# Patient Record
Sex: MALE | Race: WHITE | NOT HISPANIC OR LATINO | Employment: OTHER | URBAN - METROPOLITAN AREA
[De-identification: names, ages, dates, MRNs, and addresses within clinical notes are randomized per-mention and may not be internally consistent; named-entity substitution may affect disease eponyms.]

---

## 2021-06-14 ENCOUNTER — APPOINTMENT (OUTPATIENT)
Dept: RADIOLOGY | Facility: MEDICAL CENTER | Age: 56
DRG: 956 | End: 2021-06-14
Attending: EMERGENCY MEDICINE
Payer: COMMERCIAL

## 2021-06-14 ENCOUNTER — HOSPITAL ENCOUNTER (INPATIENT)
Facility: MEDICAL CENTER | Age: 56
LOS: 30 days | DRG: 956 | End: 2021-07-14
Attending: EMERGENCY MEDICINE | Admitting: SURGERY
Payer: COMMERCIAL

## 2021-06-14 ENCOUNTER — APPOINTMENT (OUTPATIENT)
Dept: RADIOLOGY | Facility: MEDICAL CENTER | Age: 56
DRG: 956 | End: 2021-06-14
Attending: SURGERY
Payer: COMMERCIAL

## 2021-06-14 DIAGNOSIS — I82.412 ACUTE DEEP VEIN THROMBOSIS (DVT) OF FEMORAL VEIN OF LEFT LOWER EXTREMITY (HCC): ICD-10-CM

## 2021-06-14 DIAGNOSIS — S52.551A OTHER CLOSED EXTRA-ARTICULAR FRACTURE OF DISTAL END OF RIGHT RADIUS, INITIAL ENCOUNTER: ICD-10-CM

## 2021-06-14 DIAGNOSIS — S52.501A CLOSED FRACTURE OF DISTAL END OF RIGHT RADIUS, UNSPECIFIED FRACTURE MORPHOLOGY, INITIAL ENCOUNTER: ICD-10-CM

## 2021-06-14 DIAGNOSIS — S32.810A MULTIPLE CLOSED FRACTURES OF PELVIS WITH STABLE DISRUPTION OF PELVIC RING, INITIAL ENCOUNTER (HCC): ICD-10-CM

## 2021-06-14 DIAGNOSIS — J96.90 RESPIRATORY FAILURE, UNSPECIFIED CHRONICITY, UNSPECIFIED WHETHER WITH HYPOXIA OR HYPERCAPNIA (HCC): ICD-10-CM

## 2021-06-14 DIAGNOSIS — S22.43XA CLOSED FRACTURE OF MULTIPLE RIBS OF BOTH SIDES, INITIAL ENCOUNTER: ICD-10-CM

## 2021-06-14 DIAGNOSIS — S27.0XXA TRAUMATIC PNEUMOTHORAX, INITIAL ENCOUNTER: ICD-10-CM

## 2021-06-14 DIAGNOSIS — S32.82XA MULTIPLE CLOSED FRACTURES OF PELVIS WITHOUT DISRUPTION OF PELVIC RING, INITIAL ENCOUNTER (HCC): ICD-10-CM

## 2021-06-14 DIAGNOSIS — J96.90 RESPIRATORY FAILURE FOLLOWING TRAUMA (HCC): ICD-10-CM

## 2021-06-14 DIAGNOSIS — S72.001A CLOSED FRACTURE OF NECK OF RIGHT FEMUR, INITIAL ENCOUNTER (HCC): ICD-10-CM

## 2021-06-14 DIAGNOSIS — S72.401A CLOSED FRACTURE OF DISTAL END OF RIGHT FEMUR, UNSPECIFIED FRACTURE MORPHOLOGY, INITIAL ENCOUNTER (HCC): ICD-10-CM

## 2021-06-14 DIAGNOSIS — S06.0X1A CONCUSSION WITH LOSS OF CONSCIOUSNESS OF 30 MINUTES OR LESS, INITIAL ENCOUNTER: ICD-10-CM

## 2021-06-14 DIAGNOSIS — S43.101A AC SEPARATION, RIGHT, INITIAL ENCOUNTER: ICD-10-CM

## 2021-06-14 DIAGNOSIS — S82.141A CLOSED FRACTURE OF RIGHT TIBIAL PLATEAU, INITIAL ENCOUNTER: ICD-10-CM

## 2021-06-14 DIAGNOSIS — S82.101A CLOSED FRACTURE OF PROXIMAL END OF RIGHT TIBIA, UNSPECIFIED FRACTURE MORPHOLOGY, INITIAL ENCOUNTER: ICD-10-CM

## 2021-06-14 PROBLEM — I70.208 OCCLUSION OF RIGHT BRACHIAL ARTERY (HCC): Status: ACTIVE | Noted: 2021-06-14

## 2021-06-14 PROBLEM — Z53.09 CONTRAINDICATION TO DEEP VEIN THROMBOSIS (DVT) PROPHYLAXIS: Status: ACTIVE | Noted: 2021-06-14

## 2021-06-14 PROBLEM — Z11.9 SCREENING EXAMINATION FOR INFECTIOUS DISEASE: Status: ACTIVE | Noted: 2021-06-14

## 2021-06-14 PROBLEM — S72.91XA CLOSED FRACTURE OF RIGHT FEMUR (HCC): Status: ACTIVE | Noted: 2021-06-14

## 2021-06-14 PROBLEM — S32.10XA SACRAL FRACTURE (HCC): Status: ACTIVE | Noted: 2021-06-14

## 2021-06-14 PROBLEM — S22.49XA CLOSED FRACTURE OF MULTIPLE RIBS: Status: ACTIVE | Noted: 2021-06-14

## 2021-06-14 PROBLEM — J93.9 PNEUMOTHORAX ON RIGHT: Status: ACTIVE | Noted: 2021-06-14

## 2021-06-14 PROBLEM — R93.5 ABNORMAL CT OF THE ABDOMEN: Status: ACTIVE | Noted: 2021-06-14

## 2021-06-14 PROBLEM — S01.111A: Status: ACTIVE | Noted: 2021-06-14

## 2021-06-14 PROBLEM — T14.90XA TRAUMA: Status: ACTIVE | Noted: 2021-06-14

## 2021-06-14 PROBLEM — S32.000A WEDGE FRACTURE OF LUMBAR VERTEBRA (HCC): Status: ACTIVE | Noted: 2021-06-14

## 2021-06-14 PROBLEM — I65.21 OCCLUSION OF RIGHT CAROTID ARTERY: Status: ACTIVE | Noted: 2021-06-14

## 2021-06-14 LAB
ABO GROUP BLD: NORMAL
ALBUMIN SERPL BCP-MCNC: 3.4 G/DL (ref 3.2–4.9)
ALBUMIN/GLOB SERPL: 0.9 G/DL
ALP SERPL-CCNC: 111 U/L (ref 30–99)
ALT SERPL-CCNC: 43 U/L (ref 2–50)
ANION GAP SERPL CALC-SCNC: 14 MMOL/L (ref 7–16)
APTT PPP: 116.1 SEC (ref 24.7–36)
AST SERPL-CCNC: 68 U/L (ref 12–45)
BASE EXCESS BLDA CALC-SCNC: -4 MMOL/L (ref -4–3)
BASE EXCESS BLDA CALC-SCNC: 0 MMOL/L (ref -4–3)
BILIRUB SERPL-MCNC: 1 MG/DL (ref 0.1–1.5)
BLD GP AB SCN SERPL QL: NORMAL
BODY TEMPERATURE: ABNORMAL CENTIGRADE
BODY TEMPERATURE: ABNORMAL DEGREES
BREATHS SETTING VENT: 18
BUN SERPL-MCNC: 16 MG/DL (ref 8–22)
CALCIUM SERPL-MCNC: 8.5 MG/DL (ref 8.5–10.5)
CFT BLD TEG: NORMAL MIN (ref 5–10)
CFT P HPASE BLD TEG: 3.9 MIN (ref 5–10)
CHLORIDE SERPL-SCNC: 101 MMOL/L (ref 96–112)
CLOT ANGLE BLD TEG: NORMAL DEGREES (ref 53–72)
CLOT ANGLE P HPASE BLD TEG: 71.4 DEGREES (ref 53–72)
CLOT INIT P HPASE BLD TEG: 1.3 MIN (ref 1–3)
CLOT LYSIS 30M P MA LENFR BLD TEG: 2.3 % (ref 0–8)
CLOT LYSIS 30M P MA LENFR BLD TEG: NORMAL % (ref 0–8)
CO2 BLDA-SCNC: 26 MMOL/L (ref 20–33)
CO2 SERPL-SCNC: 21 MMOL/L (ref 20–33)
CREAT SERPL-MCNC: 1.25 MG/DL (ref 0.5–1.4)
CT.EXTRINSIC BLD ROTEM: NORMAL MIN (ref 1–3)
DELSYS IDSYS: ABNORMAL
END TIDAL CARBON DIOXIDE IECO2: 33 MMHG
ERYTHROCYTE [DISTWIDTH] IN BLOOD BY AUTOMATED COUNT: 48.2 FL (ref 35.9–50)
ETHANOL BLD-MCNC: <10.1 MG/DL (ref 0–10)
GLOBULIN SER CALC-MCNC: 3.7 G/DL (ref 1.9–3.5)
GLUCOSE SERPL-MCNC: 120 MG/DL (ref 65–99)
HCO3 BLDA-SCNC: 21 MMOL/L (ref 17–25)
HCO3 BLDA-SCNC: 24.5 MMOL/L (ref 17–25)
HCT VFR BLD AUTO: 34.9 % (ref 42–52)
HGB BLD-MCNC: 11.2 G/DL (ref 14–18)
HOROWITZ INDEX BLDA+IHG-RTO: 184 MM[HG]
INR PPP: 1.46 (ref 0.87–1.13)
LACTATE BLD-SCNC: 3.9 MMOL/L (ref 0.5–2)
MCF BLD TEG: NORMAL MM (ref 50–70)
MCF P HPASE BLD TEG: 60.2 MM (ref 50–70)
MCH RBC QN AUTO: 29.4 PG (ref 27–33)
MCHC RBC AUTO-ENTMCNC: 32.1 G/DL (ref 33.7–35.3)
MCV RBC AUTO: 91.6 FL (ref 81.4–97.8)
MODE IMODE: ABNORMAL
O2/TOTAL GAS SETTING VFR VENT: 50 %
PA AA BLD-ACNC: 70.1 %
PA ADP BLD-ACNC: 100 %
PCO2 BLDA: 38 MMHG (ref 26–37)
PCO2 BLDA: 39.1 MMHG (ref 26–37)
PCO2 TEMP ADJ BLDA: 39.7 MMHG (ref 26–37)
PEEP END EXPIRATORY PRESSURE IPEEP: 8 CMH20
PH BLDA: 7.37 [PH] (ref 7.4–7.5)
PH BLDA: 7.41 [PH] (ref 7.4–7.5)
PH TEMP ADJ BLDA: 7.4 [PH] (ref 7.4–7.5)
PLATELET # BLD AUTO: 200 K/UL (ref 164–446)
PMV BLD AUTO: 11.2 FL (ref 9–12.9)
PO2 BLDA: 147.1 MMHG (ref 64–87)
PO2 BLDA: 92 MMHG (ref 64–87)
PO2 TEMP ADJ BLDA: 94 MMHG (ref 64–87)
POTASSIUM SERPL-SCNC: 3.3 MMOL/L (ref 3.6–5.5)
PROT SERPL-MCNC: 7.1 G/DL (ref 6–8.2)
PROTHROMBIN TIME: 17.3 SEC (ref 12–14.6)
RBC # BLD AUTO: 3.81 M/UL (ref 4.7–6.1)
RH BLD: NORMAL
SAO2 % BLDA: 97 % (ref 93–99)
SAO2 % BLDA: 98.6 % (ref 93–99)
SODIUM SERPL-SCNC: 136 MMOL/L (ref 135–145)
SPECIMEN DRAWN FROM PATIENT: ABNORMAL
TEG ALGORITHM TGALG: NORMAL
TIDAL VOLUME IVT: 450 ML
WBC # BLD AUTO: 25.8 K/UL (ref 4.8–10.8)

## 2021-06-14 PROCEDURE — 71045 X-RAY EXAM CHEST 1 VIEW: CPT

## 2021-06-14 PROCEDURE — 76705 ECHO EXAM OF ABDOMEN: CPT

## 2021-06-14 PROCEDURE — 02HV33Z INSERTION OF INFUSION DEVICE INTO SUPERIOR VENA CAVA, PERCUTANEOUS APPROACH: ICD-10-PCS | Performed by: SURGERY

## 2021-06-14 PROCEDURE — 94002 VENT MGMT INPAT INIT DAY: CPT

## 2021-06-14 PROCEDURE — 96376 TX/PRO/DX INJ SAME DRUG ADON: CPT

## 2021-06-14 PROCEDURE — 96374 THER/PROPH/DIAG INJ IV PUSH: CPT

## 2021-06-14 PROCEDURE — 72128 CT CHEST SPINE W/O DYE: CPT

## 2021-06-14 PROCEDURE — 70450 CT HEAD/BRAIN W/O DYE: CPT

## 2021-06-14 PROCEDURE — 70486 CT MAXILLOFACIAL W/O DYE: CPT

## 2021-06-14 PROCEDURE — 700117 HCHG RX CONTRAST REV CODE 255: Performed by: EMERGENCY MEDICINE

## 2021-06-14 PROCEDURE — 85610 PROTHROMBIN TIME: CPT

## 2021-06-14 PROCEDURE — U0003 INFECTIOUS AGENT DETECTION BY NUCLEIC ACID (DNA OR RNA); SEVERE ACUTE RESPIRATORY SYNDROME CORONAVIRUS 2 (SARS-COV-2) (CORONAVIRUS DISEASE [COVID-19]), AMPLIFIED PROBE TECHNIQUE, MAKING USE OF HIGH THROUGHPUT TECHNOLOGIES AS DESCRIBED BY CMS-2020-01-R: HCPCS

## 2021-06-14 PROCEDURE — 87426 SARSCOV CORONAVIRUS AG IA: CPT

## 2021-06-14 PROCEDURE — 31500 INSERT EMERGENCY AIRWAY: CPT

## 2021-06-14 PROCEDURE — 85576 BLOOD PLATELET AGGREGATION: CPT | Mod: 91

## 2021-06-14 PROCEDURE — 99291 CRITICAL CARE FIRST HOUR: CPT

## 2021-06-14 PROCEDURE — 73090 X-RAY EXAM OF FOREARM: CPT | Mod: RT

## 2021-06-14 PROCEDURE — 73700 CT LOWER EXTREMITY W/O DYE: CPT | Mod: LT

## 2021-06-14 PROCEDURE — 770022 HCHG ROOM/CARE - ICU (200)

## 2021-06-14 PROCEDURE — 85730 THROMBOPLASTIN TIME PARTIAL: CPT

## 2021-06-14 PROCEDURE — 303105 HCHG CATHETER EXTRA

## 2021-06-14 PROCEDURE — 90471 IMMUNIZATION ADMIN: CPT

## 2021-06-14 PROCEDURE — 85027 COMPLETE CBC AUTOMATED: CPT

## 2021-06-14 PROCEDURE — 51702 INSERT TEMP BLADDER CATH: CPT

## 2021-06-14 PROCEDURE — G0390 TRAUMA RESPONS W/HOSP CRITI: HCPCS

## 2021-06-14 PROCEDURE — 3E0234Z INTRODUCTION OF SERUM, TOXOID AND VACCINE INTO MUSCLE, PERCUTANEOUS APPROACH: ICD-10-PCS | Performed by: EMERGENCY MEDICINE

## 2021-06-14 PROCEDURE — 99254 IP/OBS CNSLTJ NEW/EST MOD 60: CPT | Mod: 57 | Performed by: ORTHOPAEDIC SURGERY

## 2021-06-14 PROCEDURE — C1751 CATH, INF, PER/CENT/MIDLINE: HCPCS

## 2021-06-14 PROCEDURE — 82077 ASSAY SPEC XCP UR&BREATH IA: CPT

## 2021-06-14 PROCEDURE — 25605 CLTX DST RDL FX/EPHYS SEP W/: CPT

## 2021-06-14 PROCEDURE — 302214 INTUBATION BOX: Performed by: SURGERY

## 2021-06-14 PROCEDURE — 86901 BLOOD TYPING SEROLOGIC RH(D): CPT

## 2021-06-14 PROCEDURE — 80053 COMPREHEN METABOLIC PANEL: CPT

## 2021-06-14 PROCEDURE — 700101 HCHG RX REV CODE 250: Performed by: EMERGENCY MEDICINE

## 2021-06-14 PROCEDURE — 306637 HCHG MISC ORTHO ITEM RC 0274

## 2021-06-14 PROCEDURE — 90715 TDAP VACCINE 7 YRS/> IM: CPT | Performed by: SURGERY

## 2021-06-14 PROCEDURE — 86900 BLOOD TYPING SEROLOGIC ABO: CPT

## 2021-06-14 PROCEDURE — 700105 HCHG RX REV CODE 258: Performed by: SURGERY

## 2021-06-14 PROCEDURE — 82803 BLOOD GASES ANY COMBINATION: CPT

## 2021-06-14 PROCEDURE — 83605 ASSAY OF LACTIC ACID: CPT

## 2021-06-14 PROCEDURE — 36556 INSERT NON-TUNNEL CV CATH: CPT

## 2021-06-14 PROCEDURE — 700111 HCHG RX REV CODE 636 W/ 250 OVERRIDE (IP): Performed by: EMERGENCY MEDICINE

## 2021-06-14 PROCEDURE — U0005 INFEC AGEN DETEC AMPLI PROBE: HCPCS

## 2021-06-14 PROCEDURE — 72131 CT LUMBAR SPINE W/O DYE: CPT

## 2021-06-14 PROCEDURE — 96375 TX/PRO/DX INJ NEW DRUG ADDON: CPT

## 2021-06-14 PROCEDURE — 85347 COAGULATION TIME ACTIVATED: CPT | Mod: 91

## 2021-06-14 PROCEDURE — 85384 FIBRINOGEN ACTIVITY: CPT

## 2021-06-14 PROCEDURE — 94799 UNLISTED PULMONARY SVC/PX: CPT

## 2021-06-14 PROCEDURE — 700111 HCHG RX REV CODE 636 W/ 250 OVERRIDE (IP): Performed by: SURGERY

## 2021-06-14 PROCEDURE — 73552 X-RAY EXAM OF FEMUR 2/>: CPT | Mod: RT

## 2021-06-14 PROCEDURE — 71260 CT THORAX DX C+: CPT

## 2021-06-14 PROCEDURE — 0BH18EZ INSERTION OF ENDOTRACHEAL AIRWAY INTO TRACHEA, VIA NATURAL OR ARTIFICIAL OPENING ENDOSCOPIC: ICD-10-PCS | Performed by: EMERGENCY MEDICINE

## 2021-06-14 PROCEDURE — 72170 X-RAY EXAM OF PELVIS: CPT

## 2021-06-14 PROCEDURE — 72125 CT NECK SPINE W/O DYE: CPT

## 2021-06-14 PROCEDURE — 86850 RBC ANTIBODY SCREEN: CPT

## 2021-06-14 PROCEDURE — 36600 WITHDRAWAL OF ARTERIAL BLOOD: CPT

## 2021-06-14 PROCEDURE — 27510 TREATMENT OF THIGH FRACTURE: CPT

## 2021-06-14 PROCEDURE — 5A1945Z RESPIRATORY VENTILATION, 24-96 CONSECUTIVE HOURS: ICD-10-PCS | Performed by: EMERGENCY MEDICINE

## 2021-06-14 RX ORDER — FAMOTIDINE 20 MG/1
20 TABLET, FILM COATED ORAL 2 TIMES DAILY
Status: DISCONTINUED | OUTPATIENT
Start: 2021-06-15 | End: 2021-06-19

## 2021-06-14 RX ORDER — BISACODYL 10 MG
10 SUPPOSITORY, RECTAL RECTAL
Status: DISCONTINUED | OUTPATIENT
Start: 2021-06-14 | End: 2021-07-14 | Stop reason: HOSPADM

## 2021-06-14 RX ORDER — AMOXICILLIN 250 MG
1 CAPSULE ORAL
Status: DISCONTINUED | OUTPATIENT
Start: 2021-06-14 | End: 2021-07-14 | Stop reason: HOSPADM

## 2021-06-14 RX ORDER — ENEMA 19; 7 G/133ML; G/133ML
1 ENEMA RECTAL
Status: DISCONTINUED | OUTPATIENT
Start: 2021-06-14 | End: 2021-07-14 | Stop reason: HOSPADM

## 2021-06-14 RX ORDER — KETAMINE HYDROCHLORIDE 50 MG/ML
INJECTION, SOLUTION INTRAMUSCULAR; INTRAVENOUS
Status: COMPLETED | OUTPATIENT
Start: 2021-06-14 | End: 2021-06-14

## 2021-06-14 RX ORDER — ROCURONIUM BROMIDE 10 MG/ML
INJECTION, SOLUTION INTRAVENOUS
Status: COMPLETED | OUTPATIENT
Start: 2021-06-14 | End: 2021-06-14

## 2021-06-14 RX ORDER — DOCUSATE SODIUM 100 MG/1
100 CAPSULE, LIQUID FILLED ORAL 2 TIMES DAILY
Status: DISCONTINUED | OUTPATIENT
Start: 2021-06-14 | End: 2021-07-13

## 2021-06-14 RX ORDER — ONDANSETRON 2 MG/ML
4 INJECTION INTRAMUSCULAR; INTRAVENOUS EVERY 4 HOURS PRN
Status: DISCONTINUED | OUTPATIENT
Start: 2021-06-14 | End: 2021-07-01

## 2021-06-14 RX ORDER — POLYETHYLENE GLYCOL 3350 17 G/17G
1 POWDER, FOR SOLUTION ORAL 2 TIMES DAILY
Status: DISCONTINUED | OUTPATIENT
Start: 2021-06-14 | End: 2021-07-14 | Stop reason: HOSPADM

## 2021-06-14 RX ORDER — SODIUM CHLORIDE, SODIUM LACTATE, POTASSIUM CHLORIDE, CALCIUM CHLORIDE 600; 310; 30; 20 MG/100ML; MG/100ML; MG/100ML; MG/100ML
INJECTION, SOLUTION INTRAVENOUS CONTINUOUS
Status: DISCONTINUED | OUTPATIENT
Start: 2021-06-14 | End: 2021-06-16

## 2021-06-14 RX ORDER — AMOXICILLIN 250 MG
1 CAPSULE ORAL NIGHTLY
Status: DISCONTINUED | OUTPATIENT
Start: 2021-06-14 | End: 2021-07-13

## 2021-06-14 RX ADMIN — SODIUM CHLORIDE, POTASSIUM CHLORIDE, SODIUM LACTATE AND CALCIUM CHLORIDE: 600; 310; 30; 20 INJECTION, SOLUTION INTRAVENOUS at 23:10

## 2021-06-14 RX ADMIN — CLOSTRIDIUM TETANI TOXOID ANTIGEN (FORMALDEHYDE INACTIVATED), CORYNEBACTERIUM DIPHTHERIAE TOXOID ANTIGEN (FORMALDEHYDE INACTIVATED), BORDETELLA PERTUSSIS TOXOID ANTIGEN (GLUTARALDEHYDE INACTIVATED), BORDETELLA PERTUSSIS FILAMENTOUS HEMAGGLUTININ ANTIGEN (FORMALDEHYDE INACTIVATED), BORDETELLA PERTUSSIS PERTACTIN ANTIGEN, AND BORDETELLA PERTUSSIS FIMBRIAE 2/3 ANTIGEN 0.5 ML: 5; 2; 2.5; 5; 3; 5 INJECTION, SUSPENSION INTRAMUSCULAR at 21:36

## 2021-06-14 RX ADMIN — KETAMINE HYDROCHLORIDE 100 MG: 50 INJECTION INTRAMUSCULAR; INTRAVENOUS at 21:09

## 2021-06-14 RX ADMIN — FENTANYL CITRATE 50 MCG: 50 INJECTION, SOLUTION INTRAMUSCULAR; INTRAVENOUS at 23:09

## 2021-06-14 RX ADMIN — FENTANYL CITRATE 50 MCG: 50 INJECTION, SOLUTION INTRAMUSCULAR; INTRAVENOUS at 21:10

## 2021-06-14 RX ADMIN — PROPOFOL 30 MCG/KG/MIN: 10 INJECTION, EMULSION INTRAVENOUS at 21:30

## 2021-06-14 RX ADMIN — ROCURONIUM BROMIDE 100 MG: 10 INJECTION, SOLUTION INTRAVENOUS at 21:09

## 2021-06-14 RX ADMIN — FENTANYL CITRATE 50 MCG: 50 INJECTION, SOLUTION INTRAMUSCULAR; INTRAVENOUS at 21:29

## 2021-06-14 RX ADMIN — PROPOFOL 30 MCG/KG/MIN: 10 INJECTION, EMULSION INTRAVENOUS at 23:10

## 2021-06-14 RX ADMIN — IOHEXOL 100 ML: 350 INJECTION, SOLUTION INTRAVENOUS at 21:49

## 2021-06-14 ASSESSMENT — FIBROSIS 4 INDEX: FIB4 SCORE: 2.8

## 2021-06-14 ASSESSMENT — PAIN DESCRIPTION - PAIN TYPE: TYPE: ACUTE PAIN

## 2021-06-15 ENCOUNTER — APPOINTMENT (OUTPATIENT)
Dept: RADIOLOGY | Facility: MEDICAL CENTER | Age: 56
DRG: 956 | End: 2021-06-15
Attending: ORTHOPAEDIC SURGERY
Payer: COMMERCIAL

## 2021-06-15 ENCOUNTER — ANESTHESIA (OUTPATIENT)
Dept: SURGERY | Facility: MEDICAL CENTER | Age: 56
DRG: 956 | End: 2021-06-15
Payer: COMMERCIAL

## 2021-06-15 ENCOUNTER — ANESTHESIA EVENT (OUTPATIENT)
Dept: SURGERY | Facility: MEDICAL CENTER | Age: 56
DRG: 956 | End: 2021-06-15
Payer: COMMERCIAL

## 2021-06-15 ENCOUNTER — APPOINTMENT (OUTPATIENT)
Dept: RADIOLOGY | Facility: MEDICAL CENTER | Age: 56
DRG: 956 | End: 2021-06-15
Attending: SURGERY
Payer: COMMERCIAL

## 2021-06-15 PROBLEM — Z98.890 STATUS POST CARDIAC SURGERY: Status: ACTIVE | Noted: 2021-06-15

## 2021-06-15 PROBLEM — I95.9 HYPOTENSION: Status: ACTIVE | Noted: 2021-06-15

## 2021-06-15 LAB
ABO + RH BLD: NORMAL
ALBUMIN SERPL BCP-MCNC: 2.9 G/DL (ref 3.2–4.9)
ALBUMIN/GLOB SERPL: 0.9 G/DL
ALP SERPL-CCNC: 91 U/L (ref 30–99)
ALT SERPL-CCNC: 40 U/L (ref 2–50)
ANION GAP SERPL CALC-SCNC: 8 MMOL/L (ref 7–16)
ANISOCYTOSIS BLD QL SMEAR: ABNORMAL
AST SERPL-CCNC: 72 U/L (ref 12–45)
BASOPHILS # BLD AUTO: 0 % (ref 0–1.8)
BASOPHILS # BLD: 0 K/UL (ref 0–0.12)
BILIRUB SERPL-MCNC: 0.8 MG/DL (ref 0.1–1.5)
BUN SERPL-MCNC: 17 MG/DL (ref 8–22)
CALCIUM SERPL-MCNC: 8.3 MG/DL (ref 8.5–10.5)
CHLORIDE SERPL-SCNC: 101 MMOL/L (ref 96–112)
CO2 SERPL-SCNC: 24 MMOL/L (ref 20–33)
CORTIS SERPL-MCNC: 36.8 UG/DL (ref 0–23)
CREAT SERPL-MCNC: 0.97 MG/DL (ref 0.5–1.4)
EOSINOPHIL # BLD AUTO: 0 K/UL (ref 0–0.51)
EOSINOPHIL NFR BLD: 0 % (ref 0–6.9)
ERYTHROCYTE [DISTWIDTH] IN BLOOD BY AUTOMATED COUNT: 48.2 FL (ref 35.9–50)
GLOBULIN SER CALC-MCNC: 3.3 G/DL (ref 1.9–3.5)
GLUCOSE BLD-MCNC: 113 MG/DL (ref 65–99)
GLUCOSE SERPL-MCNC: 118 MG/DL (ref 65–99)
HCT VFR BLD AUTO: 29.5 % (ref 42–52)
HGB BLD-MCNC: 9.8 G/DL (ref 14–18)
LYMPHOCYTES # BLD AUTO: 0.88 K/UL (ref 1–4.8)
LYMPHOCYTES NFR BLD: 5.7 % (ref 22–41)
MACROCYTES BLD QL SMEAR: ABNORMAL
MANUAL DIFF BLD: ABNORMAL
MCH RBC QN AUTO: 30.4 PG (ref 27–33)
MCHC RBC AUTO-ENTMCNC: 33.2 G/DL (ref 33.7–35.3)
MCV RBC AUTO: 91.6 FL (ref 81.4–97.8)
MONOCYTES # BLD AUTO: 0.37 K/UL (ref 0–0.85)
MONOCYTES NFR BLD AUTO: 2.4 % (ref 0–13.4)
MORPHOLOGY BLD-IMP: NORMAL
NEUTROPHILS # BLD AUTO: 14.15 K/UL (ref 1.82–7.42)
NEUTROPHILS NFR BLD: 79.7 % (ref 44–72)
NEUTS BAND NFR BLD MANUAL: 12.2 % (ref 0–10)
NRBC # BLD AUTO: 0 K/UL
NRBC BLD-RTO: 0 /100 WBC
PLATELET # BLD AUTO: 143 K/UL (ref 164–446)
PLATELET BLD QL SMEAR: NORMAL
PMV BLD AUTO: 10.6 FL (ref 9–12.9)
POTASSIUM SERPL-SCNC: 3.9 MMOL/L (ref 3.6–5.5)
PROT SERPL-MCNC: 6.2 G/DL (ref 6–8.2)
RBC # BLD AUTO: 3.22 M/UL (ref 4.7–6.1)
RBC BLD AUTO: PRESENT
SARS-COV+SARS-COV-2 AG RESP QL IA.RAPID: NOTDETECTED
SARS-COV-2 RNA RESP QL NAA+PROBE: NOTDETECTED
SODIUM SERPL-SCNC: 133 MMOL/L (ref 135–145)
SPECIMEN SOURCE: NORMAL
SPECIMEN SOURCE: NORMAL
WBC # BLD AUTO: 15.4 K/UL (ref 4.8–10.8)

## 2021-06-15 PROCEDURE — 500054 HCHG BANDAGE, ELASTIC 6: Performed by: ORTHOPAEDIC SURGERY

## 2021-06-15 PROCEDURE — 700105 HCHG RX REV CODE 258: Performed by: SURGERY

## 2021-06-15 PROCEDURE — 71045 X-RAY EXAM CHEST 1 VIEW: CPT

## 2021-06-15 PROCEDURE — 700111 HCHG RX REV CODE 636 W/ 250 OVERRIDE (IP): Performed by: SURGERY

## 2021-06-15 PROCEDURE — 700105 HCHG RX REV CODE 258: Performed by: ANESTHESIOLOGY

## 2021-06-15 PROCEDURE — 700102 HCHG RX REV CODE 250 W/ 637 OVERRIDE(OP): Performed by: SURGERY

## 2021-06-15 PROCEDURE — 700111 HCHG RX REV CODE 636 W/ 250 OVERRIDE (IP): Performed by: ANESTHESIOLOGY

## 2021-06-15 PROCEDURE — L0458 TLSO 2MOD SYMPHIS-XIPHO PRE: HCPCS

## 2021-06-15 PROCEDURE — 99291 CRITICAL CARE FIRST HOUR: CPT | Performed by: SURGERY

## 2021-06-15 PROCEDURE — 160009 HCHG ANES TIME/MIN: Performed by: ORTHOPAEDIC SURGERY

## 2021-06-15 PROCEDURE — 700101 HCHG RX REV CODE 250: Performed by: ANESTHESIOLOGY

## 2021-06-15 PROCEDURE — 94003 VENT MGMT INPAT SUBQ DAY: CPT

## 2021-06-15 PROCEDURE — 501838 HCHG SUTURE GENERAL: Performed by: ORTHOPAEDIC SURGERY

## 2021-06-15 PROCEDURE — 160029 HCHG SURGERY MINUTES - 1ST 30 MINS LEVEL 4: Performed by: ORTHOPAEDIC SURGERY

## 2021-06-15 PROCEDURE — 160002 HCHG RECOVERY MINUTES (STAT): Performed by: ORTHOPAEDIC SURGERY

## 2021-06-15 PROCEDURE — 27215 TREAT PELVIC FRACTURE(S): CPT | Mod: 80ROC,59 | Performed by: ORTHOPAEDIC SURGERY

## 2021-06-15 PROCEDURE — 73552 X-RAY EXAM OF FEMUR 2/>: CPT | Mod: RT

## 2021-06-15 PROCEDURE — A6454 SELF-ADHER BAND W>=3" <5"/YD: HCPCS | Performed by: ORTHOPAEDIC SURGERY

## 2021-06-15 PROCEDURE — 27506 TREATMENT OF THIGH FRACTURE: CPT | Mod: RT | Performed by: ORTHOPAEDIC SURGERY

## 2021-06-15 PROCEDURE — 27216 TREAT PELVIC RING FRACTURE: CPT | Mod: 59,LT | Performed by: ORTHOPAEDIC SURGERY

## 2021-06-15 PROCEDURE — C1713 ANCHOR/SCREW BN/BN,TIS/BN: HCPCS | Performed by: ORTHOPAEDIC SURGERY

## 2021-06-15 PROCEDURE — A9270 NON-COVERED ITEM OR SERVICE: HCPCS | Performed by: SURGERY

## 2021-06-15 PROCEDURE — 500891 HCHG PACK, ORTHO MAJOR: Performed by: ORTHOPAEDIC SURGERY

## 2021-06-15 PROCEDURE — 0QSB36Z REPOSITION RIGHT LOWER FEMUR WITH INTRAMEDULLARY INTERNAL FIXATION DEVICE, PERCUTANEOUS APPROACH: ICD-10-PCS | Performed by: ORTHOPAEDIC SURGERY

## 2021-06-15 PROCEDURE — 700101 HCHG RX REV CODE 250

## 2021-06-15 PROCEDURE — 27215 TREAT PELVIC FRACTURE(S): CPT | Mod: 59 | Performed by: ORTHOPAEDIC SURGERY

## 2021-06-15 PROCEDURE — 110371 HCHG SHELL REV 272: Performed by: ORTHOPAEDIC SURGERY

## 2021-06-15 PROCEDURE — 27216 TREAT PELVIC RING FRACTURE: CPT | Mod: 80ROC,59 | Performed by: ORTHOPAEDIC SURGERY

## 2021-06-15 PROCEDURE — 82533 TOTAL CORTISOL: CPT

## 2021-06-15 PROCEDURE — 82962 GLUCOSE BLOOD TEST: CPT

## 2021-06-15 PROCEDURE — 27506 TREATMENT OF THIGH FRACTURE: CPT | Mod: 80ROC,RT | Performed by: ORTHOPAEDIC SURGERY

## 2021-06-15 PROCEDURE — L0150 CERV SEMI-RIG ADJ MOLDED CHN: HCPCS

## 2021-06-15 PROCEDURE — 0QS234Z REPOSITION RIGHT PELVIC BONE WITH INTERNAL FIXATION DEVICE, PERCUTANEOUS APPROACH: ICD-10-PCS | Performed by: ORTHOPAEDIC SURGERY

## 2021-06-15 PROCEDURE — 700101 HCHG RX REV CODE 250: Performed by: ORTHOPAEDIC SURGERY

## 2021-06-15 PROCEDURE — 72202 X-RAY EXAM SI JOINTS 3/> VWS: CPT

## 2021-06-15 PROCEDURE — 160036 HCHG PACU - EA ADDL 30 MINS PHASE I: Performed by: ORTHOPAEDIC SURGERY

## 2021-06-15 PROCEDURE — 85007 BL SMEAR W/DIFF WBC COUNT: CPT

## 2021-06-15 PROCEDURE — 700105 HCHG RX REV CODE 258

## 2021-06-15 PROCEDURE — 94799 UNLISTED PULMONARY SVC/PX: CPT

## 2021-06-15 PROCEDURE — 85027 COMPLETE CBC AUTOMATED: CPT

## 2021-06-15 PROCEDURE — 0QS134Z REPOSITION SACRUM WITH INTERNAL FIXATION DEVICE, PERCUTANEOUS APPROACH: ICD-10-PCS | Performed by: ORTHOPAEDIC SURGERY

## 2021-06-15 PROCEDURE — 160041 HCHG SURGERY MINUTES - EA ADDL 1 MIN LEVEL 4: Performed by: ORTHOPAEDIC SURGERY

## 2021-06-15 PROCEDURE — 160035 HCHG PACU - 1ST 60 MINS PHASE I: Performed by: ORTHOPAEDIC SURGERY

## 2021-06-15 PROCEDURE — 80053 COMPREHEN METABOLIC PANEL: CPT

## 2021-06-15 PROCEDURE — 160048 HCHG OR STATISTICAL LEVEL 1-5: Performed by: ORTHOPAEDIC SURGERY

## 2021-06-15 PROCEDURE — 500367 HCHG DRAIN KIT, HEMOVAC: Performed by: ORTHOPAEDIC SURGERY

## 2021-06-15 PROCEDURE — 770022 HCHG ROOM/CARE - ICU (200)

## 2021-06-15 DEVICE — SCREW CROSS LOCK 5MM X 37.5MM (4TX5=20): Type: IMPLANTABLE DEVICE | Site: FEMUR | Status: FUNCTIONAL

## 2021-06-15 DEVICE — SCREW CROSS LOCK 5MM X 45MM (4TX5=20): Type: IMPLANTABLE DEVICE | Site: FEMUR | Status: FUNCTIONAL

## 2021-06-15 DEVICE — IMPLANTABLE DEVICE: Type: IMPLANTABLE DEVICE | Site: FEMUR | Status: FUNCTIONAL

## 2021-06-15 DEVICE — WASHER FOR 7.3MM CANNULATED SCREWS 13.0MM  (3TX18=54) (6EA/PK): Type: IMPLANTABLE DEVICE | Site: FEMUR | Status: FUNCTIONAL

## 2021-06-15 DEVICE — SCREW CROSS LOCK 5MM X 55MM (4TX5=20): Type: IMPLANTABLE DEVICE | Site: FEMUR | Status: FUNCTIONAL

## 2021-06-15 DEVICE — SCREW CANNULATED FULLY THREADED 7.3MM X 85MM (3TX3=9): Type: IMPLANTABLE DEVICE | Site: FEMUR | Status: FUNCTIONAL

## 2021-06-15 DEVICE — SCREW CROSS LOCK 5MM X 80MM (4TX5=20): Type: IMPLANTABLE DEVICE | Site: FEMUR | Status: FUNCTIONAL

## 2021-06-15 RX ORDER — ONDANSETRON 2 MG/ML
INJECTION INTRAMUSCULAR; INTRAVENOUS PRN
Status: DISCONTINUED | OUTPATIENT
Start: 2021-06-15 | End: 2021-06-15 | Stop reason: SURG

## 2021-06-15 RX ORDER — OXYCODONE HCL 5 MG/5 ML
5 SOLUTION, ORAL ORAL
Status: COMPLETED | OUTPATIENT
Start: 2021-06-15 | End: 2021-06-15

## 2021-06-15 RX ORDER — ROCURONIUM BROMIDE 10 MG/ML
INJECTION, SOLUTION INTRAVENOUS PRN
Status: DISCONTINUED | OUTPATIENT
Start: 2021-06-15 | End: 2021-06-15 | Stop reason: SURG

## 2021-06-15 RX ORDER — LORAZEPAM 2 MG/ML
0.5 INJECTION INTRAMUSCULAR
Status: DISCONTINUED | OUTPATIENT
Start: 2021-06-15 | End: 2021-06-15 | Stop reason: HOSPADM

## 2021-06-15 RX ORDER — HYDROMORPHONE HYDROCHLORIDE 1 MG/ML
0.1 INJECTION, SOLUTION INTRAMUSCULAR; INTRAVENOUS; SUBCUTANEOUS
Status: DISCONTINUED | OUTPATIENT
Start: 2021-06-15 | End: 2021-06-15 | Stop reason: HOSPADM

## 2021-06-15 RX ORDER — HYDROMORPHONE HYDROCHLORIDE 1 MG/ML
0.4 INJECTION, SOLUTION INTRAMUSCULAR; INTRAVENOUS; SUBCUTANEOUS
Status: DISCONTINUED | OUTPATIENT
Start: 2021-06-15 | End: 2021-06-15 | Stop reason: HOSPADM

## 2021-06-15 RX ORDER — DIPHENHYDRAMINE HYDROCHLORIDE 50 MG/ML
12.5 INJECTION INTRAMUSCULAR; INTRAVENOUS
Status: DISCONTINUED | OUTPATIENT
Start: 2021-06-15 | End: 2021-06-15 | Stop reason: HOSPADM

## 2021-06-15 RX ORDER — DEXAMETHASONE SODIUM PHOSPHATE 4 MG/ML
INJECTION, SOLUTION INTRA-ARTICULAR; INTRALESIONAL; INTRAMUSCULAR; INTRAVENOUS; SOFT TISSUE PRN
Status: DISCONTINUED | OUTPATIENT
Start: 2021-06-15 | End: 2021-06-15 | Stop reason: SURG

## 2021-06-15 RX ORDER — NOREPINEPHRINE BITARTRATE 0.03 MG/ML
0-30 INJECTION, SOLUTION INTRAVENOUS CONTINUOUS
Status: DISCONTINUED | OUTPATIENT
Start: 2021-06-15 | End: 2021-06-17

## 2021-06-15 RX ORDER — MAGNESIUM HYDROXIDE 1200 MG/15ML
LIQUID ORAL
Status: COMPLETED | OUTPATIENT
Start: 2021-06-15 | End: 2021-06-15

## 2021-06-15 RX ORDER — LABETALOL HYDROCHLORIDE 5 MG/ML
5 INJECTION, SOLUTION INTRAVENOUS
Status: DISCONTINUED | OUTPATIENT
Start: 2021-06-15 | End: 2021-06-15 | Stop reason: HOSPADM

## 2021-06-15 RX ORDER — ONDANSETRON 2 MG/ML
4 INJECTION INTRAMUSCULAR; INTRAVENOUS
Status: DISCONTINUED | OUTPATIENT
Start: 2021-06-15 | End: 2021-06-15 | Stop reason: HOSPADM

## 2021-06-15 RX ORDER — HALOPERIDOL 5 MG/ML
1 INJECTION INTRAMUSCULAR
Status: DISCONTINUED | OUTPATIENT
Start: 2021-06-15 | End: 2021-06-15 | Stop reason: HOSPADM

## 2021-06-15 RX ORDER — HYDROMORPHONE HYDROCHLORIDE 1 MG/ML
0.2 INJECTION, SOLUTION INTRAMUSCULAR; INTRAVENOUS; SUBCUTANEOUS
Status: DISCONTINUED | OUTPATIENT
Start: 2021-06-15 | End: 2021-06-15 | Stop reason: HOSPADM

## 2021-06-15 RX ORDER — CEFAZOLIN SODIUM 1 G/3ML
INJECTION, POWDER, FOR SOLUTION INTRAMUSCULAR; INTRAVENOUS PRN
Status: DISCONTINUED | OUTPATIENT
Start: 2021-06-15 | End: 2021-06-15 | Stop reason: SURG

## 2021-06-15 RX ORDER — OXYCODONE HCL 5 MG/5 ML
10 SOLUTION, ORAL ORAL
Status: COMPLETED | OUTPATIENT
Start: 2021-06-15 | End: 2021-06-15

## 2021-06-15 RX ORDER — KETOROLAC TROMETHAMINE 30 MG/ML
INJECTION, SOLUTION INTRAMUSCULAR; INTRAVENOUS PRN
Status: DISCONTINUED | OUTPATIENT
Start: 2021-06-15 | End: 2021-06-15 | Stop reason: SURG

## 2021-06-15 RX ORDER — DEXMEDETOMIDINE HYDROCHLORIDE 100 UG/ML
INJECTION, SOLUTION INTRAVENOUS PRN
Status: DISCONTINUED | OUTPATIENT
Start: 2021-06-15 | End: 2021-06-15 | Stop reason: SURG

## 2021-06-15 RX ORDER — NOREPINEPHRINE BITARTRATE 0.03 MG/ML
INJECTION, SOLUTION INTRAVENOUS
Status: COMPLETED
Start: 2021-06-15 | End: 2021-06-15

## 2021-06-15 RX ORDER — TRANEXAMIC ACID 100 MG/ML
INJECTION, SOLUTION INTRAVENOUS PRN
Status: DISCONTINUED | OUTPATIENT
Start: 2021-06-15 | End: 2021-06-15 | Stop reason: SURG

## 2021-06-15 RX ORDER — OXYCODONE HYDROCHLORIDE 10 MG/1
10 TABLET ORAL
Status: DISCONTINUED | OUTPATIENT
Start: 2021-06-15 | End: 2021-06-16

## 2021-06-15 RX ORDER — OXYCODONE HYDROCHLORIDE 5 MG/1
5 TABLET ORAL
Status: DISCONTINUED | OUTPATIENT
Start: 2021-06-15 | End: 2021-06-16

## 2021-06-15 RX ORDER — SODIUM CHLORIDE, SODIUM LACTATE, POTASSIUM CHLORIDE, CALCIUM CHLORIDE 600; 310; 30; 20 MG/100ML; MG/100ML; MG/100ML; MG/100ML
INJECTION, SOLUTION INTRAVENOUS
Status: DISCONTINUED | OUTPATIENT
Start: 2021-06-15 | End: 2021-06-15 | Stop reason: SURG

## 2021-06-15 RX ORDER — HYDRALAZINE HYDROCHLORIDE 20 MG/ML
5 INJECTION INTRAMUSCULAR; INTRAVENOUS
Status: DISCONTINUED | OUTPATIENT
Start: 2021-06-15 | End: 2021-06-15 | Stop reason: HOSPADM

## 2021-06-15 RX ADMIN — SODIUM CHLORIDE, POTASSIUM CHLORIDE, SODIUM LACTATE AND CALCIUM CHLORIDE: 600; 310; 30; 20 INJECTION, SOLUTION INTRAVENOUS at 19:34

## 2021-06-15 RX ADMIN — SUGAMMADEX 200 MG: 100 INJECTION, SOLUTION INTRAVENOUS at 10:55

## 2021-06-15 RX ADMIN — SODIUM CHLORIDE, POTASSIUM CHLORIDE, SODIUM LACTATE AND CALCIUM CHLORIDE: 600; 310; 30; 20 INJECTION, SOLUTION INTRAVENOUS at 08:49

## 2021-06-15 RX ADMIN — TRANEXAMIC ACID 1000 MG: 100 INJECTION, SOLUTION INTRAVENOUS at 09:00

## 2021-06-15 RX ADMIN — DEXMEDETOMIDINE 10 MCG: 200 INJECTION, SOLUTION INTRAVENOUS at 09:25

## 2021-06-15 RX ADMIN — NOREPINEPHRINE BITARTRATE 20 MCG/MIN: 0.03 INJECTION, SOLUTION INTRAVENOUS at 00:20

## 2021-06-15 RX ADMIN — KETOROLAC TROMETHAMINE 30 MG: 30 INJECTION, SOLUTION INTRAMUSCULAR at 10:55

## 2021-06-15 RX ADMIN — FENTANYL CITRATE 100 MCG: 50 INJECTION, SOLUTION INTRAMUSCULAR; INTRAVENOUS at 03:05

## 2021-06-15 RX ADMIN — FENTANYL CITRATE 50 MCG: 50 INJECTION, SOLUTION INTRAMUSCULAR; INTRAVENOUS at 22:00

## 2021-06-15 RX ADMIN — CEFAZOLIN 2 G: 330 INJECTION, POWDER, FOR SOLUTION INTRAMUSCULAR; INTRAVENOUS at 09:00

## 2021-06-15 RX ADMIN — DEXMEDETOMIDINE 10 MCG: 200 INJECTION, SOLUTION INTRAVENOUS at 09:57

## 2021-06-15 RX ADMIN — FENTANYL CITRATE 150 MCG: 50 INJECTION, SOLUTION INTRAMUSCULAR; INTRAVENOUS at 09:00

## 2021-06-15 RX ADMIN — DEXAMETHASONE SODIUM PHOSPHATE 8 MG: 4 INJECTION, SOLUTION INTRA-ARTICULAR; INTRALESIONAL; INTRAMUSCULAR; INTRAVENOUS; SOFT TISSUE at 09:00

## 2021-06-15 RX ADMIN — FENTANYL CITRATE 50 MCG: 50 INJECTION, SOLUTION INTRAMUSCULAR; INTRAVENOUS at 01:57

## 2021-06-15 RX ADMIN — NOREPINEPHRINE BITARTRATE 20 MCG/MIN: 1 INJECTION, SOLUTION, CONCENTRATE INTRAVENOUS at 00:20

## 2021-06-15 RX ADMIN — FENTANYL CITRATE 50 MCG: 50 INJECTION, SOLUTION INTRAMUSCULAR; INTRAVENOUS at 15:26

## 2021-06-15 RX ADMIN — ONDANSETRON 4 MG: 2 INJECTION INTRAMUSCULAR; INTRAVENOUS at 10:55

## 2021-06-15 RX ADMIN — DEXMEDETOMIDINE 10 MCG: 200 INJECTION, SOLUTION INTRAVENOUS at 09:05

## 2021-06-15 RX ADMIN — DEXMEDETOMIDINE 10 MCG: 200 INJECTION, SOLUTION INTRAVENOUS at 09:45

## 2021-06-15 RX ADMIN — SODIUM CHLORIDE, POTASSIUM CHLORIDE, SODIUM LACTATE AND CALCIUM CHLORIDE: 600; 310; 30; 20 INJECTION, SOLUTION INTRAVENOUS at 05:48

## 2021-06-15 RX ADMIN — OXYCODONE HYDROCHLORIDE 10 MG: 10 TABLET ORAL at 19:25

## 2021-06-15 RX ADMIN — OXYCODONE HYDROCHLORIDE 10 MG: 10 TABLET ORAL at 22:51

## 2021-06-15 RX ADMIN — ROCURONIUM BROMIDE 50 MG: 10 INJECTION, SOLUTION INTRAVENOUS at 09:00

## 2021-06-15 RX ADMIN — ROCURONIUM BROMIDE 50 MG: 10 INJECTION, SOLUTION INTRAVENOUS at 10:01

## 2021-06-15 RX ADMIN — OXYCODONE HYDROCHLORIDE 10 MG: 10 TABLET ORAL at 15:53

## 2021-06-15 RX ADMIN — FAMOTIDINE 20 MG: 10 INJECTION INTRAVENOUS at 05:05

## 2021-06-15 RX ADMIN — PROPOFOL 50 MCG/KG/MIN: 10 INJECTION, EMULSION INTRAVENOUS at 05:47

## 2021-06-15 ASSESSMENT — ENCOUNTER SYMPTOMS
RESPIRATORY NEGATIVE: 1
EYES NEGATIVE: 1
CONSTITUTIONAL NEGATIVE: 1
PSYCHIATRIC NEGATIVE: 1
CARDIOVASCULAR NEGATIVE: 1
BACK PAIN: 1
NEUROLOGICAL NEGATIVE: 1
GASTROINTESTINAL NEGATIVE: 1

## 2021-06-15 ASSESSMENT — PAIN SCALES - GENERAL: PAIN_LEVEL: 6

## 2021-06-15 ASSESSMENT — PATIENT HEALTH QUESTIONNAIRE - PHQ9
SUM OF ALL RESPONSES TO PHQ9 QUESTIONS 1 AND 2: 0
2. FEELING DOWN, DEPRESSED, IRRITABLE, OR HOPELESS: NOT AT ALL
1. LITTLE INTEREST OR PLEASURE IN DOING THINGS: NOT AT ALL

## 2021-06-15 ASSESSMENT — PAIN DESCRIPTION - PAIN TYPE
TYPE: ACUTE PAIN
TYPE: SURGICAL PAIN
TYPE: ACUTE PAIN

## 2021-06-15 NOTE — ANESTHESIA TIME REPORT
Anesthesia Start and Stop Event Times     Date Time Event    6/15/2021 0831 Ready for Procedure     0849 Anesthesia Start     1103 Anesthesia Stop        Responsible Staff  06/15/21    Name Role Begin End    Carli Walker M.D. Anesth 0849 1103        Preop Diagnosis (Free Text):  Pre-op Diagnosis     Right closed distal femur fracture, pelvic ring fracture        Preop Diagnosis (Codes):    Post op Diagnosis  Displaced apophyseal fracture of right femur, initial encounter for closed fracture      Premium Reason  Non-Premium    Comments:

## 2021-06-15 NOTE — ASSESSMENT & PLAN NOTE
Right eyelid laceration.  Non-operative management.  Luis Hernández MD. Plastic Surgeon. [facility]. (sign off 6/18).

## 2021-06-15 NOTE — ASSESSMENT & PLAN NOTE
Left inferior pubic ramus fracture. Anterior left acetabular column fracture. Right iliac wing fracture. Left sacral alar and body fracture. Minimally displaced fracture of the posterior rim of the right acetabulum.  6/15 Left percutaneous fluoroscopically guided iliosacral screw placement. Right anterior inferior iliac spine screw for iliac wing fracture.  6/29 Staple removal.  Weight bearing status - Nonweightbearing RLE. In knee immobilizer when OOB.  Jamil Sherman MD. Orthopedic Surgeon. [facility].

## 2021-06-15 NOTE — OR NURSING
Pt asleep.  Pt extubated in OR. Vitals stable. On monitors with alarms audible.No signs of distress.     Lucia (Wife) called and updated on plan of care.
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
## 2021-06-15 NOTE — PROGRESS NOTES
"TRAUMA TERTIARY SURVEY     Mental status adequate for full examination?: No- patient falling asleep during exam. Unable to fully participate.     Spine cleared (radiologically and/or clinically): Yes    PHYSICAL EXAMINATION:  Vitals: /86   Pulse 86   Temp 37 °C (98.6 °F) (Temporal)   Resp 20   Ht 1.753 m (5' 9\")   Wt 72.1 kg (158 lb 15.2 oz)   SpO2 98%   BMI 23.47 kg/m²   Constitutional:     General Appearance: appears stated age.  HEENT:    right upper eye laceration and swelling. .Nares clear bilateral. No signs of malocclusion.   Neck:    No posterior midline cervical-spine tenderness, no evidence of intoxication, normal level of alertness (Patrice Coma Scale 15), no focal neurologic deficit, and no painful distracting injuries.  Respiratory:   Inspection: Unlabored respirations, no intercostal retractions, paradoxical motion, or accessory muscle use.   Palpation:  The chest has previous scar to left clavicle and mid sternal scar. Shakes head \"yes\" to previous cardiac surgery The clavicles are non deformed bilaterally..   Auscultation: clear to auscultation.  Cardiovascular:   Auscultation: regular rate and rhythm.   Peripheral Pulses: Normal.   Abdomen:   Abdomen is soft, nontender, without organomegaly or masses.  Genitourinary:   (MALE): normal male external genitalia.  Musculoskeletal:   The pelvis is stable. Right arm in ace wrap, denies left arm pain, leg post op.  Back:   The thoracolumbar spine was examined. Examination is remarkable for tenderness in the lumbar region.  Skin:   The skin is warm and dry.  Neurologic:    Patirce Coma Scale (GCS) 12 [id number]. Neurologic examination revealed falling asleep and not answering question. Shaking head only..  Psychiatric:   The patient does not appear depressed or anxious.    IMAGING:  DX-PORTABLE FLUORO > 1 HOUR   Final Result      Portable fluoroscopy utilized for 2 minutes 36 seconds.         INTERPRETING LOCATION: [address] [place] [address], [id number]    "   DX-FEMUR-2+ RIGHT   Final Result      Intraoperative image as above described.      DX-SACROILIAC JOINTS 3+   Final Result      Digitized intraoperative radiograph is submitted for review.  This examination is not for diagnostic purpose but for guidance during a surgical procedure.      DX-CHEST-PORTABLE (1 VIEW)   Final Result         1.  No acute cardiopulmonary disease.      CT-KNEE W/O PLUS RECONS LEFT   Final Result         1.  Distal femoral diaphyseal fracture with offset and slight overlapping of bony fracture fragments.   2.  Hairline fibular head fracture.   3.  Proximal tibial metaphyseal impacted fracture extending into the lateral tibial plateau with 1.5 mm depression.   4.  Knee lipohemarthrosis      CT-CHEST,ABDOMEN,PELVIS WITH   Final Result         1.  Nonopacification of the visualized right common carotid artery, concerning for carotid arterial injury. Could be further evaluated with CT angiogram of the neck.   2.  Small apical right pneumothorax   3.  Low-density changes in the spleen, appears likely related to contrast phase, subtle splenic laceration cannot be definitively excluded.   4.  Left inferior pubic ramus fracture.   5.  Anterior left acetabular column fracture.   6.  Right iliac wing fracture.   7.  Left sacral alar and body fracture.   8.  Minimally displaced fracture of the posterior rim of the right acetabulum   9.  Left posterior eighth through 11th rib fractures   10.  Thoracic and lumbar spinal fractures, see dedicated CT of the spine for further characterization.   11.  Right distal radius fracture and ulnar styloid fracture.   12.  Dependent calcified bladder stones      These findings were discussed with the patient's clinician, Mirza Pyle, on 6/14/2021 10:49 PM.      CT-LSPINE W/O PLUS RECONS   Final Result         1.  Anterior wedge compression fractures at T12, L1, and L2.   2.  T11 spinous process tip fracture.   3.  Left L5 transverse process tip fracture    4.  Left sacral body fracture   5.  Right iliac bone fracture   6.  Atherosclerosis      CT-TSPINE W/O PLUS RECONS   Final Result         1.  T11 spinous process tip fracture.   2.  Mild anterior wedge deformity of T12 suggests subtle compression fracture.   3.  Vertebral body fracture of L1 and L2.      CT-HEAD W/O   Final Result         1.  No acute intracranial abnormality.      CT-MAXILLOFACIAL W/O PLUS RECONS   Final Result         1.  No acute traumatic facial bony injuries identified.      CT-CSPINE WITHOUT PLUS RECONS   Final Result         1.  Multilevel degenerative changes of the cervical spine limit diagnostic sensitivity of this examination, otherwise no acute traumatic bony injury of the cervical spine is apparent.   2.  Right pneumothorax      US-ABDOMEN F.A.S.T. LTD (FOR ED USE ONLY)   Final Result         1.  No free fluid seen in all 4 quadrants.  Negative FAST scan.      DX-FEMUR-2+ RIGHT   Final Result         1.  Distal right femoral diaphyseal fracture with overriding bony fracture fragments.   2.  Comminuted proximal tibial metaphyseal fracture extending into the joint space.   3.  Possible fracture of the posterior right acetabulum      DX-PELVIS-1 OR 2 VIEWS   Final Result         1.  Probable fracture of the posterior lateral acetabular wall.   2.  Age indeterminant left inferior pubic ramus fracture   3.  Left sacral body fracture      DX-FOREARM RIGHT   Final Result         1.  Distal radial fracture with apex dorsal angulation and volar displacement of distal radial fracture fragments.      DX-CHEST-LIMITED (1 VIEW)   Final Result         1.  No acute cardiopulmonary disease.      DX-CHEST-LIMITED (1 VIEW)   Final Result         1.  No acute cardiopulmonary disease.      DX-ABDOMEN FOR TUBE PLACEMENT   Final Result         1.  Large quantity of stool in the colon suggests changes of constipation, otherwise nonspecific bowel gas pattern   2.  Nasogastric tube tip terminates overlying  the expected location of the gastric body.        All current laboratory studies/radiology exams reviewed: Yes    Completed Consultations:  Ortho     Pending Consultations:  Facial     Newly Identified Diagnoses and Injuries:  ?Cardiac hx- get records from family- requested from [facility]     TOTAL RAP SCORE:  RAP Score Total: 12      ETOH Screening     Assessment complete date: 6/15/2021

## 2021-06-15 NOTE — ASSESSMENT & PLAN NOTE
Distal right femoral diaphyseal fracture with overriding bony fracture fragments.  Sarai splint placed in Emergency Department.  Immobilizer placed in ICU.  6/15 IM nailing.  6/29 Staple removal.  Weight bearing status - Nonweightbearing RLE. In knee immobilizer when OOB.  Jamil Odonnell MD. Orthopedic Surgeon. [facility].

## 2021-06-15 NOTE — PROGRESS NOTES
Minimal T12 - L2 compression fx  T11 SP fx  L5 TP fx  Right Sacral fx    I have ordered a TLSO.  He will need to wear TLSO when upright for 6 weeks  No spinal precautions necessary

## 2021-06-15 NOTE — CARE PLAN
Respiratory Trauma Red Note    Intubation yes    Positive Color Change on EZCap? yes  Difficult Airway no  Number of attempts one  Evidence of aspiration no  Airway ETT Oral 8.0-Secured At  (cm): 25 (06/14/21 2115)  Vent Mode: APVCMV (06/14/21 2115)     Rate (breaths/min): 18 (06/14/21 2115)  Vt Target (mL): 450 (06/14/21 2115)     PEEP/CPAP: 8 (06/14/21 2115)  PIP: 0.9 (06/14/21 2115)    Events/Summary/Plan: intubated for airway protection (06/14/21 2115)

## 2021-06-15 NOTE — PROGRESS NOTES
Orthopaedics  Chart x-rays, lab, consult all reviewed   Agree with Dr. Rodríguez  To OR today for fixation, additional exam and plan to follow  Odonnell
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
## 2021-06-15 NOTE — ASSESSMENT & PLAN NOTE
Prophylactic anticoagulation for thrombotic prevention initially contraindicated secondary to elevated bleeding risk.  6/16 Prophylactic dose enoxaparin initiated.    6/18 Stopped due to DVT. Heparin drip initiated.

## 2021-06-15 NOTE — ASSESSMENT & PLAN NOTE
Chronic condition treated with plavix, lasix and K.  6/17 Resumed maintenance medication.  6/18 Decrease Lasix dose due to hypotension.
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
## 2021-06-15 NOTE — CARE PLAN
The patient is Watcher - Medium risk of patient condition declining or worsening         Progress made toward(s) clinical / shift goals: Pt educated on plan of care.      Problem: Pain - Standard  Goal: Alleviation of pain or a reduction in pain to the patient’s comfort goal  Outcome: Progressing     Problem: Skin Integrity  Goal: Skin integrity is maintained or improved  Outcome: Progressing     Problem: Fall Risk  Goal: Patient will remain free from falls  Outcome: Progressing       Patient is not progressing towards the following goals:      Problem: Knowledge Deficit - Standard  Goal: Patient and family/care givers will demonstrate understanding of plan of care, disease process/condition, diagnostic tests and medications  Outcome: Not Progressing     Problem: Safety - Medical Restraint  Goal: Remains free of injury from restraints (Restraint for Interference with Medical Device)  Outcome: Not Progressing  Goal: Free from restraint(s) (Restraint for Interference with Medical Device)  Outcome: Not Progressing

## 2021-06-15 NOTE — ASSESSMENT & PLAN NOTE
Anterior wedge compression fractures at T12, L1, and L2. T11 spinous process tip fracture. Left L5 transverse process tip fracture.  Non-operative management.  Off-the-shelf TLSO bracing. when out of bed for 6 weeks.  Follow up in 6 weeks for upright films.  Saqib Figueroa MD. Neurosurgeon. [facility]. (sign off 6/15).

## 2021-06-15 NOTE — ANESTHESIA PREPROCEDURE EVALUATION
Relevant Problems   CARDIAC   (positive) Occlusion of right brachial artery (HCC)   (positive) Occlusion of right carotid artery       Physical Exam    Airway - unable to assess  Patient is intubated/trached     Cardiovascular - normal exam     Dental - normal exam           Pulmonary   Breath sounds clear to auscultation     Abdominal    Neurological - sedated/unconcious                 Anesthesia Plan    ASA 4   ASA physical status 4 criteria: respiratory failure    Plan - general       Airway plan will be ETT          Induction: inhalational      Pertinent diagnostic labs and testing reviewed    Informed Consent:    Anesthetic plan and risks discussed with healthcare power of .

## 2021-06-15 NOTE — ASSESSMENT & PLAN NOTE
6/15 Norepinephrine drip started shortly after admission to ICU.  Normotensive state returned and drip slowly weaned off.

## 2021-06-15 NOTE — PROGRESS NOTES
orthopaedics  Patient seen and examined, xrays,labs all reviewed  Consult and consent obtained  Spoke to family explained surgery   Answered all questions  Blas

## 2021-06-15 NOTE — ASSESSMENT & PLAN NOTE
History of.  Prior axillary to axillary bypass graft at [facility].  2013 Catheter thrombectomy and intraoperative retrograde angiogram by .  6/17 Resume Plavix.  6/20 Therapeutic Lovenox initiated. Plavix discontinued.  6/28 Transitioned to Xarelto.

## 2021-06-15 NOTE — PROGRESS NOTES
Dr. Marie notified of hypotension. Sedation stopped, pt following commands, opening eyes. Levophed gtt ordered.
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
## 2021-06-15 NOTE — ASSESSMENT & PLAN NOTE
2011 Arterial duplex confirms this.  History of carotid aneurysm repair.  Admit CT chest suggested occlusion which is unchanged.
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
## 2021-06-15 NOTE — PROGRESS NOTES
Patient arrived from ED. Patient on ventilator /73  O2 sat of 100%, Temp 98.8F    Skin assessment,    C-collar in placeRight eye laceration, Lt Forearm abrasions. Rt femur in rubi splint. Right forearm in splint, Bilateral vascular ulcers to the lower extremities. Left lower back and hip abrasions. Scattered abrasions to the back and sacral area. Right hip laceration. Laceration to the top of the scalp.

## 2021-06-15 NOTE — ASSESSMENT & PLAN NOTE
Distal radial fracture with apex dorsal angulation and volar displacement of distal radial fracture fragments.  Reduced and splinted in Emergency Department.  6/22 ORIF distal radius.  6/29 Staples removed.  Weight bearing status - Platform weightbearing EMANI Odonnell MD. Orthopedic Surgeon. [facility].

## 2021-06-15 NOTE — ANESTHESIA POSTPROCEDURE EVALUATION
Patient: Carrie Forty-[age]    Procedure Summary     Date: 06/15/21 Room / Location: [place] [id number] / SURGERY [facility]    Anesthesia Start: 0849 Anesthesia Stop: 1103    Procedures:       INSERTION, INTRAMEDULLARY TOMY, FEMUR RETROGRADE (Right Leg Upper)      INSERTION, SCREW, SACROILIAC JOINT (Bilateral Pelvis) Diagnosis: (Right closed distal femur fracture, pelvic ring fracture)    Surgeons: Jni Odonnell M.D. Responsible Provider: Carli Walker M.D.    Anesthesia Type: general ASA Status: 4          Final Anesthesia Type: general  Last vitals  BP   Blood Pressure: 121/79    Temp   37.2 °C (98.9 °F)    Pulse   100   Resp   (!) 24    SpO2   98 %      Anesthesia Post Evaluation    Patient location during evaluation: PACU  Patient participation: complete - patient participated  Level of consciousness: awake  Pain score: 6    Airway patency: patent  Anesthetic complications: no  Cardiovascular status: adequate  Respiratory status: acceptable  Hydration status: acceptable    PONV: none          No complications documented.

## 2021-06-15 NOTE — OR NURSING
Transported to floor via bed with RN.  On 2L O2 with tank >50% full and in spann.  O2 tubing connected to patient. RN notified that patient is on the way to the floor.    Family updated.

## 2021-06-15 NOTE — PROGRESS NOTES
Pt back from PACU. Pt drowsy, opens eyes to voice briefly but will not answer any orientation questions and falls back asleep. RR 20 bpm, 02 saturation 98%.

## 2021-06-15 NOTE — PROGRESS NOTES
Pt vented, anesthesia MD at bedside  Dr Odonnell spoke with wife Lucia via phone, 2 RN telephone consent obtained
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
## 2021-06-15 NOTE — CARE PLAN
Problem: Ventilation  Goal: Ability to achieve and maintain unassisted ventilation or tolerate decreased levels of ventilator support  Description: Document on Vent flowsheet    1.  Support and monitor invasive and noninvasive mechanical ventilation  2.  Monitor ventilator weaning response  3.  Perform ventilator associated pneumonia prevention interventions  4.  Manage ventilation therapy by monitoring diagnostic test results  Outcome: Progressing    Vent Day 2    8.0 25    APVCMV 18 450 +8 30%
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
## 2021-06-15 NOTE — OR SURGEON
Immediate Post OP Note    PreOp Diagnosis: closed distal 1/3 femur fracture right, left non displaced sacrum fracture, right iliac wing fracture      PostOp Diagnosis: same      Procedure(s):  INSERTION, INTRAMEDULLARY TOMY, RIGHT FEMUR RETROGRADE - Wound Class: Clean  INSERTION, SCREW, LEFT SACROILIAC JOINT - Wound Class: Clean  INSERTION RIGHT ILIAC WING SCREW..ANTERIOR INFERIOR ILIAC SCREW    Surgeon(s):  Jin Odonnell M.D. SURGEON  Derek Rodríguez D.O.    Anesthesiologist/Type of Anesthesia:  Anesthesiologist: Carli Walker M.D./General    Surgical Staff:  Circulator: Angelita Solis R.N.  Relief Circulator: Kanwal Roy R.N.  Relief Scrub: Eleazar Uribe  Scrub Person: Claudio Fernandez  Radiology Technologist: Casi Rosa    Specimens removed if any:  * No specimens in log *    Estimated Blood Loss: MINIMAL    Findings: AS DESCRIBED    Complications: NONE        6/15/2021 10:57 AM Jin Odonnell M.D.

## 2021-06-15 NOTE — ASSESSMENT & PLAN NOTE
Low-density changes in the spleen, appears likely related to contrast phase, subtle splenic laceration cannot be definitively excluded  Serial abdominal exams.

## 2021-06-15 NOTE — ASSESSMENT & PLAN NOTE
Left posterior eighth through 11th rib fractures.  Aggressive pulmonary hygiene and multimodal pain management.

## 2021-06-16 LAB
ALBUMIN SERPL BCP-MCNC: 2.5 G/DL (ref 3.2–4.9)
ALBUMIN/GLOB SERPL: 0.7 G/DL
ALP SERPL-CCNC: 75 U/L (ref 30–99)
ALT SERPL-CCNC: 39 U/L (ref 2–50)
ANION GAP SERPL CALC-SCNC: 9 MMOL/L (ref 7–16)
AST SERPL-CCNC: 65 U/L (ref 12–45)
BASOPHILS # BLD AUTO: 0 % (ref 0–1.8)
BASOPHILS # BLD: 0 K/UL (ref 0–0.12)
BILIRUB SERPL-MCNC: 0.6 MG/DL (ref 0.1–1.5)
BUN SERPL-MCNC: 20 MG/DL (ref 8–22)
CALCIUM SERPL-MCNC: 8.1 MG/DL (ref 8.5–10.5)
CHLORIDE SERPL-SCNC: 98 MMOL/L (ref 96–112)
CO2 SERPL-SCNC: 23 MMOL/L (ref 20–33)
CREAT SERPL-MCNC: 0.81 MG/DL (ref 0.5–1.4)
EOSINOPHIL # BLD AUTO: 0 K/UL (ref 0–0.51)
EOSINOPHIL NFR BLD: 0 % (ref 0–6.9)
ERYTHROCYTE [DISTWIDTH] IN BLOOD BY AUTOMATED COUNT: 47.7 FL (ref 35.9–50)
GIANT PLATELETS BLD QL SMEAR: NORMAL
GLOBULIN SER CALC-MCNC: 3.5 G/DL (ref 1.9–3.5)
GLUCOSE SERPL-MCNC: 99 MG/DL (ref 65–99)
HCT VFR BLD AUTO: 24.5 % (ref 42–52)
HGB BLD-MCNC: 8.1 G/DL (ref 14–18)
LYMPHOCYTES # BLD AUTO: 0.97 K/UL (ref 1–4.8)
LYMPHOCYTES NFR BLD: 7.8 % (ref 22–41)
MANUAL DIFF BLD: ABNORMAL
MCH RBC QN AUTO: 29.9 PG (ref 27–33)
MCHC RBC AUTO-ENTMCNC: 33.1 G/DL (ref 33.7–35.3)
MCV RBC AUTO: 90.4 FL (ref 81.4–97.8)
METAMYELOCYTES NFR BLD MANUAL: 0.9 %
MONOCYTES # BLD AUTO: 0.21 K/UL (ref 0–0.85)
MONOCYTES NFR BLD AUTO: 1.7 % (ref 0–13.4)
MORPHOLOGY BLD-IMP: NORMAL
NEUTROPHILS # BLD AUTO: 11.11 K/UL (ref 1.82–7.42)
NEUTROPHILS NFR BLD: 60.9 % (ref 44–72)
NEUTS BAND NFR BLD MANUAL: 28.7 % (ref 0–10)
NRBC # BLD AUTO: 0 K/UL
NRBC BLD-RTO: 0 /100 WBC
PLATELET # BLD AUTO: 117 K/UL (ref 164–446)
PLATELET BLD QL SMEAR: NORMAL
PMV BLD AUTO: 11 FL (ref 9–12.9)
POLYCHROMASIA BLD QL SMEAR: NORMAL
POTASSIUM SERPL-SCNC: 4.1 MMOL/L (ref 3.6–5.5)
PROT SERPL-MCNC: 6 G/DL (ref 6–8.2)
RBC # BLD AUTO: 2.71 M/UL (ref 4.7–6.1)
RBC BLD AUTO: PRESENT
SODIUM SERPL-SCNC: 130 MMOL/L (ref 135–145)
TOXIC GRANULES BLD QL SMEAR: SLIGHT
TRIGL SERPL-MCNC: 74 MG/DL (ref 0–149)
WBC # BLD AUTO: 12.4 K/UL (ref 4.8–10.8)

## 2021-06-16 PROCEDURE — 700111 HCHG RX REV CODE 636 W/ 250 OVERRIDE (IP): Performed by: SURGERY

## 2021-06-16 PROCEDURE — A9270 NON-COVERED ITEM OR SERVICE: HCPCS | Performed by: SURGERY

## 2021-06-16 PROCEDURE — 80053 COMPREHEN METABOLIC PANEL: CPT

## 2021-06-16 PROCEDURE — 700102 HCHG RX REV CODE 250 W/ 637 OVERRIDE(OP): Performed by: SURGERY

## 2021-06-16 PROCEDURE — 36600 WITHDRAWAL OF ARTERIAL BLOOD: CPT

## 2021-06-16 PROCEDURE — 85007 BL SMEAR W/DIFF WBC COUNT: CPT

## 2021-06-16 PROCEDURE — 770022 HCHG ROOM/CARE - ICU (200)

## 2021-06-16 PROCEDURE — 99233 SBSQ HOSP IP/OBS HIGH 50: CPT | Performed by: SURGERY

## 2021-06-16 PROCEDURE — 85027 COMPLETE CBC AUTOMATED: CPT

## 2021-06-16 PROCEDURE — 84478 ASSAY OF TRIGLYCERIDES: CPT

## 2021-06-16 PROCEDURE — 700105 HCHG RX REV CODE 258: Performed by: SURGERY

## 2021-06-16 RX ORDER — FUROSEMIDE 40 MG/1
40 TABLET ORAL DAILY
COMMUNITY
End: 2024-02-06

## 2021-06-16 RX ORDER — MORPHINE SULFATE 30 MG/1
30 TABLET, FILM COATED, EXTENDED RELEASE ORAL EVERY 12 HOURS
Status: DISCONTINUED | OUTPATIENT
Start: 2021-06-16 | End: 2021-06-29

## 2021-06-16 RX ORDER — DOCUSATE SODIUM 100 MG/1
100 CAPSULE, LIQUID FILLED ORAL DAILY
COMMUNITY
End: 2024-02-06

## 2021-06-16 RX ORDER — OXYCODONE HYDROCHLORIDE 10 MG/1
10 TABLET ORAL
Status: DISCONTINUED | OUTPATIENT
Start: 2021-06-16 | End: 2021-06-18

## 2021-06-16 RX ORDER — ASPIRIN 325 MG
325 TABLET ORAL DAILY
Status: ON HOLD | COMMUNITY
End: 2021-07-13

## 2021-06-16 RX ORDER — MORPHINE SULFATE 15 MG/1
30 TABLET, FILM COATED, EXTENDED RELEASE ORAL EVERY 12 HOURS
Status: DISCONTINUED | OUTPATIENT
Start: 2021-06-16 | End: 2021-06-16

## 2021-06-16 RX ORDER — CLOPIDOGREL BISULFATE 75 MG/1
75 TABLET ORAL DAILY
Status: ON HOLD | COMMUNITY
End: 2021-07-13

## 2021-06-16 RX ORDER — POTASSIUM CHLORIDE 20 MEQ/1
20 TABLET, EXTENDED RELEASE ORAL DAILY
COMMUNITY
End: 2024-02-06

## 2021-06-16 RX ORDER — ESZOPICLONE 3 MG/1
3 TABLET, FILM COATED ORAL
Status: ON HOLD | COMMUNITY
End: 2021-07-13

## 2021-06-16 RX ORDER — TAMSULOSIN HYDROCHLORIDE 0.4 MG/1
0.4 CAPSULE ORAL DAILY
COMMUNITY
End: 2024-02-06

## 2021-06-16 RX ORDER — FAMOTIDINE 20 MG/1
20 TABLET, FILM COATED ORAL DAILY
COMMUNITY
End: 2024-02-06

## 2021-06-16 RX ADMIN — OXYCODONE HYDROCHLORIDE 10 MG: 10 TABLET ORAL at 21:18

## 2021-06-16 RX ADMIN — SODIUM CHLORIDE, POTASSIUM CHLORIDE, SODIUM LACTATE AND CALCIUM CHLORIDE: 600; 310; 30; 20 INJECTION, SOLUTION INTRAVENOUS at 05:06

## 2021-06-16 RX ADMIN — OXYCODONE HYDROCHLORIDE 15 MG: 10 TABLET ORAL at 15:19

## 2021-06-16 RX ADMIN — OXYCODONE HYDROCHLORIDE 10 MG: 10 TABLET ORAL at 02:31

## 2021-06-16 RX ADMIN — FAMOTIDINE 20 MG: 20 TABLET ORAL at 17:55

## 2021-06-16 RX ADMIN — OXYCODONE HYDROCHLORIDE 10 MG: 10 TABLET ORAL at 09:08

## 2021-06-16 RX ADMIN — ENOXAPARIN SODIUM 30 MG: 30 INJECTION SUBCUTANEOUS at 17:56

## 2021-06-16 RX ADMIN — POLYETHYLENE GLYCOL 3350 1 PACKET: 17 POWDER, FOR SOLUTION ORAL at 05:42

## 2021-06-16 RX ADMIN — FENTANYL CITRATE 75 MCG: 50 INJECTION, SOLUTION INTRAMUSCULAR; INTRAVENOUS at 01:17

## 2021-06-16 RX ADMIN — DOCUSATE SODIUM 100 MG: 100 CAPSULE ORAL at 05:42

## 2021-06-16 RX ADMIN — FAMOTIDINE 20 MG: 20 TABLET ORAL at 05:42

## 2021-06-16 RX ADMIN — FENTANYL CITRATE 100 MCG: 50 INJECTION, SOLUTION INTRAMUSCULAR; INTRAVENOUS at 08:11

## 2021-06-16 RX ADMIN — MORPHINE SULFATE 30 MG: 15 TABLET, FILM COATED, EXTENDED RELEASE ORAL at 10:53

## 2021-06-16 RX ADMIN — OXYCODONE HYDROCHLORIDE 10 MG: 10 TABLET ORAL at 17:58

## 2021-06-16 RX ADMIN — FENTANYL CITRATE 100 MCG: 50 INJECTION, SOLUTION INTRAMUSCULAR; INTRAVENOUS at 04:10

## 2021-06-16 RX ADMIN — POLYETHYLENE GLYCOL 3350 1 PACKET: 17 POWDER, FOR SOLUTION ORAL at 17:56

## 2021-06-16 RX ADMIN — DOCUSATE SODIUM 100 MG: 100 CAPSULE ORAL at 17:55

## 2021-06-16 RX ADMIN — MORPHINE SULFATE 30 MG: 30 TABLET, FILM COATED, EXTENDED RELEASE ORAL at 22:43

## 2021-06-16 RX ADMIN — OXYCODONE HYDROCHLORIDE 10 MG: 10 TABLET ORAL at 05:41

## 2021-06-16 RX ADMIN — DOCUSATE SODIUM 50 MG AND SENNOSIDES 8.6 MG 1 TABLET: 8.6; 5 TABLET, FILM COATED ORAL at 21:18

## 2021-06-16 ASSESSMENT — FIBROSIS 4 INDEX: FIB4 SCORE: 4.3

## 2021-06-16 ASSESSMENT — ENCOUNTER SYMPTOMS
GASTROINTESTINAL NEGATIVE: 1
CONSTITUTIONAL NEGATIVE: 1
NEUROLOGICAL NEGATIVE: 1

## 2021-06-16 NOTE — PROGRESS NOTES
Skin Assessment     Left calf assessed with wound care RN, cleaned, photo taken and adhesive foam applied for optimal skin integrity.     Other areas assessed include:   Occipital region lac noted, ANABEL.   Right hip lac, gauze in place   Right forearm in splint, NANCY fully  Right leg in ace wrap and immobilizer, immobilizer removed and assessed under where can be visualized, no areas of concern, mepilex applied for skin protection.   Surgical incisions to pelvis, CDI   Abrasion to sacral region up to lower back.   Right eye laceration and edema noted, ANABEL.   LUE abrasions, redness noted under BP cuff from PTA.     Photos taken of areas of concern.
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
## 2021-06-16 NOTE — CARE PLAN
The patient is Stable - Low risk of patient condition declining or worsening    Shift Goals  Clinical Goals: Pain management & pschyosocial   Patient Goals: Pain control & rest, transfer out of ICU  Family Goals: n/a    Progress made toward(s) clinical / shift goals:  address patient pain control concerns with MD and pharmacy in clinical rounds, utilize TLSO when sitting upright to ensure that back is being supported and pain is better managed with mobility.       Patient is not progressing towards the following goals:    Plan to advance goal:  adjust pain medication standards to help with patient pain control effectively in order to adequately mobilize and progress with goals to transfer out of the ICU.     Problem: Pain - Standard  Goal: Alleviation of pain or a reduction in pain to the patient’s comfort goal  6/16/2021 0937 by Abbie Kim, R.N.  Outcome: Not Progressing  6/16/2021 0936 by Abbie Kim, R.N.  Outcome: Not Progressing     Problem: Psychosocial  Goal: Patient's ability to verbalize feelings about condition will improve  Outcome: Not Progressing  Goal: Patient's ability to re-evaluate and adapt role responsibilities will improve  Outcome: Not Progressing     Problem: Respiratory  Goal: Patient will achieve/maintain optimum respiratory ventilation and gas exchange  Outcome: Not Progressing

## 2021-06-16 NOTE — CARE PLAN
The patient is Stable - Low risk of patient condition declining or worsening    Shift Goals  Clinical Goals: Sleep, Improve IS, Communicate needs.  Patient Goals: Pain control  Family Goals: n/a    Progress made toward(s) clinical / shift goals:  Pt Slept some, not self motivated with IS and weak effort. Frequent reeducation on call bell use vs yelling to meet needs.       Problem: Skin Integrity  Goal: Skin integrity is maintained or improved  Outcome: Progressing     Problem: Fall Risk  Goal: Patient will remain free from falls  Outcome: Progressing       Patient is not progressing towards the following goals:      Problem: Pain - Standard  Goal: Alleviation of pain or a reduction in pain to the patient’s comfort goal  Outcome: Not Progressing     Problem: Knowledge Deficit - Standard  Goal: Patient and family/care givers will demonstrate understanding of plan of care, disease process/condition, diagnostic tests and medications  Outcome: Not Progressing

## 2021-06-16 NOTE — PROGRESS NOTES
"Orthopaedic Progress Note    Author: Juanjo Logan P.A.-C. Date & Time created: 6/16/2021   4:33 PM     Interval Events:  Patient doing well  Dressings CDI    Review of Systems   Constitutional: Negative.    Cardiovascular: Chest pain: rib fx    Gastrointestinal: Negative.    Musculoskeletal:        Pain well controlled    Neurological: Negative.      Hemodynamics:  /82   Pulse 89   Temp 37 °C (98.6 °F) (Temporal)   Resp 17   Ht 1.753 m (5' 9\")   Wt 73.3 kg (161 lb 9.6 oz)   SpO2 92%      No Active Precaution Orders    Respiratory:    Respiration: 17, Pulse Oximetry: 92 %     Work Of Breathing / Effort: Vented  RUL Breath Sounds: Clear, RML Breath Sounds: Clear, RLL Breath Sounds: Clear, ROD Breath Sounds: Clear, LLL Breath Sounds: Clear    Physical Exam   HENT:   Head: Normocephalic and atraumatic.   Pulmonary/Chest: He exhibits tenderness (rib fx ).   Musculoskeletal:      Comments: RUE splint CDI, DNVI, moves all fingers, cap refill <2 sec. RLE and pelvic dressing CDI, DNVI, cap refill <2 sec.      Labs:  Recent Labs     06/14/21  2128 06/15/21  0515 06/16/21  0555   WBC 25.8* 15.4* 12.4*   RBC 3.81* 3.22* 2.71*   HEMOGLOBIN 11.2* 9.8* 8.1*   HEMATOCRIT 34.9* 29.5* 24.5*   MCV 91.6 91.6 90.4   MCH 29.4 30.4 29.9   MCHC 32.1* 33.2* 33.1*   RDW 48.2 48.2 47.7   PLATELETCT 200 143* 117*   MPV 11.2 10.6 11.0     Recent Labs     06/14/21  2129 06/15/21  0515 06/16/21  0555   SODIUM 136 133* 130*   POTASSIUM 3.3* 3.9 4.1   CHLORIDE 101 101 98   CO2 21 24 23   GLUCOSE 120* 118* 99   BUN 16 17 20   CREATININE 1.25 0.97 0.81   CALCIUM 8.5 8.3* 8.1*       Medical Decision Making/Problem List:    Active Hospital Problems    Diagnosis    • Hypotension [I95.9]    • Status post cardiac surgery [Z98.890]    • Respiratory failure following trauma (HCC) [J96.90]    • Closed fracture of right femur ([facility]) [S72.91XA]    • Closed fracture of distal end of right radius [S52.501A]    • Screening examination for " infectious disease [Z11.9]    • Contraindication to deep vein thrombosis (DVT) prophylaxis [Z53.09]    • Eyelid laceration, right, initial encounter [S01.111A]    • Wedge fracture of lumbar vertebra (HCC) [S32.000A]    • 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  Clearance for lovenox/heparin: per trauma   Weight Bearing Status: NWB RUE and RLE  Wounds & Drains: dressings changed every other day by nursing  Disposition and Follow-up: per therapy recs

## 2021-06-16 NOTE — WOUND TEAM
Wound Team Note:  Reviewed assessment made by RN.  Confirmed findings of partial thickness wound  1.5 x 1.5 x 0.1 cm to LLE medial. Cleaned with NS, dried and adhesive foam drsg applied. Wound team to follow pt PRN if wound worsens. Nsg to notify.

## 2021-06-16 NOTE — CARE PLAN
The patient is Watcher - Medium risk of patient condition declining or worsening         Progress made toward(s) clinical / shift goals:  pain control    Patient is not progressing towards the following goals:      Problem: Pain - Standard  Goal: Alleviation of pain or a reduction in pain to the patient’s comfort goal  Outcome: Progressing     Problem: Knowledge Deficit - Standard  Goal: Patient and family/care givers will demonstrate understanding of plan of care, disease process/condition, diagnostic tests and medications  Outcome: Progressing     Problem: Skin Integrity  Goal: Skin integrity is maintained or improved  Outcome: Progressing     Problem: Fall Risk  Goal: Patient will remain free from falls  Outcome: Progressing     Problem: Safety - Medical Restraint  Goal: Remains free of injury from restraints (Restraint for Interference with Medical Device)  Outcome: Met  Goal: Free from restraint(s) (Restraint for Interference with Medical Device)  Outcome: Met

## 2021-06-16 NOTE — PROGRESS NOTES
Skin note    Occipital scalp laceration dry open to air  R Eye laceration, periorbital edema, dry and open to air  R forearm splinted.   Feet and ankles calloused, wounds appearing to be vascular ulcers b/l  R hip laceration dry gauze  B/l surgical incisions to pelvis covered with dry guaze  R leg ace wrapped and in immobilizer NANCY skin under surgical dressing   Abrasion to sacrum and up back

## 2021-06-16 NOTE — DISCHARGE PLANNING
Care Transition Team Assessment    In the case of an emergency, pt's legal NOK is spouse, Lucia Gorman [phone]      LSW met with pt at bedside and obtained the following information used in this assessment. Verified accuracy of facesheet.      Patient is  and reports he lives with spouse in a single level house in [place], [place]. Patient shared that his dc support is his wife. Wife is aware of admission but has not been to bedside per LSW's knowledge. No ACP. Booklet declined. Education provided. Prior to current hospitalization, pt was completely independent in ADLS/IADLS. No home O2. Patient has hx at [facility]. Treatment team to request records. No PCP. Denies SA or MH dx.     Barriers to dc: [employer] insurance     Plan: Continue to assist with social/dc needs     Care Transition Team Assessment    Information Source  Orientation Level: Oriented X4  Information Given By: Patient  Who is responsible for making decisions for patient? : Patient    Readmission Evaluation  Is this a readmission?: No    Elopement Risk  Legal Hold: No  Ambulatory or Self Mobile in Wheelchair: No-Not an Elopement Risk  Elopement Risk: Not at Risk for Elopement    Interdisciplinary Discharge Planning  Patient or legal guardian wants to designate a caregiver: No    Discharge Preparedness  What is your plan after discharge?: Uncertain - pending medical team collaboration  What are your discharge supports?: Spouse  Prior Functional Level: Ambulatory, Independent with Activities of Daily Living, Independent with Medication Management    Functional Assesment  Prior Functional Level: Ambulatory, Independent with Activities of Daily Living, Independent with Medication Management    Finances  Financial Barriers to Discharge: No  Prescription Coverage: Yes    Advance Directive  Advance Directive?: None  Advance Directive offered?: AD Booklet refused    Psychological Assessment  History of Substance Abuse: None  History of  Psychiatric Problems: No  Non-compliant with Treatment: No  Newly Diagnosed Illness: Yes    Discharge Risks or Barriers  Discharge risks or barriers?: Uninsured / underinsured ([employer])    Anticipated Discharge Information  Discharge Disposition: Still a Patient

## 2021-06-17 ENCOUNTER — APPOINTMENT (OUTPATIENT)
Dept: RADIOLOGY | Facility: MEDICAL CENTER | Age: 56
DRG: 956 | End: 2021-06-17
Attending: SURGERY
Payer: COMMERCIAL

## 2021-06-17 PROBLEM — N40.1 BPH WITH URINARY OBSTRUCTION: Status: ACTIVE | Noted: 2021-06-17

## 2021-06-17 PROBLEM — Z78.9 NO CONTRAINDICATION TO DEEP VEIN THROMBOSIS (DVT) PROPHYLAXIS: Status: ACTIVE | Noted: 2021-06-14

## 2021-06-17 PROBLEM — K21.9 GERD (GASTROESOPHAGEAL REFLUX DISEASE): Status: ACTIVE | Noted: 2021-06-17

## 2021-06-17 PROBLEM — M25.572 ACUTE LEFT ANKLE PAIN: Status: ACTIVE | Noted: 2021-06-17

## 2021-06-17 PROBLEM — E87.1 HYPONATREMIA: Status: ACTIVE | Noted: 2021-06-17

## 2021-06-17 PROBLEM — N13.8 BPH WITH URINARY OBSTRUCTION: Status: ACTIVE | Noted: 2021-06-17

## 2021-06-17 LAB
ANION GAP SERPL CALC-SCNC: 6 MMOL/L (ref 7–16)
BASOPHILS # BLD AUTO: 0.4 % (ref 0–1.8)
BASOPHILS # BLD: 0.05 K/UL (ref 0–0.12)
BUN SERPL-MCNC: 14 MG/DL (ref 8–22)
CALCIUM SERPL-MCNC: 8.1 MG/DL (ref 8.5–10.5)
CFT BLD TEG: 4.4 MIN (ref 5–10)
CHLORIDE SERPL-SCNC: 101 MMOL/L (ref 96–112)
CLOT ANGLE BLD TEG: 72.5 DEGREES (ref 53–72)
CLOT LYSIS 30M P MA LENFR BLD TEG: 0.1 % (ref 0–8)
CO2 SERPL-SCNC: 25 MMOL/L (ref 20–33)
CREAT SERPL-MCNC: 0.72 MG/DL (ref 0.5–1.4)
CT.EXTRINSIC BLD ROTEM: 1.2 MIN (ref 1–3)
EOSINOPHIL # BLD AUTO: 0.04 K/UL (ref 0–0.51)
EOSINOPHIL NFR BLD: 0.3 % (ref 0–6.9)
ERYTHROCYTE [DISTWIDTH] IN BLOOD BY AUTOMATED COUNT: 44.7 FL (ref 35.9–50)
GLUCOSE SERPL-MCNC: 119 MG/DL (ref 65–99)
HCT VFR BLD AUTO: 24.5 % (ref 42–52)
HGB BLD-MCNC: 8.3 G/DL (ref 14–18)
IMM GRANULOCYTES # BLD AUTO: 0.12 K/UL (ref 0–0.11)
IMM GRANULOCYTES NFR BLD AUTO: 1 % (ref 0–0.9)
LYMPHOCYTES # BLD AUTO: 0.8 K/UL (ref 1–4.8)
LYMPHOCYTES NFR BLD: 6.6 % (ref 22–41)
MCF BLD TEG: 67.2 MM (ref 50–70)
MCH RBC QN AUTO: 30 PG (ref 27–33)
MCHC RBC AUTO-ENTMCNC: 33.9 G/DL (ref 33.7–35.3)
MCV RBC AUTO: 88.4 FL (ref 81.4–97.8)
MONOCYTES # BLD AUTO: 0.73 K/UL (ref 0–0.85)
MONOCYTES NFR BLD AUTO: 6 % (ref 0–13.4)
NEUTROPHILS # BLD AUTO: 10.43 K/UL (ref 1.82–7.42)
NEUTROPHILS NFR BLD: 85.7 % (ref 44–72)
NRBC # BLD AUTO: 0 K/UL
NRBC BLD-RTO: 0 /100 WBC
PA AA BLD-ACNC: 10.2 %
PA ADP BLD-ACNC: 89.5 %
PLATELET # BLD AUTO: 110 K/UL (ref 164–446)
PMV BLD AUTO: 10.3 FL (ref 9–12.9)
POTASSIUM SERPL-SCNC: 4 MMOL/L (ref 3.6–5.5)
RBC # BLD AUTO: 2.77 M/UL (ref 4.7–6.1)
SODIUM SERPL-SCNC: 132 MMOL/L (ref 135–145)
TEG ALGORITHM TGALG: ABNORMAL
WBC # BLD AUTO: 12.2 K/UL (ref 4.8–10.8)

## 2021-06-17 PROCEDURE — 302126 SENSORMAT,CHAIR: Performed by: SURGERY

## 2021-06-17 PROCEDURE — 97163 PT EVAL HIGH COMPLEX 45 MIN: CPT

## 2021-06-17 PROCEDURE — 97165 OT EVAL LOW COMPLEX 30 MIN: CPT

## 2021-06-17 PROCEDURE — 99232 SBSQ HOSP IP/OBS MODERATE 35: CPT | Performed by: SURGERY

## 2021-06-17 PROCEDURE — 700102 HCHG RX REV CODE 250 W/ 637 OVERRIDE(OP): Performed by: SURGERY

## 2021-06-17 PROCEDURE — 80048 BASIC METABOLIC PNL TOTAL CA: CPT

## 2021-06-17 PROCEDURE — A9270 NON-COVERED ITEM OR SERVICE: HCPCS | Performed by: SURGERY

## 2021-06-17 PROCEDURE — 85025 COMPLETE CBC W/AUTO DIFF WBC: CPT

## 2021-06-17 PROCEDURE — 700101 HCHG RX REV CODE 250: Performed by: NURSE PRACTITIONER

## 2021-06-17 PROCEDURE — 700102 HCHG RX REV CODE 250 W/ 637 OVERRIDE(OP): Performed by: NURSE PRACTITIONER

## 2021-06-17 PROCEDURE — 94669 MECHANICAL CHEST WALL OSCILL: CPT

## 2021-06-17 PROCEDURE — A9270 NON-COVERED ITEM OR SERVICE: HCPCS | Performed by: NURSE PRACTITIONER

## 2021-06-17 PROCEDURE — 73600 X-RAY EXAM OF ANKLE: CPT | Mod: LT

## 2021-06-17 PROCEDURE — 85347 COAGULATION TIME ACTIVATED: CPT

## 2021-06-17 PROCEDURE — 700111 HCHG RX REV CODE 636 W/ 250 OVERRIDE (IP): Performed by: SURGERY

## 2021-06-17 PROCEDURE — 85576 BLOOD PLATELET AGGREGATION: CPT

## 2021-06-17 PROCEDURE — 85384 FIBRINOGEN ACTIVITY: CPT

## 2021-06-17 PROCEDURE — 770006 HCHG ROOM/CARE - MED/SURG/GYN SEMI*

## 2021-06-17 RX ORDER — LIDOCAINE 50 MG/G
1-3 PATCH TOPICAL EVERY 24 HOURS
Status: DISCONTINUED | OUTPATIENT
Start: 2021-06-17 | End: 2021-07-13

## 2021-06-17 RX ORDER — POTASSIUM CHLORIDE 20 MEQ/1
20 TABLET, EXTENDED RELEASE ORAL DAILY
Status: DISCONTINUED | OUTPATIENT
Start: 2021-06-17 | End: 2021-06-19

## 2021-06-17 RX ORDER — GABAPENTIN 300 MG/1
300 CAPSULE ORAL 3 TIMES DAILY
Status: DISCONTINUED | OUTPATIENT
Start: 2021-06-17 | End: 2021-07-02

## 2021-06-17 RX ORDER — FUROSEMIDE 40 MG/1
40 TABLET ORAL DAILY
Status: DISCONTINUED | OUTPATIENT
Start: 2021-06-17 | End: 2021-06-18

## 2021-06-17 RX ORDER — TAMSULOSIN HYDROCHLORIDE 0.4 MG/1
0.4 CAPSULE ORAL DAILY
Status: DISCONTINUED | OUTPATIENT
Start: 2021-06-17 | End: 2021-06-24

## 2021-06-17 RX ORDER — CLOPIDOGREL BISULFATE 75 MG/1
75 TABLET ORAL DAILY
Status: DISCONTINUED | OUTPATIENT
Start: 2021-06-17 | End: 2021-06-18

## 2021-06-17 RX ORDER — METAXALONE 800 MG/1
800 TABLET ORAL 3 TIMES DAILY
Status: DISCONTINUED | OUTPATIENT
Start: 2021-06-17 | End: 2021-06-28

## 2021-06-17 RX ADMIN — DOCUSATE SODIUM 50 MG AND SENNOSIDES 8.6 MG 1 TABLET: 8.6; 5 TABLET, FILM COATED ORAL at 21:31

## 2021-06-17 RX ADMIN — OXYCODONE HYDROCHLORIDE 15 MG: 10 TABLET ORAL at 21:31

## 2021-06-17 RX ADMIN — CLOPIDOGREL BISULFATE 75 MG: 75 TABLET ORAL at 12:57

## 2021-06-17 RX ADMIN — MAGNESIUM HYDROXIDE 30 ML: 400 SUSPENSION ORAL at 04:51

## 2021-06-17 RX ADMIN — METAXALONE 800 MG: 800 TABLET ORAL at 17:08

## 2021-06-17 RX ADMIN — MORPHINE SULFATE 30 MG: 30 TABLET, FILM COATED, EXTENDED RELEASE ORAL at 11:48

## 2021-06-17 RX ADMIN — MORPHINE SULFATE 30 MG: 30 TABLET, FILM COATED, EXTENDED RELEASE ORAL at 23:00

## 2021-06-17 RX ADMIN — GABAPENTIN 300 MG: 300 CAPSULE ORAL at 11:48

## 2021-06-17 RX ADMIN — POTASSIUM CHLORIDE 20 MEQ: 1500 TABLET, EXTENDED RELEASE ORAL at 11:48

## 2021-06-17 RX ADMIN — DOCUSATE SODIUM 100 MG: 100 CAPSULE ORAL at 17:08

## 2021-06-17 RX ADMIN — METAXALONE 800 MG: 800 TABLET ORAL at 11:48

## 2021-06-17 RX ADMIN — ENOXAPARIN SODIUM 30 MG: 30 INJECTION SUBCUTANEOUS at 04:52

## 2021-06-17 RX ADMIN — LIDOCAINE 1 PATCH: 50 PATCH TOPICAL at 11:48

## 2021-06-17 RX ADMIN — GABAPENTIN 300 MG: 300 CAPSULE ORAL at 17:08

## 2021-06-17 RX ADMIN — FENTANYL CITRATE 100 MCG: 50 INJECTION, SOLUTION INTRAMUSCULAR; INTRAVENOUS at 06:06

## 2021-06-17 RX ADMIN — FUROSEMIDE 40 MG: 40 TABLET ORAL at 11:52

## 2021-06-17 RX ADMIN — POLYETHYLENE GLYCOL 3350 1 PACKET: 17 POWDER, FOR SOLUTION ORAL at 04:52

## 2021-06-17 RX ADMIN — TAMSULOSIN HYDROCHLORIDE 0.4 MG: 0.4 CAPSULE ORAL at 11:48

## 2021-06-17 RX ADMIN — ENOXAPARIN SODIUM 30 MG: 30 INJECTION SUBCUTANEOUS at 17:09

## 2021-06-17 RX ADMIN — OXYCODONE HYDROCHLORIDE 15 MG: 10 TABLET ORAL at 04:51

## 2021-06-17 RX ADMIN — DOCUSATE SODIUM 100 MG: 100 CAPSULE ORAL at 04:52

## 2021-06-17 RX ADMIN — POLYETHYLENE GLYCOL 3350 1 PACKET: 17 POWDER, FOR SOLUTION ORAL at 17:08

## 2021-06-17 RX ADMIN — OXYCODONE HYDROCHLORIDE 15 MG: 10 TABLET ORAL at 09:04

## 2021-06-17 RX ADMIN — FAMOTIDINE 20 MG: 20 TABLET ORAL at 04:51

## 2021-06-17 RX ADMIN — FAMOTIDINE 20 MG: 20 TABLET ORAL at 17:08

## 2021-06-17 RX ADMIN — OXYCODONE HYDROCHLORIDE 15 MG: 10 TABLET ORAL at 01:03

## 2021-06-17 RX ADMIN — OXYCODONE HYDROCHLORIDE 10 MG: 10 TABLET ORAL at 17:08

## 2021-06-17 ASSESSMENT — ENCOUNTER SYMPTOMS
BACK PAIN: 1
EYES NEGATIVE: 1
BLOOD IN STOOL: 1
RESPIRATORY NEGATIVE: 1
CONSTITUTIONAL NEGATIVE: 1
ROS GI COMMENTS: LAST BM PTA
CARDIOVASCULAR NEGATIVE: 1
NEUROLOGICAL NEGATIVE: 1
PSYCHIATRIC NEGATIVE: 1
GASTROINTESTINAL NEGATIVE: 1

## 2021-06-17 ASSESSMENT — COGNITIVE AND FUNCTIONAL STATUS - GENERAL
DAILY ACTIVITIY SCORE: 11
TOILETING: A LOT
HELP NEEDED FOR BATHING: TOTAL
WALKING IN HOSPITAL ROOM: TOTAL
SUGGESTED CMS G CODE MODIFIER DAILY ACTIVITY: CL
MOVING TO AND FROM BED TO CHAIR: UNABLE
MOVING FROM LYING ON BACK TO SITTING ON SIDE OF FLAT BED: UNABLE
DRESSING REGULAR UPPER BODY CLOTHING: A LOT
EATING MEALS: A LITTLE
SUGGESTED CMS G CODE MODIFIER MOBILITY: CN
PERSONAL GROOMING: A LOT
MOBILITY SCORE: 6
CLIMB 3 TO 5 STEPS WITH RAILING: TOTAL
STANDING UP FROM CHAIR USING ARMS: TOTAL
TURNING FROM BACK TO SIDE WHILE IN FLAT BAD: UNABLE
DRESSING REGULAR LOWER BODY CLOTHING: TOTAL

## 2021-06-17 ASSESSMENT — ACTIVITIES OF DAILY LIVING (ADL): TOILETING: INDEPENDENT

## 2021-06-17 ASSESSMENT — GAIT ASSESSMENTS: GAIT LEVEL OF ASSIST: UNABLE TO PARTICIPATE

## 2021-06-17 ASSESSMENT — PAIN DESCRIPTION - PAIN TYPE
TYPE: ACUTE PAIN

## 2021-06-17 ASSESSMENT — FIBROSIS 4 INDEX: FIB4 SCORE: 4.8
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
## 2021-06-17 NOTE — ASSESSMENT & PLAN NOTE
Chronic condition treated with flomax.  6/17 Resumed maintenance medication.  6/20 Douglas placed.  6/24 Flomax increased.  6/25 Douglas placed for ongoing retention.  6/29 Trial douglas removal, failed, replaced for second time.  7/1 Added Hytrin.  7/4 Voiding post removal.  7/5 Hytrin stopped secondary hypotension.  7/7 Flomax decreased to home dose 0.4 mg.  Voiding without issue.

## 2021-06-17 NOTE — CARE PLAN
The patient is Stable - Low risk of patient condition declining or worsening    Shift Goals  Clinical Goals: pain management, sleep  Patient Goals: pain control   Family Goals: n/a    Progress made toward(s) clinical / shift goals:  Pain appears to be better under control today. Pt sleeping between Q2h turns.     Problem: Pain - Standard  Goal: Alleviation of pain or a reduction in pain to the patient’s comfort goal  Outcome: Progressing     Problem: Knowledge Deficit - Standard  Goal: Patient and family/care givers will demonstrate understanding of plan of care, disease process/condition, diagnostic tests and medications  Outcome: Progressing     Problem: Skin Integrity  Goal: Skin integrity is maintained or improved  Outcome: Progressing     Problem: Fall Risk  Goal: Patient will remain free from falls  Outcome: Progressing     Problem: Psychosocial  Goal: Patient's level of anxiety will decrease  Outcome: Progressing  Goal: Patient's ability to verbalize feelings about condition will improve  Outcome: Progressing  Goal: Patient's ability to re-evaluate and adapt role responsibilities will improve  Outcome: Progressing  Goal: Patient and family will demonstrate ability to cope with life altering diagnosis and/or procedure  Outcome: Progressing  Goal: Spiritual and cultural needs incorporated into hospitalization  Outcome: Progressing     Problem: Respiratory  Goal: Patient will achieve/maintain optimum respiratory ventilation and gas exchange  Outcome: Progressing     Problem: Nutrition  Goal: Patient's nutritional and fluid intake will be adequate or improve  Outcome: Progressing   Patient is not progressing towards the following goals:      Problem: Nutrition  Goal: Enteral nutrition will be maintained or improve  Outcome: Not Met

## 2021-06-18 ENCOUNTER — APPOINTMENT (OUTPATIENT)
Dept: RADIOLOGY | Facility: MEDICAL CENTER | Age: 56
DRG: 956 | End: 2021-06-18
Attending: NURSE PRACTITIONER
Payer: COMMERCIAL

## 2021-06-18 PROBLEM — R76.8 HEPATITIS C ANTIBODY POSITIVE IN BLOOD: Status: ACTIVE | Noted: 2021-06-18

## 2021-06-18 PROBLEM — I82.419 ACUTE DEEP VEIN THROMBOSIS (DVT) OF FEMORAL VEIN (HCC): Status: ACTIVE | Noted: 2021-06-18

## 2021-06-18 PROBLEM — I95.9 HYPOTENSION: Status: RESOLVED | Noted: 2021-06-15 | Resolved: 2021-06-18

## 2021-06-18 PROBLEM — G89.4 CHRONIC PAIN SYNDROME: Status: ACTIVE | Noted: 2021-06-18

## 2021-06-18 LAB
ANION GAP SERPL CALC-SCNC: 10 MMOL/L (ref 7–16)
ANION GAP SERPL CALC-SCNC: 7 MMOL/L (ref 7–16)
APTT PPP: 29.4 SEC (ref 24.7–36)
BASOPHILS # BLD AUTO: 0.2 % (ref 0–1.8)
BASOPHILS # BLD: 0.04 K/UL (ref 0–0.12)
BUN SERPL-MCNC: 15 MG/DL (ref 8–22)
BUN SERPL-MCNC: 16 MG/DL (ref 8–22)
CALCIUM SERPL-MCNC: 7.9 MG/DL (ref 8.5–10.5)
CALCIUM SERPL-MCNC: 8 MG/DL (ref 8.5–10.5)
CHLORIDE SERPL-SCNC: 95 MMOL/L (ref 96–112)
CHLORIDE SERPL-SCNC: 96 MMOL/L (ref 96–112)
CO2 SERPL-SCNC: 22 MMOL/L (ref 20–33)
CO2 SERPL-SCNC: 23 MMOL/L (ref 20–33)
CREAT SERPL-MCNC: 0.7 MG/DL (ref 0.5–1.4)
CREAT SERPL-MCNC: 0.79 MG/DL (ref 0.5–1.4)
EOSINOPHIL # BLD AUTO: 0.03 K/UL (ref 0–0.51)
EOSINOPHIL NFR BLD: 0.2 % (ref 0–6.9)
ERYTHROCYTE [DISTWIDTH] IN BLOOD BY AUTOMATED COUNT: 45.2 FL (ref 35.9–50)
ERYTHROCYTE [DISTWIDTH] IN BLOOD BY AUTOMATED COUNT: 46.1 FL (ref 35.9–50)
GLUCOSE SERPL-MCNC: 105 MG/DL (ref 65–99)
GLUCOSE SERPL-MCNC: 111 MG/DL (ref 65–99)
HCT VFR BLD AUTO: 24.9 % (ref 42–52)
HCT VFR BLD AUTO: 25.1 % (ref 42–52)
HGB BLD-MCNC: 8.1 G/DL (ref 14–18)
HGB BLD-MCNC: 8.4 G/DL (ref 14–18)
IMM GRANULOCYTES # BLD AUTO: 0.15 K/UL (ref 0–0.11)
IMM GRANULOCYTES NFR BLD AUTO: 0.9 % (ref 0–0.9)
INR PPP: 1.17 (ref 0.87–1.13)
LYMPHOCYTES # BLD AUTO: 1.03 K/UL (ref 1–4.8)
LYMPHOCYTES NFR BLD: 6.4 % (ref 22–41)
MCH RBC QN AUTO: 29.6 PG (ref 27–33)
MCH RBC QN AUTO: 30 PG (ref 27–33)
MCHC RBC AUTO-ENTMCNC: 32.5 G/DL (ref 33.7–35.3)
MCHC RBC AUTO-ENTMCNC: 33.5 G/DL (ref 33.7–35.3)
MCV RBC AUTO: 89.6 FL (ref 81.4–97.8)
MCV RBC AUTO: 90.9 FL (ref 81.4–97.8)
MONOCYTES # BLD AUTO: 1.11 K/UL (ref 0–0.85)
MONOCYTES NFR BLD AUTO: 6.8 % (ref 0–13.4)
NEUTROPHILS # BLD AUTO: 13.86 K/UL (ref 1.82–7.42)
NEUTROPHILS NFR BLD: 85.5 % (ref 44–72)
NRBC # BLD AUTO: 0 K/UL
NRBC BLD-RTO: 0 /100 WBC
NT-PROBNP SERPL IA-MCNC: 255 PG/ML (ref 0–125)
PLATELET # BLD AUTO: 108 K/UL (ref 164–446)
PLATELET # BLD AUTO: 115 K/UL (ref 164–446)
PMV BLD AUTO: 10.5 FL (ref 9–12.9)
PMV BLD AUTO: 10.7 FL (ref 9–12.9)
POTASSIUM SERPL-SCNC: 3.8 MMOL/L (ref 3.6–5.5)
POTASSIUM SERPL-SCNC: 4.1 MMOL/L (ref 3.6–5.5)
PROTHROMBIN TIME: 14.6 SEC (ref 12–14.6)
RBC # BLD AUTO: 2.74 M/UL (ref 4.7–6.1)
RBC # BLD AUTO: 2.8 M/UL (ref 4.7–6.1)
SODIUM SERPL-SCNC: 125 MMOL/L (ref 135–145)
SODIUM SERPL-SCNC: 128 MMOL/L (ref 135–145)
UFH PPP CHRO-ACNC: <0.1 IU/ML
WBC # BLD AUTO: 15.8 K/UL (ref 4.8–10.8)
WBC # BLD AUTO: 16.2 K/UL (ref 4.8–10.8)

## 2021-06-18 PROCEDURE — 85520 HEPARIN ASSAY: CPT

## 2021-06-18 PROCEDURE — 700102 HCHG RX REV CODE 250 W/ 637 OVERRIDE(OP): Performed by: SURGERY

## 2021-06-18 PROCEDURE — 700101 HCHG RX REV CODE 250: Performed by: NURSE PRACTITIONER

## 2021-06-18 PROCEDURE — 71045 X-RAY EXAM CHEST 1 VIEW: CPT

## 2021-06-18 PROCEDURE — 85730 THROMBOPLASTIN TIME PARTIAL: CPT

## 2021-06-18 PROCEDURE — 36415 COLL VENOUS BLD VENIPUNCTURE: CPT

## 2021-06-18 PROCEDURE — 85027 COMPLETE CBC AUTOMATED: CPT

## 2021-06-18 PROCEDURE — 92523 SPEECH SOUND LANG COMPREHEN: CPT

## 2021-06-18 PROCEDURE — 83880 ASSAY OF NATRIURETIC PEPTIDE: CPT

## 2021-06-18 PROCEDURE — 700111 HCHG RX REV CODE 636 W/ 250 OVERRIDE (IP): Performed by: SURGERY

## 2021-06-18 PROCEDURE — 93970 EXTREMITY STUDY: CPT

## 2021-06-18 PROCEDURE — 80048 BASIC METABOLIC PNL TOTAL CA: CPT | Mod: 91

## 2021-06-18 PROCEDURE — 700102 HCHG RX REV CODE 250 W/ 637 OVERRIDE(OP): Performed by: NURSE PRACTITIONER

## 2021-06-18 PROCEDURE — A9270 NON-COVERED ITEM OR SERVICE: HCPCS | Performed by: NURSE PRACTITIONER

## 2021-06-18 PROCEDURE — A9270 NON-COVERED ITEM OR SERVICE: HCPCS | Performed by: SURGERY

## 2021-06-18 PROCEDURE — 94669 MECHANICAL CHEST WALL OSCILL: CPT

## 2021-06-18 PROCEDURE — 700111 HCHG RX REV CODE 636 W/ 250 OVERRIDE (IP): Performed by: NURSE PRACTITIONER

## 2021-06-18 PROCEDURE — 85025 COMPLETE CBC W/AUTO DIFF WBC: CPT

## 2021-06-18 PROCEDURE — 770006 HCHG ROOM/CARE - MED/SURG/GYN SEMI*

## 2021-06-18 PROCEDURE — 85610 PROTHROMBIN TIME: CPT

## 2021-06-18 RX ORDER — HEPARIN SODIUM 5000 [USP'U]/100ML
0-30 INJECTION, SOLUTION INTRAVENOUS CONTINUOUS
Status: DISCONTINUED | OUTPATIENT
Start: 2021-06-18 | End: 2021-06-20

## 2021-06-18 RX ORDER — FUROSEMIDE 20 MG/1
20 TABLET ORAL DAILY
Status: DISCONTINUED | OUTPATIENT
Start: 2021-06-19 | End: 2021-07-14 | Stop reason: HOSPADM

## 2021-06-18 RX ORDER — HEPARIN SODIUM 1000 [USP'U]/ML
40 INJECTION, SOLUTION INTRAVENOUS; SUBCUTANEOUS PRN
Status: DISCONTINUED | OUTPATIENT
Start: 2021-06-18 | End: 2021-06-20

## 2021-06-18 RX ORDER — HYDROMORPHONE HYDROCHLORIDE 2 MG/1
2 TABLET ORAL
Status: DISCONTINUED | OUTPATIENT
Start: 2021-06-18 | End: 2021-06-20

## 2021-06-18 RX ORDER — HYDROMORPHONE HYDROCHLORIDE 1 MG/ML
1 INJECTION, SOLUTION INTRAMUSCULAR; INTRAVENOUS; SUBCUTANEOUS
Status: DISCONTINUED | OUTPATIENT
Start: 2021-06-18 | End: 2021-06-18

## 2021-06-18 RX ORDER — BISACODYL 10 MG
10 SUPPOSITORY, RECTAL RECTAL ONCE
Status: COMPLETED | OUTPATIENT
Start: 2021-06-18 | End: 2021-06-18

## 2021-06-18 RX ADMIN — CLOPIDOGREL BISULFATE 75 MG: 75 TABLET ORAL at 05:40

## 2021-06-18 RX ADMIN — MORPHINE SULFATE 30 MG: 30 TABLET, FILM COATED, EXTENDED RELEASE ORAL at 12:37

## 2021-06-18 RX ADMIN — HYDROMORPHONE HYDROCHLORIDE 2 MG: 2 TABLET ORAL at 23:47

## 2021-06-18 RX ADMIN — HYDROMORPHONE HYDROCHLORIDE 2 MG: 2 TABLET ORAL at 17:00

## 2021-06-18 RX ADMIN — GABAPENTIN 300 MG: 300 CAPSULE ORAL at 05:40

## 2021-06-18 RX ADMIN — POLYETHYLENE GLYCOL 3350 1 PACKET: 17 POWDER, FOR SOLUTION ORAL at 17:00

## 2021-06-18 RX ADMIN — GABAPENTIN 300 MG: 300 CAPSULE ORAL at 12:37

## 2021-06-18 RX ADMIN — METAXALONE 800 MG: 800 TABLET ORAL at 12:37

## 2021-06-18 RX ADMIN — MAGNESIUM HYDROXIDE 30 ML: 400 SUSPENSION ORAL at 05:39

## 2021-06-18 RX ADMIN — POTASSIUM CHLORIDE 20 MEQ: 1500 TABLET, EXTENDED RELEASE ORAL at 05:40

## 2021-06-18 RX ADMIN — HEPARIN SODIUM 18 UNITS/KG/HR: 5000 INJECTION, SOLUTION INTRAVENOUS at 23:38

## 2021-06-18 RX ADMIN — MORPHINE SULFATE 30 MG: 30 TABLET, FILM COATED, EXTENDED RELEASE ORAL at 21:25

## 2021-06-18 RX ADMIN — FUROSEMIDE 40 MG: 40 TABLET ORAL at 05:40

## 2021-06-18 RX ADMIN — TAMSULOSIN HYDROCHLORIDE 0.4 MG: 0.4 CAPSULE ORAL at 05:40

## 2021-06-18 RX ADMIN — OXYCODONE HYDROCHLORIDE 15 MG: 10 TABLET ORAL at 09:07

## 2021-06-18 RX ADMIN — ENOXAPARIN SODIUM 30 MG: 30 INJECTION SUBCUTANEOUS at 05:39

## 2021-06-18 RX ADMIN — DOCUSATE SODIUM 100 MG: 100 CAPSULE ORAL at 17:00

## 2021-06-18 RX ADMIN — POLYETHYLENE GLYCOL 3350 1 PACKET: 17 POWDER, FOR SOLUTION ORAL at 05:40

## 2021-06-18 RX ADMIN — DOCUSATE SODIUM 100 MG: 100 CAPSULE ORAL at 05:40

## 2021-06-18 RX ADMIN — METAXALONE 800 MG: 800 TABLET ORAL at 17:00

## 2021-06-18 RX ADMIN — LIDOCAINE 3 PATCH: 50 PATCH TOPICAL at 12:37

## 2021-06-18 RX ADMIN — GABAPENTIN 300 MG: 300 CAPSULE ORAL at 17:00

## 2021-06-18 RX ADMIN — OXYCODONE HYDROCHLORIDE 15 MG: 10 TABLET ORAL at 05:40

## 2021-06-18 RX ADMIN — FAMOTIDINE 20 MG: 20 TABLET ORAL at 17:00

## 2021-06-18 RX ADMIN — FAMOTIDINE 20 MG: 20 TABLET ORAL at 05:40

## 2021-06-18 RX ADMIN — METAXALONE 800 MG: 800 TABLET ORAL at 05:39

## 2021-06-18 RX ADMIN — BISACODYL 10 MG: 10 SUPPOSITORY RECTAL at 12:38

## 2021-06-18 RX ADMIN — DOCUSATE SODIUM 50 MG AND SENNOSIDES 8.6 MG 1 TABLET: 8.6; 5 TABLET, FILM COATED ORAL at 21:25

## 2021-06-18 ASSESSMENT — ENCOUNTER SYMPTOMS
RESPIRATORY NEGATIVE: 1
DIARRHEA: 0
EYES NEGATIVE: 1
NEUROLOGICAL NEGATIVE: 1
ABDOMINAL PAIN: 0
ROS GI COMMENTS: LAST BM PTA
GASTROINTESTINAL NEGATIVE: 1
BACK PAIN: 1
CONSTITUTIONAL NEGATIVE: 1
MYALGIAS: 1
BLOOD IN STOOL: 0
PSYCHIATRIC NEGATIVE: 1
CARDIOVASCULAR NEGATIVE: 1
VOMITING: 0
CONSTIPATION: 1

## 2021-06-18 ASSESSMENT — PAIN DESCRIPTION - PAIN TYPE: TYPE: ACUTE PAIN
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
## 2021-06-18 NOTE — PROGRESS NOTES
"Orthopaedic Progress Note    Author: Juanjo Logan P.A.-C. Date & Time created: 6/17/2021   5:13 PM     Interval Events:  Patient doing well  Dressings CDI  Pending plateau and wrist ORIF      Review of Systems   Constitutional: Negative.    Cardiovascular: Chest pain: rib fx    Gastrointestinal: Negative.    Musculoskeletal:        Pain well controlled    Neurological: Negative.      Hemodynamics:  /85   Pulse 79   Temp 37.6 °C (99.7 °F) (Temporal)   Resp 18   Ht 1.753 m (5' 9.02\")   Wt 72.4 kg (159 lb 9.8 oz)   SpO2 97%      No Active Precaution Orders    Respiratory:    Respiration: 18, Pulse Oximetry: 97 %     Work Of Breathing / Effort: Within Normal Limits  RUL Breath Sounds: Clear, RML Breath Sounds: Diminished, RLL Breath Sounds: Diminished, ROD Breath Sounds: Clear, LLL Breath Sounds: Diminished    Physical Exam   HENT:   Head: Normocephalic and atraumatic.   Pulmonary/Chest: He exhibits tenderness (rib fx ).   Musculoskeletal:      Comments: RUE splint CDI, DNVI, moves all fingers, cap refill <2 sec. RLE and pelvic dressing CDI, DNVI, cap refill <2 sec.      Labs:  Recent Labs     06/15/21  0515 06/16/21  0555 06/17/21  1155   WBC 15.4* 12.4* 12.2*   RBC 3.22* 2.71* 2.77*   HEMOGLOBIN 9.8* 8.1* 8.3*   HEMATOCRIT 29.5* 24.5* 24.5*   MCV 91.6 90.4 88.4   MCH 30.4 29.9 30.0   MCHC 33.2* 33.1* 33.9   RDW 48.2 47.7 44.7   PLATELETCT 143* 117* 110*   MPV 10.6 11.0 10.3     Recent Labs     06/15/21  0515 06/16/21  0555 06/17/21  1155   SODIUM 133* 130* 132*   POTASSIUM 3.9 4.1 4.0   CHLORIDE 101 98 101   CO2 24 23 25   GLUCOSE 118* 99 119*   BUN 17 20 14   CREATININE 0.97 0.81 0.72   CALCIUM 8.3* 8.1* 8.1*       Medical Decision Making/Problem List:    Active Hospital Problems    Diagnosis    • Hypotension [I95.9]    • Status post cardiac surgery [Z98.890]    • Respiratory failure following trauma ([facility]) [J96.90]    • Closed fracture of right femur ([facility]) [S72.91XA]    • Closed fracture of distal end " of right radius [S52.501A]    • Screening examination for infectious disease [Z11.9]    • Contraindication to deep vein thrombosis (DVT) prophylaxis [Z53.09]    • Eyelid laceration, right, initial encounter [S01.111A]    • Wedge fracture of lumbar vertebra (HCC) [S32.000A]    • 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  Clearance for lovenox/heparin: per trauma   Weight Bearing Status: NWB RUE and RLE  Wounds & Drains: dressings changed every other day by nursing  Disposition and Follow-up: per therapy recs
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
## 2021-06-18 NOTE — ASSESSMENT & PLAN NOTE
Patient has admitted to use of IV heroin.  Do not consider opioid naive.  7/12 Weaning MS Contin, to complete 7/15.  - Illicit drug use paraphernalia and visitor found in room again.  - Visitor escorted out.  - MSContin ceased.  - Weaning dose of dilaudid to complete 7/15.  7/13 Pt refusing gabapentin and lidocaine, stopped.  Follow up with addiction specialist or methadone clinic upon discharge.

## 2021-06-18 NOTE — CARE PLAN
Problem: Pain - Standard  Goal: Alleviation of pain or a reduction in pain to the patient’s comfort goal  Outcome: Progressing     Problem: Fall Risk  Goal: Patient will remain free from falls  Outcome: Progressing   The patient is Stable - Low risk of patient condition declining or worsening    Shift Goals  Clinical Goals: pain control, mobilization  Patient Goals: pain control  Family Goals: n/a    Progress made toward(s) clinical / shift goals:  Trauma adjusted pain medications see MAR. Frequent rounding, bed alarm on, pt calls appropriately.     Patient is not progressing towards the following goals:

## 2021-06-18 NOTE — ASSESSMENT & PLAN NOTE
"Prophylactic anticoagulation for thrombotic prevention initially contraindicated secondary to elevated bleeding risk.  RAP score 12.  6/16 Prophylactic dose enoxaparin initiated.  6/18 Acute DVT seen in left common femoral and femoral veins. The proximal segment of the thrombus appears as as \"free floating tail.\"  - Lovenox stopped.  - Heparin weight-based protocol initiated.  6/20 Heparin Xa levels remain subtherapeutic. Initiate weight based lovenox dosing.  6/28 Transition to Xarelto.  7/13 Outpatient anticoagulation clinic referral placed.  Follow up with anticoagulation clinic upon discharge.  "
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
## 2021-06-18 NOTE — PROGRESS NOTES
"Orthopaedic Progress Note    Author: Juanjo Logan P.A.-C. Date & Time created: 6/18/2021   4:04 PM     Interval Events:  Patient doing well  Dressings CDI  Pending plateau and wrist ORIF next week    Review of Systems   Constitutional: Negative.    Cardiovascular: Chest pain: rib fx    Gastrointestinal: Negative.    Musculoskeletal:        Pain well controlled    Neurological: Negative.      Hemodynamics:  /74   Pulse 99   Temp 37.3 °C (99.2 °F) (Oral)   Resp 16   Ht 1.753 m (5' 9.02\")   Wt 72.4 kg (159 lb 9.8 oz)   SpO2 93%      No Active Precaution Orders    Respiratory:    Respiration: 16, Pulse Oximetry: 93 %     Work Of Breathing / Effort: Within Normal Limits  RUL Breath Sounds: Clear, RML Breath Sounds: Clear, RLL Breath Sounds: Diminished, ROD Breath Sounds: Clear, LLL Breath Sounds: Diminished    Physical Exam   HENT:   Head: Normocephalic and atraumatic.   Pulmonary/Chest: He exhibits tenderness (rib fx ).   Musculoskeletal:      Comments: RUE splint CDI, DNVI, moves all fingers, cap refill <2 sec. RLE and pelvic dressing CDI, DNVI, cap refill <2 sec.      Labs:  Recent Labs     06/17/21  1155 06/18/21  0732 06/18/21  1445   WBC 12.2* 16.2* 15.8*   RBC 2.77* 2.80* 2.74*   HEMOGLOBIN 8.3* 8.4* 8.1*   HEMATOCRIT 24.5* 25.1* 24.9*   MCV 88.4 89.6 90.9   MCH 30.0 30.0 29.6   MCHC 33.9 33.5* 32.5*   RDW 44.7 45.2 46.1   PLATELETCT 110* 108* 115*   MPV 10.3 10.7 10.5     Recent Labs     06/17/21  1155 06/18/21  0732 06/18/21  1445   SODIUM 132* 125* 128*   POTASSIUM 4.0 3.8 4.1   CHLORIDE 101 95* 96   CO2 25 23 22   GLUCOSE 119* 105* 111*   BUN 14 15 16   CREATININE 0.72 0.70 0.79   CALCIUM 8.1* 8.0* 7.9*       Medical Decision Making/Problem List:    Active Hospital Problems    Diagnosis    • Hypotension [I95.9]    • Status post cardiac surgery [Z98.890]    • Respiratory failure following trauma ([facility]) [J96.90]    • Closed fracture of right femur ([facility]) [S72.91XA]    • Closed fracture of distal " end of right radius [S52.501A]    • Screening examination for infectious disease [Z11.9]    • Contraindication to deep vein thrombosis (DVT) prophylaxis [Z53.09]    • Eyelid laceration, right, initial encounter [S01.111A]    • Wedge fracture of lumbar vertebra (HCC) [S32.000A]    • 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  Clearance for lovenox/heparin: per trauma   Weight Bearing Status: NWB RUE and RLE  Wounds & Drains: dressings changed every other day by nursing  Disposition and Follow-up: per therapy recs

## 2021-06-19 PROBLEM — Z02.9 DISCHARGE PLANNING ISSUES: Status: ACTIVE | Noted: 2021-06-19

## 2021-06-19 PROBLEM — Z78.9 NO CONTRAINDICATION TO DEEP VEIN THROMBOSIS (DVT) PROPHYLAXIS: Status: RESOLVED | Noted: 2021-06-14 | Resolved: 2021-06-19

## 2021-06-19 PROBLEM — J93.9 PNEUMOTHORAX ON RIGHT: Status: RESOLVED | Noted: 2021-06-14 | Resolved: 2021-06-19

## 2021-06-19 PROBLEM — Z11.9 SCREENING EXAMINATION FOR INFECTIOUS DISEASE: Status: RESOLVED | Noted: 2021-06-14 | Resolved: 2021-06-19

## 2021-06-19 PROBLEM — J96.90 RESPIRATORY FAILURE FOLLOWING TRAUMA (HCC): Status: RESOLVED | Noted: 2021-06-14 | Resolved: 2021-06-19

## 2021-06-19 PROBLEM — Z75.8 DISCHARGE PLANNING ISSUES: Status: ACTIVE | Noted: 2021-06-19

## 2021-06-19 LAB
ANION GAP SERPL CALC-SCNC: 9 MMOL/L (ref 7–16)
BASOPHILS # BLD AUTO: 0.3 % (ref 0–1.8)
BASOPHILS # BLD AUTO: 0.6 % (ref 0–1.8)
BASOPHILS # BLD: 0.04 K/UL (ref 0–0.12)
BASOPHILS # BLD: 0.08 K/UL (ref 0–0.12)
BUN SERPL-MCNC: 17 MG/DL (ref 8–22)
CALCIUM SERPL-MCNC: 7.9 MG/DL (ref 8.5–10.5)
CHLORIDE SERPL-SCNC: 99 MMOL/L (ref 96–112)
CO2 SERPL-SCNC: 25 MMOL/L (ref 20–33)
CREAT SERPL-MCNC: 0.67 MG/DL (ref 0.5–1.4)
EOSINOPHIL # BLD AUTO: 0.13 K/UL (ref 0–0.51)
EOSINOPHIL # BLD AUTO: 0.19 K/UL (ref 0–0.51)
EOSINOPHIL NFR BLD: 1 % (ref 0–6.9)
EOSINOPHIL NFR BLD: 1.5 % (ref 0–6.9)
ERYTHROCYTE [DISTWIDTH] IN BLOOD BY AUTOMATED COUNT: 44.7 FL (ref 35.9–50)
ERYTHROCYTE [DISTWIDTH] IN BLOOD BY AUTOMATED COUNT: 45.1 FL (ref 35.9–50)
GLUCOSE SERPL-MCNC: 131 MG/DL (ref 65–99)
HCT VFR BLD AUTO: 21.8 % (ref 42–52)
HCT VFR BLD AUTO: 21.9 % (ref 42–52)
HGB BLD-MCNC: 7.2 G/DL (ref 14–18)
HGB BLD-MCNC: 7.3 G/DL (ref 14–18)
IMM GRANULOCYTES # BLD AUTO: 0.24 K/UL (ref 0–0.11)
IMM GRANULOCYTES # BLD AUTO: 0.35 K/UL (ref 0–0.11)
IMM GRANULOCYTES NFR BLD AUTO: 1.9 % (ref 0–0.9)
IMM GRANULOCYTES NFR BLD AUTO: 2.6 % (ref 0–0.9)
LYMPHOCYTES # BLD AUTO: 1.04 K/UL (ref 1–4.8)
LYMPHOCYTES # BLD AUTO: 1.07 K/UL (ref 1–4.8)
LYMPHOCYTES NFR BLD: 8.1 % (ref 22–41)
LYMPHOCYTES NFR BLD: 8.3 % (ref 22–41)
MCH RBC QN AUTO: 29.4 PG (ref 27–33)
MCH RBC QN AUTO: 29.5 PG (ref 27–33)
MCHC RBC AUTO-ENTMCNC: 33 G/DL (ref 33.7–35.3)
MCHC RBC AUTO-ENTMCNC: 33.3 G/DL (ref 33.7–35.3)
MCV RBC AUTO: 88.3 FL (ref 81.4–97.8)
MCV RBC AUTO: 89.3 FL (ref 81.4–97.8)
MONOCYTES # BLD AUTO: 0.99 K/UL (ref 0–0.85)
MONOCYTES # BLD AUTO: 1.06 K/UL (ref 0–0.85)
MONOCYTES NFR BLD AUTO: 7.5 % (ref 0–13.4)
MONOCYTES NFR BLD AUTO: 8.5 % (ref 0–13.4)
NEUTROPHILS # BLD AUTO: 10.68 K/UL (ref 1.82–7.42)
NEUTROPHILS # BLD AUTO: 9.85 K/UL (ref 1.82–7.42)
NEUTROPHILS NFR BLD: 79.2 % (ref 44–72)
NEUTROPHILS NFR BLD: 80.5 % (ref 44–72)
NRBC # BLD AUTO: 0 K/UL
NRBC # BLD AUTO: 0 K/UL
NRBC BLD-RTO: 0 /100 WBC
NRBC BLD-RTO: 0 /100 WBC
PLATELET # BLD AUTO: 126 K/UL (ref 164–446)
PLATELET # BLD AUTO: 130 K/UL (ref 164–446)
PMV BLD AUTO: 10.7 FL (ref 9–12.9)
PMV BLD AUTO: 11.4 FL (ref 9–12.9)
POTASSIUM SERPL-SCNC: 3.9 MMOL/L (ref 3.6–5.5)
RBC # BLD AUTO: 2.44 M/UL (ref 4.7–6.1)
RBC # BLD AUTO: 2.48 M/UL (ref 4.7–6.1)
SODIUM SERPL-SCNC: 133 MMOL/L (ref 135–145)
UFH PPP CHRO-ACNC: <0.1 IU/ML
WBC # BLD AUTO: 12.5 K/UL (ref 4.8–10.8)
WBC # BLD AUTO: 13.3 K/UL (ref 4.8–10.8)

## 2021-06-19 PROCEDURE — A9270 NON-COVERED ITEM OR SERVICE: HCPCS | Performed by: NURSE PRACTITIONER

## 2021-06-19 PROCEDURE — 94760 N-INVAS EAR/PLS OXIMETRY 1: CPT

## 2021-06-19 PROCEDURE — 700102 HCHG RX REV CODE 250 W/ 637 OVERRIDE(OP): Performed by: NURSE PRACTITIONER

## 2021-06-19 PROCEDURE — 770006 HCHG ROOM/CARE - MED/SURG/GYN SEMI*

## 2021-06-19 PROCEDURE — 80048 BASIC METABOLIC PNL TOTAL CA: CPT

## 2021-06-19 PROCEDURE — 700102 HCHG RX REV CODE 250 W/ 637 OVERRIDE(OP): Performed by: SURGERY

## 2021-06-19 PROCEDURE — 700101 HCHG RX REV CODE 250: Performed by: NURSE PRACTITIONER

## 2021-06-19 PROCEDURE — 700111 HCHG RX REV CODE 636 W/ 250 OVERRIDE (IP): Performed by: NURSE PRACTITIONER

## 2021-06-19 PROCEDURE — 36415 COLL VENOUS BLD VENIPUNCTURE: CPT

## 2021-06-19 PROCEDURE — 85025 COMPLETE CBC W/AUTO DIFF WBC: CPT

## 2021-06-19 PROCEDURE — A9270 NON-COVERED ITEM OR SERVICE: HCPCS | Performed by: SURGERY

## 2021-06-19 PROCEDURE — 85520 HEPARIN ASSAY: CPT

## 2021-06-19 RX ORDER — BISACODYL 10 MG
10 SUPPOSITORY, RECTAL RECTAL ONCE
Status: DISPENSED | OUTPATIENT
Start: 2021-06-19 | End: 2021-06-20

## 2021-06-19 RX ADMIN — POLYETHYLENE GLYCOL 3350 1 PACKET: 17 POWDER, FOR SOLUTION ORAL at 16:55

## 2021-06-19 RX ADMIN — HEPARIN SODIUM 26 UNITS/KG/HR: 5000 INJECTION, SOLUTION INTRAVENOUS at 14:42

## 2021-06-19 RX ADMIN — HEPARIN SODIUM 26 UNITS/KG/HR: 5000 INJECTION, SOLUTION INTRAVENOUS at 19:12

## 2021-06-19 RX ADMIN — GABAPENTIN 300 MG: 300 CAPSULE ORAL at 04:13

## 2021-06-19 RX ADMIN — HYDROMORPHONE HYDROCHLORIDE 2 MG: 2 TABLET ORAL at 07:45

## 2021-06-19 RX ADMIN — DOCUSATE SODIUM 50 MG AND SENNOSIDES 8.6 MG 1 TABLET: 8.6; 5 TABLET, FILM COATED ORAL at 11:42

## 2021-06-19 RX ADMIN — HEPARIN SODIUM 2900 UNITS: 1000 INJECTION, SOLUTION INTRAVENOUS; SUBCUTANEOUS at 21:11

## 2021-06-19 RX ADMIN — FAMOTIDINE 20 MG: 20 TABLET ORAL at 04:13

## 2021-06-19 RX ADMIN — METAXALONE 800 MG: 800 TABLET ORAL at 04:13

## 2021-06-19 RX ADMIN — HYDROMORPHONE HYDROCHLORIDE 2 MG: 2 TABLET ORAL at 13:43

## 2021-06-19 RX ADMIN — MORPHINE SULFATE 30 MG: 30 TABLET, FILM COATED, EXTENDED RELEASE ORAL at 22:03

## 2021-06-19 RX ADMIN — MAGNESIUM HYDROXIDE 30 ML: 400 SUSPENSION ORAL at 04:13

## 2021-06-19 RX ADMIN — HYDROMORPHONE HYDROCHLORIDE 2 MG: 2 TABLET ORAL at 22:24

## 2021-06-19 RX ADMIN — GABAPENTIN 300 MG: 300 CAPSULE ORAL at 11:35

## 2021-06-19 RX ADMIN — HEPARIN SODIUM 2900 UNITS: 1000 INJECTION, SOLUTION INTRAVENOUS; SUBCUTANEOUS at 14:46

## 2021-06-19 RX ADMIN — HEPARIN SODIUM 22 UNITS/KG/HR: 5000 INJECTION, SOLUTION INTRAVENOUS at 09:48

## 2021-06-19 RX ADMIN — HYDROMORPHONE HYDROCHLORIDE 2 MG: 2 TABLET ORAL at 04:04

## 2021-06-19 RX ADMIN — MAGNESIUM CITRATE 148 ML: 1.75 LIQUID ORAL at 14:30

## 2021-06-19 RX ADMIN — GABAPENTIN 300 MG: 300 CAPSULE ORAL at 16:55

## 2021-06-19 RX ADMIN — HYDROMORPHONE HYDROCHLORIDE 2 MG: 2 TABLET ORAL at 19:42

## 2021-06-19 RX ADMIN — POLYETHYLENE GLYCOL 3350 1 PACKET: 17 POWDER, FOR SOLUTION ORAL at 04:13

## 2021-06-19 RX ADMIN — HEPARIN SODIUM 2900 UNITS: 1000 INJECTION, SOLUTION INTRAVENOUS; SUBCUTANEOUS at 09:59

## 2021-06-19 RX ADMIN — METAXALONE 800 MG: 800 TABLET ORAL at 18:00

## 2021-06-19 RX ADMIN — POTASSIUM CHLORIDE 20 MEQ: 1500 TABLET, EXTENDED RELEASE ORAL at 04:13

## 2021-06-19 RX ADMIN — MORPHINE SULFATE 30 MG: 30 TABLET, FILM COATED, EXTENDED RELEASE ORAL at 11:35

## 2021-06-19 RX ADMIN — DOCUSATE SODIUM 50 MG AND SENNOSIDES 8.6 MG 1 TABLET: 8.6; 5 TABLET, FILM COATED ORAL at 19:42

## 2021-06-19 RX ADMIN — METAXALONE 800 MG: 800 TABLET ORAL at 11:35

## 2021-06-19 RX ADMIN — TAMSULOSIN HYDROCHLORIDE 0.4 MG: 0.4 CAPSULE ORAL at 04:17

## 2021-06-19 RX ADMIN — HEPARIN SODIUM 30 UNITS/KG/HR: 5000 INJECTION, SOLUTION INTRAVENOUS at 21:07

## 2021-06-19 RX ADMIN — DOCUSATE SODIUM 100 MG: 100 CAPSULE ORAL at 04:13

## 2021-06-19 ASSESSMENT — PAIN DESCRIPTION - PAIN TYPE
TYPE: ACUTE PAIN;SURGICAL PAIN
TYPE: ACUTE PAIN

## 2021-06-19 ASSESSMENT — ENCOUNTER SYMPTOMS
EYES NEGATIVE: 1
VOMITING: 0
BACK PAIN: 1
PSYCHIATRIC NEGATIVE: 1
RESPIRATORY NEGATIVE: 1
CONSTITUTIONAL NEGATIVE: 1
CONSTIPATION: 1
ROS GI COMMENTS: LAST BM PTA
ABDOMINAL PAIN: 0
NEUROLOGICAL NEGATIVE: 1
CHILLS: 0
BLOOD IN STOOL: 0
CARDIOVASCULAR NEGATIVE: 1
DIARRHEA: 0
MYALGIAS: 1
GASTROINTESTINAL NEGATIVE: 1
FEVER: 0

## 2021-06-19 ASSESSMENT — PATIENT HEALTH QUESTIONNAIRE - PHQ9
SUM OF ALL RESPONSES TO PHQ9 QUESTIONS 1 AND 2: 0
1. LITTLE INTEREST OR PLEASURE IN DOING THINGS: NOT AT ALL

## 2021-06-19 NOTE — CARE PLAN
Problem: Pain - Standard  Goal: Alleviation of pain or a reduction in pain to the patient’s comfort goal  Outcome: Progressing     Problem: Fall Risk  Goal: Patient will remain free from falls  Outcome: Progressing   The patient is Watcher - Medium risk of patient condition declining or worsening    Shift Goals  Clinical Goals: pain control, mobilization  Patient Goals: pain control  Family Goals: n/a    Progress made toward(s) clinical / shift goals:  free of falls    Patient is not progressing towards the following goals:

## 2021-06-19 NOTE — PROGRESS NOTES
Pt has been refusing lab draw all night.   Last Xa was 1625 yesterday.  Currently trying to contact physician  Phlebotomy at bedside trying to get labs at the moment  Pharm notified and stated we need guidance from MD to make any decision on heparin drip.
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
## 2021-06-19 NOTE — ASSESSMENT & PLAN NOTE
Date of admission: 6/14/2021  Date: 6/18 Transfer orders from SICU  Date: 6/19 SNF consult  Cleared for discharge: Yes - Date: 6/28  Discharge delayed: Yes - Reason: Accepting facility, [facility]   7/8 [place] [address] long term medicine service referral, # 8.  7/10 # 6.  7/12 # 5.  - Visitors not allowed. Found with visitor in room with illicit drug paraphernalia at bedside. Visitor escorted out by security.  7/13 Exploring the option of discharging home with a PCP and DME.  Discharge date: tbd

## 2021-06-19 NOTE — CARE PLAN
Problem: Pain - Standard  Goal: Alleviation of pain or a reduction in pain to the patient’s comfort goal  Outcome: Progressing     Problem: Fall Risk  Goal: Patient will remain free from falls  Outcome: Progressing     The patient is Watcher - Medium risk of patient condition declining or worsening    Shift Goals  Clinical Goals: pain control, mobilization  Patient Goals: pain control  Family Goals: n/a    Progress made toward(s) clinical / shift goals:  Fall precautions are in place.     Patient is not progressing towards the following goals:

## 2021-06-19 NOTE — PROGRESS NOTES
Pt 10/10 pain. Pt irritable and refusing to put on oxygen NC. Pt stating in mid 80's. Education provided and pt still refusing

## 2021-06-19 NOTE — PROGRESS NOTES
Began Heparin drip, IV inflitrated immediately. This RN attempted x1 for new IV with no success. HUDSON Fay called as she is US guide certified to place US guide IV.

## 2021-06-19 NOTE — PROGRESS NOTES
"Orthopaedic Progress Note    Author: Juanjo Logan P.A.-C. Date & Time created: 6/19/2021   4:28 PM     Interval Events:  Patient doing well  Dressings CDI  Pending L plateau and R wrist ORIF tuesday    Review of Systems   Constitutional: Negative.    Cardiovascular: Chest pain: rib fx    Gastrointestinal: Negative.    Musculoskeletal:        Pain well controlled    Neurological: Negative.      Hemodynamics:  /67   Pulse 82   Temp 36.6 °C (97.9 °F) (Temporal)   Resp 18   Ht 1.753 m (5' 9.02\")   Wt 72.4 kg (159 lb 9.8 oz)   SpO2 93%      No Active Precaution Orders    Respiratory:    Respiration: 18, Pulse Oximetry: 93 %     Work Of Breathing / Effort: Within Normal Limits  RUL Breath Sounds: Clear, RML Breath Sounds: Clear, RLL Breath Sounds: Clear;Diminished, ROD Breath Sounds: Clear, LLL Breath Sounds: Clear;Diminished    Physical Exam   HENT:   Head: Normocephalic and atraumatic.   Pulmonary/Chest: He exhibits tenderness (rib fx ).   Musculoskeletal:      Comments: RUE splint CDI, DNVI, moves all fingers, cap refill <2 sec. RLE and pelvic dressing CDI, DNVI, cap refill <2 sec.      Labs:  Recent Labs     06/18/21  1445 06/19/21  0850 06/19/21  1407   WBC 15.8* 13.3* 12.5*   RBC 2.74* 2.44* 2.48*   HEMOGLOBIN 8.1* 7.2* 7.3*   HEMATOCRIT 24.9* 21.8* 21.9*   MCV 90.9 89.3 88.3   MCH 29.6 29.5 29.4   MCHC 32.5* 33.0* 33.3*   RDW 46.1 44.7 45.1   PLATELETCT 115* 126* 130*   MPV 10.5 11.4 10.7     Recent Labs     06/18/21  0732 06/18/21  1445 06/19/21  0850   SODIUM 125* 128* 133*   POTASSIUM 3.8 4.1 3.9   CHLORIDE 95* 96 99   CO2 23 22 25   GLUCOSE 105* 111* 131*   BUN 15 16 17   CREATININE 0.70 0.79 0.67   CALCIUM 8.0* 7.9* 7.9*       Medical Decision Making/Problem List:    Active Hospital Problems    Diagnosis    • Hypotension [I95.9]    • Status post cardiac surgery [Z98.890]    • Respiratory failure following trauma ([facility]) [J96.90]    • Closed fracture of right femur ([facility]) [S72.91XA]    • Closed " fracture of distal end of right radius [S52.501A]    • Screening examination for infectious disease [Z11.9]    • Contraindication to deep vein thrombosis (DVT) prophylaxis [Z53.09]    • Eyelid laceration, right, initial encounter [S01.111A]    • Wedge fracture of lumbar vertebra (HCC) [S32.000A]    • 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  Clearance for lovenox/heparin: per trauma   Weight Bearing Status: NWB RUE and RLE  Wounds & Drains: dressings changed every other day by nursing  Disposition and Follow-up: per therapy recs

## 2021-06-20 LAB
ANION GAP SERPL CALC-SCNC: 9 MMOL/L (ref 7–16)
BASOPHILS # BLD AUTO: 0.5 % (ref 0–1.8)
BASOPHILS # BLD: 0.07 K/UL (ref 0–0.12)
BUN SERPL-MCNC: 15 MG/DL (ref 8–22)
CALCIUM SERPL-MCNC: 8 MG/DL (ref 8.5–10.5)
CHLORIDE SERPL-SCNC: 99 MMOL/L (ref 96–112)
CO2 SERPL-SCNC: 23 MMOL/L (ref 20–33)
CREAT SERPL-MCNC: 0.6 MG/DL (ref 0.5–1.4)
EOSINOPHIL # BLD AUTO: 0.2 K/UL (ref 0–0.51)
EOSINOPHIL NFR BLD: 1.5 % (ref 0–6.9)
ERYTHROCYTE [DISTWIDTH] IN BLOOD BY AUTOMATED COUNT: 47.4 FL (ref 35.9–50)
GLUCOSE SERPL-MCNC: 127 MG/DL (ref 65–99)
HCT VFR BLD AUTO: 24.6 % (ref 42–52)
HGB BLD-MCNC: 8 G/DL (ref 14–18)
IMM GRANULOCYTES # BLD AUTO: 0.42 K/UL (ref 0–0.11)
IMM GRANULOCYTES NFR BLD AUTO: 3.1 % (ref 0–0.9)
LYMPHOCYTES # BLD AUTO: 1.09 K/UL (ref 1–4.8)
LYMPHOCYTES NFR BLD: 8 % (ref 22–41)
MCH RBC QN AUTO: 29.5 PG (ref 27–33)
MCHC RBC AUTO-ENTMCNC: 32.5 G/DL (ref 33.7–35.3)
MCV RBC AUTO: 90.8 FL (ref 81.4–97.8)
MONOCYTES # BLD AUTO: 0.91 K/UL (ref 0–0.85)
MONOCYTES NFR BLD AUTO: 6.7 % (ref 0–13.4)
NEUTROPHILS # BLD AUTO: 10.89 K/UL (ref 1.82–7.42)
NEUTROPHILS NFR BLD: 80.2 % (ref 44–72)
NRBC # BLD AUTO: 0.02 K/UL
NRBC BLD-RTO: 0.1 /100 WBC
PLATELET # BLD AUTO: 170 K/UL (ref 164–446)
PMV BLD AUTO: 10.8 FL (ref 9–12.9)
POTASSIUM SERPL-SCNC: 4 MMOL/L (ref 3.6–5.5)
RBC # BLD AUTO: 2.71 M/UL (ref 4.7–6.1)
SODIUM SERPL-SCNC: 131 MMOL/L (ref 135–145)
UFH PPP CHRO-ACNC: <0.1 IU/ML
WBC # BLD AUTO: 13.6 K/UL (ref 4.8–10.8)

## 2021-06-20 PROCEDURE — A9270 NON-COVERED ITEM OR SERVICE: HCPCS | Performed by: NURSE PRACTITIONER

## 2021-06-20 PROCEDURE — A9270 NON-COVERED ITEM OR SERVICE: HCPCS | Performed by: STUDENT IN AN ORGANIZED HEALTH CARE EDUCATION/TRAINING PROGRAM

## 2021-06-20 PROCEDURE — 85520 HEPARIN ASSAY: CPT

## 2021-06-20 PROCEDURE — A9270 NON-COVERED ITEM OR SERVICE: HCPCS | Performed by: SURGERY

## 2021-06-20 PROCEDURE — 700102 HCHG RX REV CODE 250 W/ 637 OVERRIDE(OP): Performed by: SURGERY

## 2021-06-20 PROCEDURE — 770001 HCHG ROOM/CARE - MED/SURG/GYN PRIV*

## 2021-06-20 PROCEDURE — 700102 HCHG RX REV CODE 250 W/ 637 OVERRIDE(OP): Performed by: NURSE PRACTITIONER

## 2021-06-20 PROCEDURE — 51798 US URINE CAPACITY MEASURE: CPT

## 2021-06-20 PROCEDURE — 700102 HCHG RX REV CODE 250 W/ 637 OVERRIDE(OP): Performed by: STUDENT IN AN ORGANIZED HEALTH CARE EDUCATION/TRAINING PROGRAM

## 2021-06-20 PROCEDURE — 700111 HCHG RX REV CODE 636 W/ 250 OVERRIDE (IP): Performed by: NURSE PRACTITIONER

## 2021-06-20 PROCEDURE — 85025 COMPLETE CBC W/AUTO DIFF WBC: CPT

## 2021-06-20 PROCEDURE — 80048 BASIC METABOLIC PNL TOTAL CA: CPT

## 2021-06-20 PROCEDURE — 36415 COLL VENOUS BLD VENIPUNCTURE: CPT

## 2021-06-20 RX ORDER — HYDROMORPHONE HYDROCHLORIDE 2 MG/1
2-4 TABLET ORAL
Status: DISCONTINUED | OUTPATIENT
Start: 2021-06-20 | End: 2021-06-28

## 2021-06-20 RX ORDER — FAMOTIDINE 20 MG/1
20 TABLET, FILM COATED ORAL 2 TIMES DAILY
Status: DISCONTINUED | OUTPATIENT
Start: 2021-06-20 | End: 2021-07-14 | Stop reason: HOSPADM

## 2021-06-20 RX ADMIN — MORPHINE SULFATE 30 MG: 30 TABLET, FILM COATED, EXTENDED RELEASE ORAL at 22:42

## 2021-06-20 RX ADMIN — FUROSEMIDE 20 MG: 20 TABLET ORAL at 04:25

## 2021-06-20 RX ADMIN — MORPHINE SULFATE 30 MG: 30 TABLET, FILM COATED, EXTENDED RELEASE ORAL at 11:28

## 2021-06-20 RX ADMIN — HYDROMORPHONE HYDROCHLORIDE 2 MG: 2 TABLET ORAL at 20:09

## 2021-06-20 RX ADMIN — GABAPENTIN 300 MG: 300 CAPSULE ORAL at 11:28

## 2021-06-20 RX ADMIN — HYDROMORPHONE HYDROCHLORIDE 4 MG: 2 TABLET ORAL at 07:47

## 2021-06-20 RX ADMIN — RIVAROXABAN 15 MG: 15 TABLET, FILM COATED ORAL at 11:28

## 2021-06-20 RX ADMIN — METAXALONE 800 MG: 800 TABLET ORAL at 11:28

## 2021-06-20 RX ADMIN — HEPARIN SODIUM 30 UNITS/KG/HR: 5000 INJECTION, SOLUTION INTRAVENOUS at 07:14

## 2021-06-20 RX ADMIN — GABAPENTIN 300 MG: 300 CAPSULE ORAL at 04:25

## 2021-06-20 RX ADMIN — METAXALONE 800 MG: 800 TABLET ORAL at 17:09

## 2021-06-20 RX ADMIN — HYDROMORPHONE HYDROCHLORIDE 2 MG: 2 TABLET ORAL at 17:09

## 2021-06-20 RX ADMIN — HYDROMORPHONE HYDROCHLORIDE 2 MG: 2 TABLET ORAL at 01:35

## 2021-06-20 RX ADMIN — FAMOTIDINE 20 MG: 20 TABLET ORAL at 17:09

## 2021-06-20 RX ADMIN — HYDROMORPHONE HYDROCHLORIDE 4 MG: 2 TABLET ORAL at 23:24

## 2021-06-20 RX ADMIN — HEPARIN SODIUM 2900 UNITS: 1000 INJECTION, SOLUTION INTRAVENOUS; SUBCUTANEOUS at 07:17

## 2021-06-20 RX ADMIN — TAMSULOSIN HYDROCHLORIDE 0.4 MG: 0.4 CAPSULE ORAL at 04:25

## 2021-06-20 RX ADMIN — ENOXAPARIN SODIUM 80 MG: 80 INJECTION SUBCUTANEOUS at 22:42

## 2021-06-20 RX ADMIN — GABAPENTIN 300 MG: 300 CAPSULE ORAL at 17:08

## 2021-06-20 RX ADMIN — METAXALONE 800 MG: 800 TABLET ORAL at 04:25

## 2021-06-20 RX ADMIN — HYDROMORPHONE HYDROCHLORIDE 4 MG: 2 TABLET ORAL at 03:02

## 2021-06-20 ASSESSMENT — ENCOUNTER SYMPTOMS
MYALGIAS: 1
GASTROINTESTINAL NEGATIVE: 1
CONSTIPATION: 0
BACK PAIN: 1
CARDIOVASCULAR NEGATIVE: 1
FEVER: 0
NEUROLOGICAL NEGATIVE: 1
CONSTITUTIONAL NEGATIVE: 1
CHILLS: 0
VOMITING: 0
DIARRHEA: 0
EYES NEGATIVE: 1
ABDOMINAL PAIN: 0
ROS GI COMMENTS: LAST BM PTA
PSYCHIATRIC NEGATIVE: 1
RESPIRATORY NEGATIVE: 1

## 2021-06-20 ASSESSMENT — PAIN DESCRIPTION - PAIN TYPE
TYPE: ACUTE PAIN;SURGICAL PAIN
TYPE: ACUTE PAIN;SURGICAL PAIN
TYPE: SURGICAL PAIN;ACUTE PAIN
TYPE: SURGICAL PAIN
TYPE: ACUTE PAIN;SURGICAL PAIN
TYPE: ACUTE PAIN;SURGICAL PAIN
TYPE: SURGICAL PAIN

## 2021-06-20 NOTE — PROGRESS NOTES
Orthopedic surgery planning ORIF distal radius and tibial plateau 6/22.    - DC Xarelto.   - Therapeutic Lovenox dosing.   - Discussed with Juanjo Logan, Orthopedic Surgery.  - Counseled

## 2021-06-20 NOTE — CARE PLAN
Problem: Pain - Standard  Goal: Alleviation of pain or a reduction in pain to the patient’s comfort goal  Outcome: Progressing     Problem: Skin Integrity  Goal: Skin integrity is maintained or improved  Outcome: Progressing   The patient is Stable - Low risk of patient condition declining or worsening    Shift Goals  Clinical Goals: pain control, mobilization  Patient Goals: pain control  Family Goals: n/a    Progress made toward(s) clinical / shift goals:  PRN medication for pain, Q2H turns, non-pharmacological interventions    Patient is not progressing towards the following goals:

## 2021-06-20 NOTE — DISCHARGE PLANNING
Anticipated Discharge Disposition: SNF    Action: This RNCM spoke with patient at bedside and obtained SNF choice: 1) [facility]. Confirmed patient lives in [place] and [facility]    Barriers to Discharge: SNF acceptance, medical clearance    Plan: [facility] will continue to assist with dc planning.

## 2021-06-20 NOTE — PROGRESS NOTES
"Orthopaedic Progress Note    Author: Juanjo Logan P.A.-C. Date & Time created: 6/20/2021   3:43 PM     Interval Events:  Patient doing well  Dressings CDI  Pending L plateau and R wrist ORIF tuesday    Review of Systems   Constitutional: Negative.    Cardiovascular: Chest pain: rib fx    Gastrointestinal: Negative.    Musculoskeletal:        Pain well controlled    Neurological: Negative.      Hemodynamics:  /69   Pulse 88   Temp 36.6 °C (97.8 °F) (Temporal)   Resp 17   Ht 1.753 m (5' 9.02\")   Wt 72.4 kg (159 lb 9.8 oz)   SpO2 94%      No Active Precaution Orders    Respiratory:    Respiration: 17, Pulse Oximetry: 94 %     Work Of Breathing / Effort: Within Normal Limits  RUL Breath Sounds: Clear, RML Breath Sounds: Clear, RLL Breath Sounds: Diminished, ROD Breath Sounds: Clear, LLL Breath Sounds: Diminished    Physical Exam   HENT:   Head: Normocephalic and atraumatic.   Pulmonary/Chest: He exhibits tenderness (rib fx ).   Musculoskeletal:      Comments: RUE splint CDI, DNVI, moves all fingers, cap refill <2 sec. RLE and pelvic dressing CDI, DNVI, cap refill <2 sec.      Labs:  Recent Labs     06/19/21  0850 06/19/21  1407 06/20/21  0215   WBC 13.3* 12.5* 13.6*   RBC 2.44* 2.48* 2.71*   HEMOGLOBIN 7.2* 7.3* 8.0*   HEMATOCRIT 21.8* 21.9* 24.6*   MCV 89.3 88.3 90.8   MCH 29.5 29.4 29.5   MCHC 33.0* 33.3* 32.5*   RDW 44.7 45.1 47.4   PLATELETCT 126* 130* 170   MPV 11.4 10.7 10.8     Recent Labs     06/18/21  1445 06/19/21  0850 06/20/21  0215   SODIUM 128* 133* 131*   POTASSIUM 4.1 3.9 4.0   CHLORIDE 96 99 99   CO2 22 25 23   GLUCOSE 111* 131* 127*   BUN 16 17 15   CREATININE 0.79 0.67 0.60   CALCIUM 7.9* 7.9* 8.0*       Medical Decision Making/Problem List:    Active Hospital Problems    Diagnosis    • Hypotension [I95.9]    • Status post cardiac surgery [Z98.890]    • Respiratory failure following trauma ([facility]) [J96.90]    • Closed fracture of right femur ([facility]) [S72.91XA]    • Closed fracture of " distal end of right radius [S52.501A]    • Screening examination for infectious disease [Z11.9]    • Contraindication to deep vein thrombosis (DVT) prophylaxis [Z53.09]    • Eyelid laceration, right, initial encounter [S01.111A]    • Wedge fracture of lumbar vertebra (HCC) [S32.000A]    • 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  Clearance for lovenox/heparin: per trauma   Weight Bearing Status: NWB RUE and RLE  Wounds & Drains: dressings changed every other day by nursing  Disposition and Follow-up: per therapy recs

## 2021-06-20 NOTE — CARE PLAN
The patient is Stable - Low risk of patient condition declining or worsening    Shift Goals  Clinical Goals: pain control, mobilization  Patient Goals: pain control  Family Goals: n/a    Progress made toward(s) clinical / shift goals:  PRN pain medication given as per MD's order; turned and repositioned every 2 hours.      Patient is not progressing towards the following goals:      Problem: Pain - Standard  Goal: Alleviation of pain or a reduction in pain to the patient’s comfort goal  Outcome: Progressing     Problem: Fall Risk  Goal: Patient will remain free from falls  Outcome: Progressing
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
## 2021-06-20 NOTE — PROGRESS NOTES
Bladder scan >999ml, Hightower insterted, 10cc balloon filled with stat lock in place. ++ Urine return noted

## 2021-06-20 NOTE — PROGRESS NOTES
"Patient seen and examined    /69   Pulse 88   Temp 36.6 °C (97.8 °F) (Temporal)   Resp 17   Ht 1.753 m (5' 9.02\")   Wt 72.4 kg (159 lb 9.8 oz)   SpO2 94%     Recent Labs     06/19/21  0850 06/19/21  1407 06/20/21  0215   WBC 13.3* 12.5* 13.6*   RBC 2.44* 2.48* 2.71*   HEMOGLOBIN 7.2* 7.3* 8.0*   HEMATOCRIT 21.8* 21.9* 24.6*   MCV 89.3 88.3 90.8   MCH 29.5 29.4 29.5   MCHC 33.0* 33.3* 32.5*   RDW 44.7 45.1 47.4   PLATELETCT 126* 130* 170   MPV 11.4 10.7 10.8       No acute distress  Dressing clean dry and intact  Neurovascularly intact    POD#5  S/P IMN Right femur, Left SI screw, Right AIIS screw    Plan:  DVT Prophylaxis- TEDS/SCDs, xarelto  Weight Bearing Status-NWB RLE, RUE  PT/OT  Plan ORIF distal radius and tibial plateau 6/22  Case Coordination          "

## 2021-06-20 NOTE — DISCHARGE PLANNING
Received Choice form at 1213  Agency/Facility Name: [facility]  Referral sent per Choice form @ 7515

## 2021-06-20 NOTE — PROGRESS NOTES
Lab contacted regarding pending Heparin Xa from 0200, transferred to coagulation, [facility] being short staffed and results are still pending. No need to re-draw, awaiting results.

## 2021-06-21 PROBLEM — S82.141A CLOSED FRACTURE OF RIGHT TIBIAL PLATEAU: Status: ACTIVE | Noted: 2021-06-21

## 2021-06-21 LAB
ANION GAP SERPL CALC-SCNC: 10 MMOL/L (ref 7–16)
BASOPHILS # BLD AUTO: 0.9 % (ref 0–1.8)
BASOPHILS # BLD: 0.14 K/UL (ref 0–0.12)
BUN SERPL-MCNC: 14 MG/DL (ref 8–22)
CALCIUM SERPL-MCNC: 8 MG/DL (ref 8.5–10.5)
CHLORIDE SERPL-SCNC: 101 MMOL/L (ref 96–112)
CO2 SERPL-SCNC: 22 MMOL/L (ref 20–33)
CREAT SERPL-MCNC: 0.63 MG/DL (ref 0.5–1.4)
EOSINOPHIL # BLD AUTO: 0.17 K/UL (ref 0–0.51)
EOSINOPHIL NFR BLD: 1 % (ref 0–6.9)
ERYTHROCYTE [DISTWIDTH] IN BLOOD BY AUTOMATED COUNT: 49 FL (ref 35.9–50)
GLUCOSE SERPL-MCNC: 152 MG/DL (ref 65–99)
HCT VFR BLD AUTO: 29.6 % (ref 42–52)
HGB BLD-MCNC: 9.4 G/DL (ref 14–18)
IMM GRANULOCYTES # BLD AUTO: 0.53 K/UL (ref 0–0.11)
IMM GRANULOCYTES NFR BLD AUTO: 3.2 % (ref 0–0.9)
LYMPHOCYTES # BLD AUTO: 1.28 K/UL (ref 1–4.8)
LYMPHOCYTES NFR BLD: 7.8 % (ref 22–41)
MCH RBC QN AUTO: 29.3 PG (ref 27–33)
MCHC RBC AUTO-ENTMCNC: 31.8 G/DL (ref 33.7–35.3)
MCV RBC AUTO: 92.2 FL (ref 81.4–97.8)
MONOCYTES # BLD AUTO: 0.67 K/UL (ref 0–0.85)
MONOCYTES NFR BLD AUTO: 4.1 % (ref 0–13.4)
NEUTROPHILS # BLD AUTO: 13.52 K/UL (ref 1.82–7.42)
NEUTROPHILS NFR BLD: 83 % (ref 44–72)
NRBC # BLD AUTO: 0.04 K/UL
NRBC BLD-RTO: 0.2 /100 WBC
PLATELET # BLD AUTO: 259 K/UL (ref 164–446)
PMV BLD AUTO: 10.6 FL (ref 9–12.9)
POTASSIUM SERPL-SCNC: 4.1 MMOL/L (ref 3.6–5.5)
RBC # BLD AUTO: 3.21 M/UL (ref 4.7–6.1)
SODIUM SERPL-SCNC: 133 MMOL/L (ref 135–145)
WBC # BLD AUTO: 16.3 K/UL (ref 4.8–10.8)

## 2021-06-21 PROCEDURE — 85025 COMPLETE CBC W/AUTO DIFF WBC: CPT

## 2021-06-21 PROCEDURE — A9270 NON-COVERED ITEM OR SERVICE: HCPCS | Performed by: STUDENT IN AN ORGANIZED HEALTH CARE EDUCATION/TRAINING PROGRAM

## 2021-06-21 PROCEDURE — A9270 NON-COVERED ITEM OR SERVICE: HCPCS | Performed by: NURSE PRACTITIONER

## 2021-06-21 PROCEDURE — A9270 NON-COVERED ITEM OR SERVICE: HCPCS | Performed by: SURGERY

## 2021-06-21 PROCEDURE — 36415 COLL VENOUS BLD VENIPUNCTURE: CPT

## 2021-06-21 PROCEDURE — 700102 HCHG RX REV CODE 250 W/ 637 OVERRIDE(OP): Performed by: NURSE PRACTITIONER

## 2021-06-21 PROCEDURE — 700102 HCHG RX REV CODE 250 W/ 637 OVERRIDE(OP): Performed by: STUDENT IN AN ORGANIZED HEALTH CARE EDUCATION/TRAINING PROGRAM

## 2021-06-21 PROCEDURE — 700102 HCHG RX REV CODE 250 W/ 637 OVERRIDE(OP): Performed by: SURGERY

## 2021-06-21 PROCEDURE — 80048 BASIC METABOLIC PNL TOTAL CA: CPT

## 2021-06-21 PROCEDURE — 700111 HCHG RX REV CODE 636 W/ 250 OVERRIDE (IP): Performed by: NURSE PRACTITIONER

## 2021-06-21 PROCEDURE — 770006 HCHG ROOM/CARE - MED/SURG/GYN SEMI*

## 2021-06-21 RX ADMIN — METAXALONE 800 MG: 800 TABLET ORAL at 12:39

## 2021-06-21 RX ADMIN — GABAPENTIN 300 MG: 300 CAPSULE ORAL at 06:08

## 2021-06-21 RX ADMIN — HYDROMORPHONE HYDROCHLORIDE 4 MG: 2 TABLET ORAL at 06:08

## 2021-06-21 RX ADMIN — GABAPENTIN 300 MG: 300 CAPSULE ORAL at 12:39

## 2021-06-21 RX ADMIN — HYDROMORPHONE HYDROCHLORIDE 2 MG: 2 TABLET ORAL at 02:54

## 2021-06-21 RX ADMIN — FUROSEMIDE 20 MG: 20 TABLET ORAL at 06:08

## 2021-06-21 RX ADMIN — FAMOTIDINE 20 MG: 20 TABLET ORAL at 17:38

## 2021-06-21 RX ADMIN — MORPHINE SULFATE 30 MG: 30 TABLET, FILM COATED, EXTENDED RELEASE ORAL at 23:40

## 2021-06-21 RX ADMIN — TAMSULOSIN HYDROCHLORIDE 0.4 MG: 0.4 CAPSULE ORAL at 06:08

## 2021-06-21 RX ADMIN — METAXALONE 800 MG: 800 TABLET ORAL at 06:08

## 2021-06-21 RX ADMIN — MORPHINE SULFATE 30 MG: 30 TABLET, FILM COATED, EXTENDED RELEASE ORAL at 12:39

## 2021-06-21 RX ADMIN — GABAPENTIN 300 MG: 300 CAPSULE ORAL at 17:38

## 2021-06-21 RX ADMIN — ENOXAPARIN SODIUM 80 MG: 80 INJECTION SUBCUTANEOUS at 06:08

## 2021-06-21 RX ADMIN — HYDROMORPHONE HYDROCHLORIDE 2 MG: 2 TABLET ORAL at 20:29

## 2021-06-21 RX ADMIN — DOCUSATE SODIUM 50 MG AND SENNOSIDES 8.6 MG 1 TABLET: 8.6; 5 TABLET, FILM COATED ORAL at 20:29

## 2021-06-21 RX ADMIN — METAXALONE 800 MG: 800 TABLET ORAL at 17:37

## 2021-06-21 RX ADMIN — FAMOTIDINE 20 MG: 20 TABLET ORAL at 06:08

## 2021-06-21 ASSESSMENT — ENCOUNTER SYMPTOMS
ABDOMINAL PAIN: 0
CARDIOVASCULAR NEGATIVE: 1
VOMITING: 0
NEUROLOGICAL NEGATIVE: 1
CONSTITUTIONAL NEGATIVE: 1
DIARRHEA: 0
BACK PAIN: 1
RESPIRATORY NEGATIVE: 1
EYES NEGATIVE: 1
GASTROINTESTINAL NEGATIVE: 1
MYALGIAS: 1
FEVER: 0
PSYCHIATRIC NEGATIVE: 1
CONSTIPATION: 0
CHILLS: 0

## 2021-06-21 ASSESSMENT — PAIN DESCRIPTION - PAIN TYPE
TYPE: ACUTE PAIN;SURGICAL PAIN

## 2021-06-21 NOTE — ASSESSMENT & PLAN NOTE
Imaging with comminuted proximal tibial metaphyseal fracture extending into the joint space.  6/22 ORIF tibial plateau.  6/29 Staples removed.  Weight bearing status - Nonweightbearing RLE. In knee immobilizer when OOB.  Jamil Odonnell MD. Orthopedic Surgeon. [facility].

## 2021-06-21 NOTE — PROGRESS NOTES
"Orthopaedic Progress Note    Author: Juanjo Logan P.A.-C. Date & Time created: 6/21/2021   3:30 PM     Interval Events:  Patient doing well  Dressings CDI  Pending L plateau and R wrist ORIF tomorrow  NPO after midnight    Review of Systems   Constitutional: Negative.    Cardiovascular: Chest pain: rib fx    Gastrointestinal: Negative.    Musculoskeletal:        Pain well controlled    Neurological: Negative.      Hemodynamics:  /61   Pulse 86   Temp 36.2 °C (97.2 °F) (Temporal)   Resp 17   Ht 1.753 m (5' 9.02\")   Wt 72.4 kg (159 lb 9.8 oz)   SpO2 95%      No Active Precaution Orders    Respiratory:    Respiration: 17, Pulse Oximetry: 95 %     Work Of Breathing / Effort: Within Normal Limits  RUL Breath Sounds: Clear, RML Breath Sounds: Clear, RLL Breath Sounds: Diminished, ROD Breath Sounds: Clear, LLL Breath Sounds: Diminished    Physical Exam   HENT:   Head: Normocephalic and atraumatic.   Pulmonary/Chest: He exhibits tenderness (rib fx ).   Musculoskeletal:      Comments: RUE splint CDI, DNVI, moves all fingers, cap refill <2 sec. RLE and pelvic dressing CDI, DNVI, cap refill <2 sec.      Labs:  Recent Labs     06/19/21  1407 06/20/21  0215 06/21/21  1306   WBC 12.5* 13.6* 16.3*   RBC 2.48* 2.71* 3.21*   HEMOGLOBIN 7.3* 8.0* 9.4*   HEMATOCRIT 21.9* 24.6* 29.6*   MCV 88.3 90.8 92.2   MCH 29.4 29.5 29.3   MCHC 33.3* 32.5* 31.8*   RDW 45.1 47.4 49.0   PLATELETCT 130* 170 259   MPV 10.7 10.8 10.6     Recent Labs     06/19/21  0850 06/20/21  0215 06/21/21  1306   SODIUM 133* 131* 133*   POTASSIUM 3.9 4.0 4.1   CHLORIDE 99 99 101   CO2 25 23 22   GLUCOSE 131* 127* 152*   BUN 17 15 14   CREATININE 0.67 0.60 0.63   CALCIUM 7.9* 8.0* 8.0*       Medical Decision Making/Problem List:    Active Hospital Problems    Diagnosis    • Hypotension [I95.9]    • Status post cardiac surgery [Z98.890]    • Respiratory failure following trauma ([facility]) [J96.90]    • Closed fracture of right femur ([facility]) [S72.91XA]    • " Closed fracture of distal end of right radius [S52.501A]    • Screening examination for infectious disease [Z11.9]    • Contraindication to deep vein thrombosis (DVT) prophylaxis [Z53.09]    • Eyelid laceration, right, initial encounter [S01.111A]    • Wedge fracture of lumbar vertebra (HCC) [S32.000A]    • 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  Clearance for lovenox/heparin: per trauma   Weight Bearing Status: NWB RUE and RLE  Wounds & Drains: dressings changed every other day by nursing  Disposition and Follow-up: per therapy recs
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
## 2021-06-22 ENCOUNTER — APPOINTMENT (OUTPATIENT)
Dept: RADIOLOGY | Facility: MEDICAL CENTER | Age: 56
DRG: 956 | End: 2021-06-22
Attending: NURSE PRACTITIONER
Payer: COMMERCIAL

## 2021-06-22 ENCOUNTER — APPOINTMENT (OUTPATIENT)
Dept: RADIOLOGY | Facility: MEDICAL CENTER | Age: 56
DRG: 956 | End: 2021-06-22
Attending: ORTHOPAEDIC SURGERY
Payer: COMMERCIAL

## 2021-06-22 ENCOUNTER — ANESTHESIA EVENT (OUTPATIENT)
Dept: SURGERY | Facility: MEDICAL CENTER | Age: 56
DRG: 956 | End: 2021-06-22
Payer: COMMERCIAL

## 2021-06-22 ENCOUNTER — ANESTHESIA (OUTPATIENT)
Dept: SURGERY | Facility: MEDICAL CENTER | Age: 56
DRG: 956 | End: 2021-06-22
Payer: COMMERCIAL

## 2021-06-22 PROBLEM — D72.829 LEUKOCYTOSIS: Status: ACTIVE | Noted: 2021-06-22

## 2021-06-22 PROBLEM — S43.101A AC SEPARATION, RIGHT, INITIAL ENCOUNTER: Status: ACTIVE | Noted: 2021-06-22

## 2021-06-22 LAB
APPEARANCE UR: CLEAR
BACTERIA #/AREA URNS HPF: NEGATIVE /HPF
BASOPHILS # BLD AUTO: 0.4 % (ref 0–1.8)
BASOPHILS # BLD: 0.07 K/UL (ref 0–0.12)
BILIRUB UR QL STRIP.AUTO: NEGATIVE
COLOR UR: YELLOW
EOSINOPHIL # BLD AUTO: 0.25 K/UL (ref 0–0.51)
EOSINOPHIL NFR BLD: 1.3 % (ref 0–6.9)
EPI CELLS #/AREA URNS HPF: NEGATIVE /HPF
ERYTHROCYTE [DISTWIDTH] IN BLOOD BY AUTOMATED COUNT: 47.5 FL (ref 35.9–50)
GLUCOSE UR STRIP.AUTO-MCNC: NEGATIVE MG/DL
HCT VFR BLD AUTO: 25.3 % (ref 42–52)
HGB BLD-MCNC: 8.1 G/DL (ref 14–18)
HYALINE CASTS #/AREA URNS LPF: ABNORMAL /LPF
IMM GRANULOCYTES # BLD AUTO: 0.79 K/UL (ref 0–0.11)
IMM GRANULOCYTES NFR BLD AUTO: 4.1 % (ref 0–0.9)
KETONES UR STRIP.AUTO-MCNC: NEGATIVE MG/DL
LEUKOCYTE ESTERASE UR QL STRIP.AUTO: ABNORMAL
LYMPHOCYTES # BLD AUTO: 1.47 K/UL (ref 1–4.8)
LYMPHOCYTES NFR BLD: 7.6 % (ref 22–41)
MCH RBC QN AUTO: 29.2 PG (ref 27–33)
MCHC RBC AUTO-ENTMCNC: 32 G/DL (ref 33.7–35.3)
MCV RBC AUTO: 91.3 FL (ref 81.4–97.8)
MICRO URNS: ABNORMAL
MONOCYTES # BLD AUTO: 1.08 K/UL (ref 0–0.85)
MONOCYTES NFR BLD AUTO: 5.6 % (ref 0–13.4)
NEUTROPHILS # BLD AUTO: 15.64 K/UL (ref 1.82–7.42)
NEUTROPHILS NFR BLD: 81 % (ref 44–72)
NITRITE UR QL STRIP.AUTO: NEGATIVE
NRBC # BLD AUTO: 0.03 K/UL
NRBC BLD-RTO: 0.2 /100 WBC
PH UR STRIP.AUTO: 7.5 [PH] (ref 5–8)
PLATELET # BLD AUTO: 296 K/UL (ref 164–446)
PMV BLD AUTO: 10.4 FL (ref 9–12.9)
PROT UR QL STRIP: NEGATIVE MG/DL
RBC # BLD AUTO: 2.77 M/UL (ref 4.7–6.1)
RBC # URNS HPF: ABNORMAL /HPF
RBC UR QL AUTO: NEGATIVE
SP GR UR STRIP.AUTO: 1.01
UROBILINOGEN UR STRIP.AUTO-MCNC: 1 MG/DL
WBC # BLD AUTO: 19.3 K/UL (ref 4.8–10.8)
WBC #/AREA URNS HPF: ABNORMAL /HPF

## 2021-06-22 PROCEDURE — 71045 X-RAY EXAM CHEST 1 VIEW: CPT

## 2021-06-22 PROCEDURE — 73590 X-RAY EXAM OF LOWER LEG: CPT | Mod: RT

## 2021-06-22 PROCEDURE — 81001 URINALYSIS AUTO W/SCOPE: CPT

## 2021-06-22 PROCEDURE — A9270 NON-COVERED ITEM OR SERVICE: HCPCS | Performed by: ANESTHESIOLOGY

## 2021-06-22 PROCEDURE — A9270 NON-COVERED ITEM OR SERVICE: HCPCS | Performed by: STUDENT IN AN ORGANIZED HEALTH CARE EDUCATION/TRAINING PROGRAM

## 2021-06-22 PROCEDURE — 160041 HCHG SURGERY MINUTES - EA ADDL 1 MIN LEVEL 4: Performed by: ORTHOPAEDIC SURGERY

## 2021-06-22 PROCEDURE — 0QSG04Z REPOSITION RIGHT TIBIA WITH INTERNAL FIXATION DEVICE, OPEN APPROACH: ICD-10-PCS | Performed by: ORTHOPAEDIC SURGERY

## 2021-06-22 PROCEDURE — 160009 HCHG ANES TIME/MIN: Performed by: ORTHOPAEDIC SURGERY

## 2021-06-22 PROCEDURE — 700102 HCHG RX REV CODE 250 W/ 637 OVERRIDE(OP): Performed by: SURGERY

## 2021-06-22 PROCEDURE — 700101 HCHG RX REV CODE 250: Performed by: ORTHOPAEDIC SURGERY

## 2021-06-22 PROCEDURE — 700111 HCHG RX REV CODE 636 W/ 250 OVERRIDE (IP): Performed by: NURSE PRACTITIONER

## 2021-06-22 PROCEDURE — 160035 HCHG PACU - 1ST 60 MINS PHASE I: Performed by: ORTHOPAEDIC SURGERY

## 2021-06-22 PROCEDURE — 73100 X-RAY EXAM OF WRIST: CPT | Mod: RT

## 2021-06-22 PROCEDURE — 700111 HCHG RX REV CODE 636 W/ 250 OVERRIDE (IP): Performed by: SURGERY

## 2021-06-22 PROCEDURE — 700102 HCHG RX REV CODE 250 W/ 637 OVERRIDE(OP): Performed by: NURSE PRACTITIONER

## 2021-06-22 PROCEDURE — 25607 OPTX DST RD XARTC FX/EPI SEP: CPT | Mod: 58,59 | Performed by: ORTHOPAEDIC SURGERY

## 2021-06-22 PROCEDURE — 700105 HCHG RX REV CODE 258: Performed by: NURSE PRACTITIONER

## 2021-06-22 PROCEDURE — 700102 HCHG RX REV CODE 250 W/ 637 OVERRIDE(OP): Performed by: ANESTHESIOLOGY

## 2021-06-22 PROCEDURE — 700105 HCHG RX REV CODE 258: Performed by: ANESTHESIOLOGY

## 2021-06-22 PROCEDURE — A9270 NON-COVERED ITEM OR SERVICE: HCPCS | Performed by: NURSE PRACTITIONER

## 2021-06-22 PROCEDURE — 700111 HCHG RX REV CODE 636 W/ 250 OVERRIDE (IP): Performed by: ANESTHESIOLOGY

## 2021-06-22 PROCEDURE — 85025 COMPLETE CBC W/AUTO DIFF WBC: CPT

## 2021-06-22 PROCEDURE — 700102 HCHG RX REV CODE 250 W/ 637 OVERRIDE(OP): Performed by: STUDENT IN AN ORGANIZED HEALTH CARE EDUCATION/TRAINING PROGRAM

## 2021-06-22 PROCEDURE — 700111 HCHG RX REV CODE 636 W/ 250 OVERRIDE (IP): Performed by: ORTHOPAEDIC SURGERY

## 2021-06-22 PROCEDURE — C1713 ANCHOR/SCREW BN/BN,TIS/BN: HCPCS | Performed by: ORTHOPAEDIC SURGERY

## 2021-06-22 PROCEDURE — 27536 TREAT KNEE FRACTURE: CPT | Mod: 58,RT | Performed by: ORTHOPAEDIC SURGERY

## 2021-06-22 PROCEDURE — 87040 BLOOD CULTURE FOR BACTERIA: CPT

## 2021-06-22 PROCEDURE — 160029 HCHG SURGERY MINUTES - 1ST 30 MINS LEVEL 4: Performed by: ORTHOPAEDIC SURGERY

## 2021-06-22 PROCEDURE — 500881 HCHG PACK, EXTREMITY: Performed by: ORTHOPAEDIC SURGERY

## 2021-06-22 PROCEDURE — 160002 HCHG RECOVERY MINUTES (STAT): Performed by: ORTHOPAEDIC SURGERY

## 2021-06-22 PROCEDURE — 700101 HCHG RX REV CODE 250: Performed by: ANESTHESIOLOGY

## 2021-06-22 PROCEDURE — 700101 HCHG RX REV CODE 250: Performed by: NURSE PRACTITIONER

## 2021-06-22 PROCEDURE — 770006 HCHG ROOM/CARE - MED/SURG/GYN SEMI*

## 2021-06-22 PROCEDURE — 0PUH0KZ SUPPLEMENT RIGHT RADIUS WITH NONAUTOLOGOUS TISSUE SUBSTITUTE, OPEN APPROACH: ICD-10-PCS | Performed by: ORTHOPAEDIC SURGERY

## 2021-06-22 PROCEDURE — 36415 COLL VENOUS BLD VENIPUNCTURE: CPT

## 2021-06-22 PROCEDURE — A6454 SELF-ADHER BAND W>=3" <5"/YD: HCPCS | Performed by: ORTHOPAEDIC SURGERY

## 2021-06-22 PROCEDURE — 160048 HCHG OR STATISTICAL LEVEL 1-5: Performed by: ORTHOPAEDIC SURGERY

## 2021-06-22 PROCEDURE — 160036 HCHG PACU - EA ADDL 30 MINS PHASE I: Performed by: ORTHOPAEDIC SURGERY

## 2021-06-22 PROCEDURE — 20902 REMOVAL OF BONE FOR GRAFT: CPT | Mod: 58,59 | Performed by: ORTHOPAEDIC SURGERY

## 2021-06-22 PROCEDURE — 501838 HCHG SUTURE GENERAL: Performed by: ORTHOPAEDIC SURGERY

## 2021-06-22 PROCEDURE — A9270 NON-COVERED ITEM OR SERVICE: HCPCS | Performed by: SURGERY

## 2021-06-22 PROCEDURE — 0PSH04Z REPOSITION RIGHT RADIUS WITH INTERNAL FIXATION DEVICE, OPEN APPROACH: ICD-10-PCS | Performed by: ORTHOPAEDIC SURGERY

## 2021-06-22 PROCEDURE — 502000 HCHG MISC OR IMPLANTS RC 0278: Performed by: ORTHOPAEDIC SURGERY

## 2021-06-22 DEVICE — SCREW 2.5 MM LOCKING TI X 16MM LONG (6TX8=48): Type: IMPLANTABLE DEVICE | Site: ULNA | Status: FUNCTIONAL

## 2021-06-22 DEVICE — SCREW 2.5 MM LOCKING TI X 18MM LONG (6TX8=48): Type: IMPLANTABLE DEVICE | Site: ULNA | Status: FUNCTIONAL

## 2021-06-22 DEVICE — BONE CHIPS 15CC FREEZE DRIED CANCELLOUS CRUSHED: Type: IMPLANTABLE DEVICE | Site: TIBIA | Status: FUNCTIONAL

## 2021-06-22 DEVICE — SCREW 2.5 MM LOCKING TI X 20MM LONG (6TX8=48): Type: IMPLANTABLE DEVICE | Site: ULNA | Status: FUNCTIONAL

## 2021-06-22 DEVICE — IMPLANTABLE DEVICE: Type: IMPLANTABLE DEVICE | Site: ULNA | Status: FUNCTIONAL

## 2021-06-22 DEVICE — SCREW 3.5 MM NON-LOCKING TI X 40MM LONG (6TX8=48): Type: IMPLANTABLE DEVICE | Site: TIBIA | Status: FUNCTIONAL

## 2021-06-22 DEVICE — WIRE 1.6MMX150MM K-WIRE (10 PACK) (8TX10=80): Type: IMPLANTABLE DEVICE | Site: ULNA | Status: FUNCTIONAL

## 2021-06-22 DEVICE — SCREW 3.5 MM NON-LOCKING TI X 14MM LONG (6TX8+2TX5=58): Type: IMPLANTABLE DEVICE | Site: ULNA | Status: FUNCTIONAL

## 2021-06-22 DEVICE — SCREW 3.5 MM NON-LOCKING TI X 36MM LONG (6TX8=48): Type: IMPLANTABLE DEVICE | Site: TIBIA | Status: FUNCTIONAL

## 2021-06-22 DEVICE — SCREW 3.5 MM LOCKING TI X 30MM LONG (6TX8=48): Type: IMPLANTABLE DEVICE | Site: TIBIA | Status: FUNCTIONAL

## 2021-06-22 DEVICE — SCREW 3.5 MM LOCKING TI X 80MM LONG (6TX4=24): Type: IMPLANTABLE DEVICE | Site: TIBIA | Status: FUNCTIONAL

## 2021-06-22 DEVICE — IMPLANTABLE DEVICE: Type: IMPLANTABLE DEVICE | Site: TIBIA | Status: FUNCTIONAL

## 2021-06-22 DEVICE — SCREW 3.5 MM LOCKING TI X 65MM LONG (6TX8=48): Type: IMPLANTABLE DEVICE | Site: TIBIA | Status: FUNCTIONAL

## 2021-06-22 DEVICE — PLATE POSTERIAL MEDIAL PROXIMAL TIBIA 4H RIGHT (2TX1=2): Type: IMPLANTABLE DEVICE | Site: TIBIA | Status: FUNCTIONAL

## 2021-06-22 DEVICE — SCREW 3.5 MM NON-LOCKING TI X 30MM LONG (6TX8=48): Type: IMPLANTABLE DEVICE | Site: TIBIA | Status: FUNCTIONAL

## 2021-06-22 DEVICE — SCREW 3.5 MM LOCKING TI X 40MM LONG (6TX8=48): Type: IMPLANTABLE DEVICE | Site: ULNA | Status: FUNCTIONAL

## 2021-06-22 DEVICE — SCREW 3.5 MM LOCKING TI X 70MM LONG (6TX4=24): Type: IMPLANTABLE DEVICE | Site: TIBIA | Status: FUNCTIONAL

## 2021-06-22 DEVICE — SCREW 3.5 MM NON-LOCKING TI X 34MM LONG (6TX8=48): Type: IMPLANTABLE DEVICE | Site: TIBIA | Status: FUNCTIONAL

## 2021-06-22 RX ORDER — DEXAMETHASONE SODIUM PHOSPHATE 4 MG/ML
INJECTION, SOLUTION INTRA-ARTICULAR; INTRALESIONAL; INTRAMUSCULAR; INTRAVENOUS; SOFT TISSUE PRN
Status: DISCONTINUED | OUTPATIENT
Start: 2021-06-22 | End: 2021-06-22 | Stop reason: SURG

## 2021-06-22 RX ORDER — ONDANSETRON 2 MG/ML
4 INJECTION INTRAMUSCULAR; INTRAVENOUS
Status: DISCONTINUED | OUTPATIENT
Start: 2021-06-22 | End: 2021-06-22 | Stop reason: HOSPADM

## 2021-06-22 RX ORDER — HYDROMORPHONE HYDROCHLORIDE 1 MG/ML
0.4 INJECTION, SOLUTION INTRAMUSCULAR; INTRAVENOUS; SUBCUTANEOUS
Status: DISCONTINUED | OUTPATIENT
Start: 2021-06-22 | End: 2021-06-22 | Stop reason: HOSPADM

## 2021-06-22 RX ORDER — LABETALOL HYDROCHLORIDE 5 MG/ML
5 INJECTION, SOLUTION INTRAVENOUS
Status: DISCONTINUED | OUTPATIENT
Start: 2021-06-22 | End: 2021-06-22 | Stop reason: HOSPADM

## 2021-06-22 RX ORDER — DIPHENHYDRAMINE HYDROCHLORIDE 50 MG/ML
12.5 INJECTION INTRAMUSCULAR; INTRAVENOUS
Status: DISCONTINUED | OUTPATIENT
Start: 2021-06-22 | End: 2021-06-22 | Stop reason: HOSPADM

## 2021-06-22 RX ORDER — ACETAMINOPHEN 500 MG
1000 TABLET ORAL ONCE
Status: COMPLETED | OUTPATIENT
Start: 2021-06-22 | End: 2021-06-22

## 2021-06-22 RX ORDER — MEPERIDINE HYDROCHLORIDE 25 MG/ML
12.5 INJECTION INTRAMUSCULAR; INTRAVENOUS; SUBCUTANEOUS
Status: DISCONTINUED | OUTPATIENT
Start: 2021-06-22 | End: 2021-06-22 | Stop reason: HOSPADM

## 2021-06-22 RX ORDER — HYDROMORPHONE HYDROCHLORIDE 1 MG/ML
0.1 INJECTION, SOLUTION INTRAMUSCULAR; INTRAVENOUS; SUBCUTANEOUS
Status: DISCONTINUED | OUTPATIENT
Start: 2021-06-22 | End: 2021-06-22 | Stop reason: HOSPADM

## 2021-06-22 RX ORDER — MIDAZOLAM HYDROCHLORIDE 1 MG/ML
1 INJECTION INTRAMUSCULAR; INTRAVENOUS
Status: DISCONTINUED | OUTPATIENT
Start: 2021-06-22 | End: 2021-06-22 | Stop reason: HOSPADM

## 2021-06-22 RX ORDER — PHENYLEPHRINE HYDROCHLORIDE 10 MG/ML
INJECTION, SOLUTION INTRAMUSCULAR; INTRAVENOUS; SUBCUTANEOUS PRN
Status: DISCONTINUED | OUTPATIENT
Start: 2021-06-22 | End: 2021-06-22 | Stop reason: SURG

## 2021-06-22 RX ORDER — HYDROMORPHONE HYDROCHLORIDE 1 MG/ML
0.2 INJECTION, SOLUTION INTRAMUSCULAR; INTRAVENOUS; SUBCUTANEOUS
Status: DISCONTINUED | OUTPATIENT
Start: 2021-06-22 | End: 2021-06-22 | Stop reason: HOSPADM

## 2021-06-22 RX ORDER — ROCURONIUM BROMIDE 10 MG/ML
INJECTION, SOLUTION INTRAVENOUS PRN
Status: DISCONTINUED | OUTPATIENT
Start: 2021-06-22 | End: 2021-06-22 | Stop reason: SURG

## 2021-06-22 RX ORDER — MORPHINE SULFATE 0.5 MG/ML
INJECTION, SOLUTION EPIDURAL; INTRATHECAL; INTRAVENOUS
Status: DISCONTINUED | OUTPATIENT
Start: 2021-06-22 | End: 2021-06-22 | Stop reason: HOSPADM

## 2021-06-22 RX ORDER — SODIUM CHLORIDE, SODIUM LACTATE, POTASSIUM CHLORIDE, CALCIUM CHLORIDE 600; 310; 30; 20 MG/100ML; MG/100ML; MG/100ML; MG/100ML
INJECTION, SOLUTION INTRAVENOUS CONTINUOUS
Status: DISCONTINUED | OUTPATIENT
Start: 2021-06-22 | End: 2021-06-22 | Stop reason: HOSPADM

## 2021-06-22 RX ORDER — CEFAZOLIN SODIUM 1 G/3ML
INJECTION, POWDER, FOR SOLUTION INTRAMUSCULAR; INTRAVENOUS PRN
Status: DISCONTINUED | OUTPATIENT
Start: 2021-06-22 | End: 2021-06-22 | Stop reason: SURG

## 2021-06-22 RX ORDER — HYDROMORPHONE HYDROCHLORIDE 2 MG/ML
INJECTION, SOLUTION INTRAMUSCULAR; INTRAVENOUS; SUBCUTANEOUS PRN
Status: DISCONTINUED | OUTPATIENT
Start: 2021-06-22 | End: 2021-06-22 | Stop reason: SURG

## 2021-06-22 RX ORDER — GLYCOPYRROLATE 0.2 MG/ML
INJECTION INTRAMUSCULAR; INTRAVENOUS PRN
Status: DISCONTINUED | OUTPATIENT
Start: 2021-06-22 | End: 2021-06-22 | Stop reason: SURG

## 2021-06-22 RX ORDER — OXYCODONE HCL 5 MG/5 ML
5 SOLUTION, ORAL ORAL
Status: COMPLETED | OUTPATIENT
Start: 2021-06-22 | End: 2021-06-22

## 2021-06-22 RX ORDER — KETAMINE HYDROCHLORIDE 50 MG/ML
INJECTION, SOLUTION INTRAMUSCULAR; INTRAVENOUS PRN
Status: DISCONTINUED | OUTPATIENT
Start: 2021-06-22 | End: 2021-06-22 | Stop reason: SURG

## 2021-06-22 RX ORDER — SODIUM CHLORIDE 9 MG/ML
INJECTION, SOLUTION INTRAMUSCULAR; INTRAVENOUS; SUBCUTANEOUS
Status: DISCONTINUED | OUTPATIENT
Start: 2021-06-22 | End: 2021-06-22 | Stop reason: HOSPADM

## 2021-06-22 RX ORDER — HALOPERIDOL 5 MG/ML
1 INJECTION INTRAMUSCULAR
Status: DISCONTINUED | OUTPATIENT
Start: 2021-06-22 | End: 2021-06-22 | Stop reason: HOSPADM

## 2021-06-22 RX ORDER — KETOROLAC TROMETHAMINE 30 MG/ML
INJECTION, SOLUTION INTRAMUSCULAR; INTRAVENOUS
Status: DISCONTINUED | OUTPATIENT
Start: 2021-06-22 | End: 2021-06-22 | Stop reason: HOSPADM

## 2021-06-22 RX ORDER — MAGNESIUM SULFATE HEPTAHYDRATE 40 MG/ML
INJECTION, SOLUTION INTRAVENOUS PRN
Status: DISCONTINUED | OUTPATIENT
Start: 2021-06-22 | End: 2021-06-22 | Stop reason: SURG

## 2021-06-22 RX ORDER — BUPIVACAINE HYDROCHLORIDE 2.5 MG/ML
INJECTION, SOLUTION EPIDURAL; INFILTRATION; INTRACAUDAL
Status: DISCONTINUED | OUTPATIENT
Start: 2021-06-22 | End: 2021-06-22 | Stop reason: HOSPADM

## 2021-06-22 RX ORDER — NEOSTIGMINE METHYLSULFATE 1 MG/ML
INJECTION, SOLUTION INTRAVENOUS PRN
Status: DISCONTINUED | OUTPATIENT
Start: 2021-06-22 | End: 2021-06-22 | Stop reason: SURG

## 2021-06-22 RX ORDER — SODIUM CHLORIDE, SODIUM LACTATE, POTASSIUM CHLORIDE, CALCIUM CHLORIDE 600; 310; 30; 20 MG/100ML; MG/100ML; MG/100ML; MG/100ML
INJECTION, SOLUTION INTRAVENOUS
Status: DISCONTINUED | OUTPATIENT
Start: 2021-06-22 | End: 2021-06-22 | Stop reason: SURG

## 2021-06-22 RX ORDER — PROPOFOL 10 MG/ML
INJECTION, EMULSION INTRAVENOUS PRN
Status: DISCONTINUED | OUTPATIENT
Start: 2021-06-22 | End: 2021-06-22 | Stop reason: SURG

## 2021-06-22 RX ORDER — OXYCODONE HCL 5 MG/5 ML
10 SOLUTION, ORAL ORAL
Status: COMPLETED | OUTPATIENT
Start: 2021-06-22 | End: 2021-06-22

## 2021-06-22 RX ADMIN — GLYCOPYRROLATE 0.8 MG: 0.2 INJECTION INTRAMUSCULAR; INTRAVENOUS at 13:50

## 2021-06-22 RX ADMIN — METAXALONE 800 MG: 800 TABLET ORAL at 05:50

## 2021-06-22 RX ADMIN — SODIUM CHLORIDE, POTASSIUM CHLORIDE, SODIUM LACTATE AND CALCIUM CHLORIDE: 600; 310; 30; 20 INJECTION, SOLUTION INTRAVENOUS at 12:10

## 2021-06-22 RX ADMIN — ACETAMINOPHEN 1000 MG: 500 TABLET ORAL at 14:27

## 2021-06-22 RX ADMIN — FAMOTIDINE 20 MG: 20 TABLET ORAL at 17:09

## 2021-06-22 RX ADMIN — DOCUSATE SODIUM 100 MG: 100 CAPSULE ORAL at 17:09

## 2021-06-22 RX ADMIN — ALBUTEROL SULFATE 2.5 MG: 2.5 SOLUTION RESPIRATORY (INHALATION) at 14:33

## 2021-06-22 RX ADMIN — ROCURONIUM BROMIDE 20 MG: 10 INJECTION, SOLUTION INTRAVENOUS at 12:22

## 2021-06-22 RX ADMIN — VANCOMYCIN HYDROCHLORIDE 1750 MG: 500 INJECTION, POWDER, LYOPHILIZED, FOR SOLUTION INTRAVENOUS at 17:16

## 2021-06-22 RX ADMIN — CEFEPIME 2 G: 2 INJECTION, POWDER, FOR SOLUTION INTRAVENOUS at 17:09

## 2021-06-22 RX ADMIN — OXYCODONE HYDROCHLORIDE 10 MG: 5 SOLUTION ORAL at 14:27

## 2021-06-22 RX ADMIN — FAMOTIDINE 20 MG: 20 TABLET ORAL at 05:50

## 2021-06-22 RX ADMIN — HYDROMORPHONE HYDROCHLORIDE 4 MG: 2 TABLET ORAL at 20:45

## 2021-06-22 RX ADMIN — GABAPENTIN 300 MG: 300 CAPSULE ORAL at 17:09

## 2021-06-22 RX ADMIN — HYDROMORPHONE HYDROCHLORIDE 4 MG: 2 TABLET ORAL at 17:42

## 2021-06-22 RX ADMIN — MAGNESIUM SULFATE IN WATER 4 G: 40 INJECTION, SOLUTION INTRAVENOUS at 12:26

## 2021-06-22 RX ADMIN — METAXALONE 800 MG: 800 TABLET ORAL at 17:09

## 2021-06-22 RX ADMIN — KETAMINE HYDROCHLORIDE 40 MG: 50 INJECTION INTRAMUSCULAR; INTRAVENOUS at 12:32

## 2021-06-22 RX ADMIN — MORPHINE SULFATE 30 MG: 30 TABLET, FILM COATED, EXTENDED RELEASE ORAL at 23:38

## 2021-06-22 RX ADMIN — MIDAZOLAM 2 MG: 1 INJECTION INTRAMUSCULAR; INTRAVENOUS at 11:52

## 2021-06-22 RX ADMIN — HYDROMORPHONE HYDROCHLORIDE 2 MG: 2 TABLET ORAL at 05:50

## 2021-06-22 RX ADMIN — ALBUTEROL SULFATE 2.5 MG: 2.5 SOLUTION RESPIRATORY (INHALATION) at 14:47

## 2021-06-22 RX ADMIN — PROPOFOL 50 MG: 10 INJECTION, EMULSION INTRAVENOUS at 11:56

## 2021-06-22 RX ADMIN — PHENYLEPHRINE HYDROCHLORIDE 200 MCG: 10 INJECTION INTRAVENOUS at 11:56

## 2021-06-22 RX ADMIN — CEFAZOLIN 2 G: 330 INJECTION, POWDER, FOR SOLUTION INTRAMUSCULAR; INTRAVENOUS at 12:05

## 2021-06-22 RX ADMIN — DEXAMETHASONE SODIUM PHOSPHATE 4 MG: 4 INJECTION, SOLUTION INTRA-ARTICULAR; INTRALESIONAL; INTRAMUSCULAR; INTRAVENOUS; SOFT TISSUE at 12:23

## 2021-06-22 RX ADMIN — HYDROMORPHONE HYDROCHLORIDE 2 MG: 2 INJECTION, SOLUTION INTRAMUSCULAR; INTRAVENOUS; SUBCUTANEOUS at 11:55

## 2021-06-22 RX ADMIN — NEOSTIGMINE METHYLSULFATE 4 MG: 1 INJECTION INTRAVENOUS at 13:50

## 2021-06-22 RX ADMIN — POLYETHYLENE GLYCOL 3350 1 PACKET: 17 POWDER, FOR SOLUTION ORAL at 17:10

## 2021-06-22 RX ADMIN — PROPOFOL 50 MG: 10 INJECTION, EMULSION INTRAVENOUS at 11:58

## 2021-06-22 RX ADMIN — ROCURONIUM BROMIDE 30 MG: 10 INJECTION, SOLUTION INTRAVENOUS at 11:57

## 2021-06-22 RX ADMIN — ONDANSETRON 4 MG: 2 INJECTION INTRAMUSCULAR; INTRAVENOUS at 12:23

## 2021-06-22 RX ADMIN — MORPHINE SULFATE 30 MG: 30 TABLET, FILM COATED, EXTENDED RELEASE ORAL at 09:56

## 2021-06-22 ASSESSMENT — ENCOUNTER SYMPTOMS
CONSTIPATION: 0
EYES NEGATIVE: 1
ABDOMINAL PAIN: 0
MYALGIAS: 1
PSYCHIATRIC NEGATIVE: 1
RESPIRATORY NEGATIVE: 1
DIARRHEA: 0
VOMITING: 0
CHILLS: 0
CARDIOVASCULAR NEGATIVE: 1
NEUROLOGICAL NEGATIVE: 1
BACK PAIN: 1
FEVER: 0

## 2021-06-22 ASSESSMENT — PAIN DESCRIPTION - PAIN TYPE
TYPE: SURGICAL PAIN
TYPE: SURGICAL PAIN
TYPE: ACUTE PAIN;SURGICAL PAIN
TYPE: ACUTE PAIN;SURGICAL PAIN
TYPE: SURGICAL PAIN

## 2021-06-22 ASSESSMENT — PAIN SCALES - GENERAL: PAIN_LEVEL: 0

## 2021-06-22 NOTE — PROGRESS NOTES
Pt is aaox4, resting comfortably in hospital bed on 3lpm nasal cannula. The pt is fatigued and tolerating pain. Pt is medicated per MAR and provided cold packs for comfort. The pt is drinking fluids without complaint of n/v no dysphagia noted. Pt's respirations are equal and unlabored. Will continue to monitor.    Family is updated on pt's plan of care and status.

## 2021-06-22 NOTE — ANESTHESIA PREPROCEDURE EVALUATION
Relevant Problems   CARDIAC   (positive) Acute deep vein thrombosis (DVT) of femoral vein (HCC)   (positive) Occlusion of right brachial artery (HCC)   (positive) Occlusion of right carotid artery      GI   (positive) GERD (gastroesophageal reflux disease)       Physical Exam    Airway   Mallampati: II  TM distance: >3 FB  Neck ROM: full       Cardiovascular - normal exam  Rhythm: regular  Rate: normal  (-) murmur     Dental - normal exam           Pulmonary - normal exam  Breath sounds clear to auscultation     Abdominal    Neurological - normal exam                 Anesthesia Plan    ASA 3       Plan - general       Airway plan will be ETT                    Informed Consent:        hep c;   Hx of carotid occlusion. smoker

## 2021-06-22 NOTE — OR SURGEON
Immediate Post OP Note    PreOp Diagnosis: bicondylar tibial plateau fracture, right                                  Displaced distal radius fracture right      PostOp Diagnosis: same      Procedure(s):  ORIF, FRACTURE, TIBIA, PLATEAU - RIGHT - Wound Class: Clean MEDIAL AND LATERAL INCISIONS  ORIF, WRIST - RIGHT DISTAL RADIUS - Wound Class: Clean  APPLICATION OF ALLOGRAPH    Surgeon(s):  NOEMI Brennan D.O.    Anesthesiologist/Type of Anesthesia:  Anesthesiologist: Rashid Boateng M.D./General    Surgical Staff:  Circulator: Grace Montano R.N.  Relief Circulator: Kanwal Roy R.N.  Relief Scrub: Charis Santiago  Scrub Person: Zhen Andrade  First Assist: Adilia Trivedi  Radiology Technologist: Kim Hickey    Specimens removed if any:  * No specimens in log *    Estimated Blood Loss: MINIMAL    Findings: AS DESCRIBED    Complications: NONE        6/22/2021 3:37 PM Jin Odonnell M.D.

## 2021-06-22 NOTE — CARE PLAN
The patient is Watcher - Medium risk of patient condition declining or worsening    Shift Goals: Pain control, maintain skin integrity   Clinical Goals: Pain control   Patient Goals: pain control  Family Goals: n/a    Progress made toward(s) clinical / shift goals:  Q2H turns. Linen changes as needed. Discussed pain management. PRN and scheduled pain medication.     Patient is not progressing towards the following goals: N/A      Problem: Pain - Standard  Goal: Alleviation of pain or a reduction in pain to the patient’s comfort goal  Outcome: Progressing     Problem: Skin Integrity  Goal: Skin integrity is maintained or improved  Outcome: Progressing       Mobility Progress Note    Surgery patient: Yes  Date of surgery: 6/15/2021  Ambulated 50 ft on day of surgery? (N/A if patient did not undergo surgery today): N/A  Number of times ambulated 50 feet or greater today: Zero  Patient has been up to chair, edge of bed or HOB 90 degrees for all meals?: Unknown  Goal met? (goal is ambulating at least 50 feet 2 times on day shift, one time on night shift): No  If patient did not meet mobility goal, why?: Will continue to encourage mobility. Pt has general weakness.     English

## 2021-06-22 NOTE — ANESTHESIA POSTPROCEDURE EVALUATION
Patient: Jesus Gorman    Procedure Summary     Date: 06/22/21 Room / Location: [place] [address] / SURGERY [facility]    Anesthesia Start: 1148 Anesthesia Stop: 1415    Procedures:       ORIF, FRACTURE, TIBIA, PLATEAU - RIGHT (Right Knee)      ORIF, WRIST - RIGHT DISTAL RADIUS (Right Arm Lower) Diagnosis: (right wrist fracture, right knee fracture)    Surgeons: Jin Odonnell M.D. Responsible Provider: Rashid Boateng M.D.    Anesthesia Type: general ASA Status: 3          Final Anesthesia Type: general  Last vitals  BP   Blood Pressure: (!) 94/58, NIBP: (!) 92/51    Temp   36.3 °C (97.3 °F)    Pulse   78   Resp   18    SpO2   94 %      Anesthesia Post Evaluation    Patient location during evaluation: PACU  Patient participation: complete - patient participated  Level of consciousness: awake and alert  Pain score: 0    Airway patency: patent  Anesthetic complications: no  Cardiovascular status: hemodynamically stable  Respiratory status: acceptable  Hydration status: euvolemic    PONV: none          No complications documented.     Nurse Pain Score: 9 (NPRS)

## 2021-06-22 NOTE — PROGRESS NOTES
Ortho  Patient seen and examined  Agree with operative plan right wrist and right knee  RIBA all explained  Marked , no family  Odonnell
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
## 2021-06-22 NOTE — ASSESSMENT & PLAN NOTE
Persistent leukocytosis.  6/22 CXR stable right lower lobe pneumonia. UA negative. MRSA nasal swab.  Initiate vancomycin and cefepime. MRSA nasal swab.Blood and sputum cultures.  6/28 Culture negative. ABX discontinued. Resume infection surveillance.  Trend as patient allows - intermittently refusing.  7/10 WBC normal.

## 2021-06-22 NOTE — ANESTHESIA PROCEDURE NOTES
Airway    Date/Time: 6/22/2021 11:58 AM  Performed by: Rashid Boateng M.D.  Authorized by: Rashid Boateng M.D.     Location:  OR  Urgency:  Elective  Indications for Airway Management:  Anesthesia      Spontaneous Ventilation: absent    Sedation Level:  Deep  Preoxygenated: Yes    Patient Position:  Sniffing  Mask Difficulty Assessment:  1 - vent by mask  Final Airway Type:  Endotracheal airway  Final Endotracheal Airway:  ETT  Cuffed: Yes    Technique Used for Successful ETT Placement:  Direct laryngoscopy  Devices/Methods Used in Placement:  Cricoid pressure    Insertion Site:  Oral  Blade Type:  Becki  Laryngoscope Blade/Videolaryngoscope Blade Size:  3  ETT Size (mm):  7.0  Measured from:  Teeth  ETT to Teeth (cm):  20  Placement Verified by: auscultation and capnometry    Cormack-Lehane Classification:  Grade IIa - partial view of glottis  Number of Attempts at Approach:  1

## 2021-06-22 NOTE — PROGRESS NOTES
"Trauma/critical care    (O)  BP (!) 94/58   Pulse 78   Temp 36.3 °C (97.3 °F) (Temporal)   Resp 18   Ht 1.753 m (5' 9.02\")   Wt 72.4 kg (159 lb 9.8 oz)   SpO2 94%     AM labs completed. Persistent leukocytosis.  Rising WBC.   UA negative.   CXR stable with right lower lobe pneumonia.       (A/P)  - MRSA nasal swab by PCR  - Blood and sputum cultures  - Initiate broad spectrum antibiotic coverage for pneumonia. Initiate vancomycin and Cefepime.  "

## 2021-06-22 NOTE — PROGRESS NOTES
Pt is fully awake and alert prior to discharge from pacu. Respirations are equal and unlabored. Pt is in no acute distress. He is sent up on 3lpm via nasal cannula on a o2 tank reading 2/3 full with transport to verify.    Lisa Lee aware of pt arrival to unit.

## 2021-06-22 NOTE — THERAPY
Missed Therapy     Patient Name: Jesus Gorman  Age:  55 y.o., Sex:  male  Medical Record #: [id number]  Today's Date: 6/22/2021 06/22/21 0803   Interdisciplinary Plan of Care Collaboration   Collaboration Comments distal radius fx ORIF today, hold tx. Will see as able.

## 2021-06-22 NOTE — ASSESSMENT & PLAN NOTE
Great 3 right AC joint separation.  Non-operative management.  Weight bearing status - Platform weightbearing ROGELIO.  Jamil Sherman MD. Orthopedic Surgeon. [facility].
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
## 2021-06-22 NOTE — THERAPY
Missed Therapy     Patient Name: Jesus Gorman  Age:  55 y.o., Sex:  male  Medical Record #: [id number]  Today's Date: 6/22/2021    Discussed missed therapy with RN    Pt going for ORIF of distal radius and tibial plateau. Will continue to follow and resume therapy post op.

## 2021-06-22 NOTE — ANESTHESIA TIME REPORT
Anesthesia Start and Stop Event Times     Date Time Event    6/22/2021 1136 Ready for Procedure     1148 Anesthesia Start     1415 Anesthesia Stop        Responsible Staff  06/22/21    Name Role Begin End    Rashid Boateng M.D. Anesth 1148 1415        Preop Diagnosis (Free Text):  Pre-op Diagnosis     right wrist fracture, right knee fracture        Preop Diagnosis (Codes):    Post op Diagnosis  Wrist fracture      Premium Reason  Non-Premium    Comments:

## 2021-06-23 PROBLEM — M25.572 ACUTE LEFT ANKLE PAIN: Status: RESOLVED | Noted: 2021-06-17 | Resolved: 2021-06-23

## 2021-06-23 LAB
GRAM STN SPEC: NORMAL
MRSA DNA SPEC QL NAA+PROBE: NORMAL
SIGNIFICANT IND 70042: NORMAL
SIGNIFICANT IND 70042: NORMAL
SITE SITE: NORMAL
SITE SITE: NORMAL
SOURCE SOURCE: NORMAL
SOURCE SOURCE: NORMAL

## 2021-06-23 PROCEDURE — 700102 HCHG RX REV CODE 250 W/ 637 OVERRIDE(OP): Performed by: NURSE PRACTITIONER

## 2021-06-23 PROCEDURE — A9270 NON-COVERED ITEM OR SERVICE: HCPCS | Performed by: SURGERY

## 2021-06-23 PROCEDURE — 87070 CULTURE OTHR SPECIMN AEROBIC: CPT

## 2021-06-23 PROCEDURE — 51798 US URINE CAPACITY MEASURE: CPT

## 2021-06-23 PROCEDURE — 97530 THERAPEUTIC ACTIVITIES: CPT

## 2021-06-23 PROCEDURE — 700101 HCHG RX REV CODE 250: Performed by: NURSE PRACTITIONER

## 2021-06-23 PROCEDURE — A9270 NON-COVERED ITEM OR SERVICE: HCPCS | Performed by: STUDENT IN AN ORGANIZED HEALTH CARE EDUCATION/TRAINING PROGRAM

## 2021-06-23 PROCEDURE — 700102 HCHG RX REV CODE 250 W/ 637 OVERRIDE(OP): Performed by: STUDENT IN AN ORGANIZED HEALTH CARE EDUCATION/TRAINING PROGRAM

## 2021-06-23 PROCEDURE — 87205 SMEAR GRAM STAIN: CPT

## 2021-06-23 PROCEDURE — 700111 HCHG RX REV CODE 636 W/ 250 OVERRIDE (IP): Performed by: NURSE PRACTITIONER

## 2021-06-23 PROCEDURE — A9270 NON-COVERED ITEM OR SERVICE: HCPCS | Performed by: NURSE PRACTITIONER

## 2021-06-23 PROCEDURE — 97535 SELF CARE MNGMENT TRAINING: CPT

## 2021-06-23 PROCEDURE — 770006 HCHG ROOM/CARE - MED/SURG/GYN SEMI*

## 2021-06-23 PROCEDURE — 700105 HCHG RX REV CODE 258: Performed by: NURSE PRACTITIONER

## 2021-06-23 PROCEDURE — 700102 HCHG RX REV CODE 250 W/ 637 OVERRIDE(OP): Performed by: SURGERY

## 2021-06-23 PROCEDURE — 87641 MR-STAPH DNA AMP PROBE: CPT

## 2021-06-23 RX ADMIN — HYDROMORPHONE HYDROCHLORIDE 4 MG: 2 TABLET ORAL at 20:44

## 2021-06-23 RX ADMIN — HYDROMORPHONE HYDROCHLORIDE 4 MG: 2 TABLET ORAL at 12:11

## 2021-06-23 RX ADMIN — FAMOTIDINE 20 MG: 20 TABLET ORAL at 17:33

## 2021-06-23 RX ADMIN — HYDROMORPHONE HYDROCHLORIDE 4 MG: 2 TABLET ORAL at 17:33

## 2021-06-23 RX ADMIN — METAXALONE 800 MG: 800 TABLET ORAL at 17:33

## 2021-06-23 RX ADMIN — FAMOTIDINE 20 MG: 20 TABLET ORAL at 05:03

## 2021-06-23 RX ADMIN — METAXALONE 800 MG: 800 TABLET ORAL at 05:03

## 2021-06-23 RX ADMIN — CEFEPIME 2 G: 2 INJECTION, POWDER, FOR SOLUTION INTRAVENOUS at 17:34

## 2021-06-23 RX ADMIN — METAXALONE 800 MG: 800 TABLET ORAL at 12:11

## 2021-06-23 RX ADMIN — VANCOMYCIN HYDROCHLORIDE 750 MG: 500 INJECTION, POWDER, LYOPHILIZED, FOR SOLUTION INTRAVENOUS at 05:03

## 2021-06-23 RX ADMIN — HYDROMORPHONE HYDROCHLORIDE 4 MG: 2 TABLET ORAL at 02:14

## 2021-06-23 RX ADMIN — CEFEPIME 2 G: 2 INJECTION, POWDER, FOR SOLUTION INTRAVENOUS at 05:03

## 2021-06-23 RX ADMIN — MORPHINE SULFATE 30 MG: 30 TABLET, FILM COATED, EXTENDED RELEASE ORAL at 22:16

## 2021-06-23 RX ADMIN — VANCOMYCIN HYDROCHLORIDE 750 MG: 500 INJECTION, POWDER, LYOPHILIZED, FOR SOLUTION INTRAVENOUS at 12:00

## 2021-06-23 RX ADMIN — HYDROMORPHONE HYDROCHLORIDE 4 MG: 2 TABLET ORAL at 08:21

## 2021-06-23 RX ADMIN — MORPHINE SULFATE 30 MG: 30 TABLET, FILM COATED, EXTENDED RELEASE ORAL at 12:11

## 2021-06-23 RX ADMIN — FUROSEMIDE 20 MG: 20 TABLET ORAL at 05:03

## 2021-06-23 ASSESSMENT — COGNITIVE AND FUNCTIONAL STATUS - GENERAL
SUGGESTED CMS G CODE MODIFIER MOBILITY: CM
HELP NEEDED FOR BATHING: A LOT
STANDING UP FROM CHAIR USING ARMS: A LOT
TOILETING: TOTAL
SUGGESTED CMS G CODE MODIFIER DAILY ACTIVITY: CL
DAILY ACTIVITIY SCORE: 11
MOBILITY SCORE: 7
MOVING TO AND FROM BED TO CHAIR: UNABLE
MOVING FROM LYING ON BACK TO SITTING ON SIDE OF FLAT BED: UNABLE
PERSONAL GROOMING: A LOT
CLIMB 3 TO 5 STEPS WITH RAILING: TOTAL
DRESSING REGULAR UPPER BODY CLOTHING: A LOT
TURNING FROM BACK TO SIDE WHILE IN FLAT BAD: UNABLE
WALKING IN HOSPITAL ROOM: TOTAL
DRESSING REGULAR LOWER BODY CLOTHING: TOTAL
EATING MEALS: A LITTLE

## 2021-06-23 ASSESSMENT — ENCOUNTER SYMPTOMS
EYES NEGATIVE: 1
CONSTITUTIONAL NEGATIVE: 1
FEVER: 0
MYALGIAS: 1
NEUROLOGICAL NEGATIVE: 1
ABDOMINAL PAIN: 0
PSYCHIATRIC NEGATIVE: 1
GASTROINTESTINAL NEGATIVE: 1
CARDIOVASCULAR NEGATIVE: 1
CONSTIPATION: 0
VOMITING: 0
RESPIRATORY NEGATIVE: 1
DIARRHEA: 0
CHILLS: 0
BACK PAIN: 1

## 2021-06-23 ASSESSMENT — PAIN DESCRIPTION - PAIN TYPE
TYPE: SURGICAL PAIN
TYPE: ACUTE PAIN;SURGICAL PAIN
TYPE: ACUTE PAIN;SURGICAL PAIN
TYPE: SURGICAL PAIN
TYPE: SURGICAL PAIN
TYPE: ACUTE PAIN;SURGICAL PAIN

## 2021-06-23 ASSESSMENT — GAIT ASSESSMENTS: GAIT LEVEL OF ASSIST: UNABLE TO PARTICIPATE

## 2021-06-23 NOTE — THERAPY
"Occupational Therapy  Daily Treatment     Patient Name: Jesus Gorman  Age:  55 y.o., Sex:  male  Medical Record #: [id number]  Today's Date: 6/23/2021     Precautions  Precautions: Fall Risk, Non Weight Bearing Right Lower Extremity, Non Weight Bearing Right Upper Extremity, TLSO (Thoracolumbosacral orthosis), Immobilizer Right Lower Extremity  Comments: Patient acting oddly childish towards end of session    Assessment    Patient seen for OT treatment this morning, agreeable to get up and move. He had 2 additional surgeries yesterday: ORIF of R tibial plateau fx and ORIF of R distal radius fx - both NWB, now has immobilizer on RLE. Patient required Mod-Max A for bed mobility with extra time and sequenced cuing throughout. He demonstrated fair balance with static sitting at EOB, and was able to stand with Max A plus extra assistance to maintain WB through RLE and RUE. While standing, he was assisted with pericare as he had had a small BM. He was unable to get into a chair today. Will continue working with him in this setting.     Plan    Continue current treatment plan.    DC Equipment Recommendations: Unable to determine at this time  Discharge Recommendations: Recommend post-acute placement for additional occupational therapy services prior to discharge home    Subjective    \"My leg went clunk clunk.\"     Objective       06/23/21 0932   Vitals   O2 (LPM) 0   O2 Delivery Device None - Room Air   Cognition    Cognition / Consciousness X   Level of Consciousness Alert   Ability To Follow Commands 1 Step   New Learning Impaired   Sequencing Impaired   Initiation Impaired   Comments Cooperative, sometimes self limiting, makes some strange statements throughout.  Unable to maintain WB precautions   Balance   Sitting Balance (Static) Fair -   Sitting Balance (Dynamic) Poor +   Standing Balance (Static) Trace +   Weight Shift Sitting Poor   Skilled Intervention Compensatory Strategies;Facilitation   Bed Mobility    Supine " to Sit Maximal Assist   Sit to Supine Maximal Assist   Skilled Intervention Compensatory Strategies;Facilitation;Sequencing;Tactile Cuing;Verbal Cuing   Activities of Daily Living   Upper Body Dressing Moderate Assist   Toileting Maximal Assist   Functional Mobility   Sit to Stand Maximal Assist   Bed, Chair, Wheelchair Transfer Unable to Participate   Mobility sup><sit, STS   Skilled Intervention Facilitation;Postural Facilitation;Verbal Cuing   Comments STS only, x2   Activity Tolerance   Sitting Edge of Bed 15 mins   Standing 2-3 mins   Short Term Goals   Short Term Goal # 1 Pt will complete ADL transfers with min A   Goal Outcome # 1 Goal not met   Short Term Goal # 2 Pt will complete UB dressing with min A using jodi technique    Goal Outcome # 2 Progressing slower than expected   Short Term Goal # 3 Pt will complete seated grooming with supv    Goal Outcome # 3 Progressing slower than expected   Short Term Goal # 4 Pt will increase R mass grasp to 100% to incorporate as stabilizer during bimanual functional tasks    Goal Outcome # 4 Goal not met

## 2021-06-23 NOTE — PROGRESS NOTES
Received patient from PACU alert and oriented x 4 with pain of 9/10 at right arm, leg and wrist and back.    2 RN SKIN CHECK  2 RN skin check complete JL RN   Devices in place: SCDs and Nasal Cannula.  Skin assessed under devices: yes.  Confirmed pressure ulcers found on: None.  New potential pressure ulcers noted on None  Wound consult placed N/A.  The following interventions in place Pillows and Mepilex under right leg.   Noted with multiple scabs at BLE, BUE; redness on left outer knee blanching; immobilizer at right leg; gauze and transparent film also noted on the right hip  #3 sites; cast at right wrist. Turned and prepositioned every 2 hours.

## 2021-06-23 NOTE — CARE PLAN
The patient is Stable - Low risk of patient condition declining or worsening    Shift Goals  Clinical Goals: Pain control   Patient Goals: pain control  Family Goals: n/a    Progress made toward(s) clinical / shift goals:  Went over POC.    Patient is not progressing towards the following goals:

## 2021-06-23 NOTE — THERAPY
"Physical Therapy   Daily Treatment     Patient Name: Jesus Gorman  Age:  55 y.o., Sex:  male  Medical Record #: [id number]  Today's Date: 6/23/2021     Precautions: Fall Risk, Non Weight Bearing Right Lower Extremity, Non Weight Bearing Right Upper Extremity, TLSO (Thoracolumbosacral orthosis), Spinal / Back Precautions , Immobilizer Right Lower Extremity    Assessment    Patient continues to be limited by pain, apparent impaired cognition, functional weakness, and decreased activity tolerance which are limiting his ability to safely perform mobility. He required mod to max A for all mobility but was able to stand from EOB x2 with max A and second person to prevent WB through RLE. He tolerated sitting EOB for approximately 15 min with no signs/symptoms of abnormal HDR. Transfer deferred given concern for safety. Will continue to follow.    Plan    Continue current treatment plan.    DC Equipment Recommendations: Unable to determine at this time  Discharge Recommendations: Recommend post-acute placement for additional physical therapy services prior to discharge home      Subjective    \"My leg keeps going clunk clunk.\"     Objective       06/23/21 0928   Total Time Spent   Total Time Spent (Mins) 38   Charge Group   Charges  Yes   PT Therapeutic Activities 3   Precautions   Precautions Fall Risk;Non Weight Bearing Right Lower Extremity;Non Weight Bearing Right Upper Extremity;TLSO (Thoracolumbosacral orthosis);Spinal / Back Precautions ;Immobilizer Right Lower Extremity   Comments strange affect/cognition   Vitals   Pulse Oximetry 92 %   O2 (LPM) 0   O2 Delivery Device None - Room Air   Pain 0 - 10 Group   Location Back;Buttock   Therapist Pain Assessment During Activity;Post Activity Pain Same as Prior to Activity;Nurse Notified   Cognition    Cognition / Consciousness X   Level of Consciousness Alert   New Learning Impaired   Sequencing Impaired   Initiation Impaired   Comments cooperative but self limiting. " strange affect and at times nonsensical speech though this may be behavioral. no demonstration of learning   Passive ROM Lower Body   Passive ROM Lower Body X   Comments RLE in immobilizer, LLE limited by pain but question behavioral component   Active ROM Lower Body    Active ROM Lower Body  X   Comments as above   Strength Lower Body   Lower Body Strength  X   Comments as above, LLE grossly 3+/5 but required max A for hip extension to stand   Sensation Lower Body   Lower Extremity Sensation   Not Tested   Lower Body Muscle Tone   Lower Body Muscle Tone  WDL   Balance   Sitting Balance (Static) Fair -   Sitting Balance (Dynamic) Poor +   Standing Balance (Static) Trace +   Weight Shift Sitting Poor   Skilled Intervention Compensatory Strategies;Facilitation;Postural Facilitation;Sequencing;Tactile Cuing;Verbal Cuing   Comments mod A for balance in standing, also required assist to maintain NWB RLE   Gait Analysis   Gait Level Of Assist Unable to Participate   Weight Bearing Status NWB R extremities   Vision Deficits Impacting Mobility NT   Bed Mobility    Supine to Sit Moderate Assist   Sit to Supine Moderate Assist   Scooting Maximal Assist   Skilled Intervention Compensatory Strategies;Facilitation;Postural Facilitation;Sequencing;Tactile Cuing;Verbal Cuing   Functional Mobility   Sit to Stand Maximal Assist   Bed, Chair, Wheelchair Transfer Unable to Participate  (deferred due to safety concerns)   Transfer Method Other (Comments)  (STS only)   How much difficulty does the patient currently have...   Turning over in bed (including adjusting bedclothes, sheets and blankets)? 1   Sitting down on and standing up from a chair with arms (e.g., wheelchair, bedside commode, etc.) 1   Moving from lying on back to sitting on the side of the bed? 1   How much help from another person does the patient currently need...   Moving to and from a bed to a chair (including a wheelchair)? 2   Need to walk in a hospital room? 1    Climbing 3-5 steps with a railing? 1   6 clicks Mobility Score 7   Activity Tolerance   Sitting in Chair unable   Sitting Edge of Bed 15 min   Standing 5 min total   Comments limited by cognition, pain   Patient / Family Goals    Patient / Family Goal #1 to improve activity tolerance    Goal #1 Outcome Goal not met   Short Term Goals    Short Term Goal # 1 Pt will perform supine<>sit from flat HOB/no railing with supervision within 6 visits to ensure independent mobility at home.   Goal Outcome # 1 goal not met   Short Term Goal # 2 Pt will perform sit<>stand with hemiwalker vs crutch with supervision within 6 visits to ensure progression to independence.    Goal Outcome # 2 Goal not met   Short Term Goal # 3 Pt will squat pivot to the left with supervision wihin 6 visits to increase OOB time.    Goal Outcome # 3 Goal not met   Short Term Goal # 4 Pt will ambulate x 50ft with left crutch vs hemiwalker with min A within 6 visits to progress to independence.    Anticipated Discharge Equipment and Recommendations   DC Equipment Recommendations Unable to determine at this time   Discharge Recommendations Recommend post-acute placement for additional physical therapy services prior to discharge home   Interdisciplinary Plan of Care Collaboration   IDT Collaboration with  Nursing;Occupational Therapist   Patient Position at End of Therapy In Bed;Call Light within Reach;Tray Table within Reach;Phone within Reach   Collaboration Comments RN aware of visit, response   Session Information   Date / Session Number  6/23-2 (2/3, 6/23)   Priority   (.)

## 2021-06-23 NOTE — OP REPORT
DATE OF SERVICE:  06/22/2021     PREOPERATIVE DIAGNOSES:  1.  Closed bicondylar right tibial plateau fracture.  2.   Closed extraarticular right distal radius fracture.     OPERATIONS PERFORMED:  1.  Open reduction and internal fixation of bicondylar tibial plateau   fracture.  2.  Open reduction and internal fixation of right distal radius fracture.  3.  Application of allograft, right wrist.     TOURNIQUET TIME:  45 minutes on the leg and approximately 30 minutes on the   arm.     MEDICATIONS UTILIZED:  Vancomycin.     COMPLICATIONS:  None.     DESCRIPTION OF PROCEDURE:  The patient was brought to the operating room   awake, alert, placed on the operating table in supine position, delivered   general endotracheal anesthetic.  The right upper extremity and the right   lower extremity were then shaved, scrubbed, prepped and draped in normal   sterile routine fashion.  Appropriate extremities were identified, timeout was   completed and the operation began.     Our attention was first turned to the tibial plateau.  Fluoroscopy was brought   into the operative field.  The OSI table was utilized and OIC implants were   utilized.     The right lower extremity was then exsanguinated and then a medial incision at   the joint line in the mid axis of the tibia subcutaneous tissue dissected   down, the hamstrings were identified and then reflected both proximal and   distal for plate fixation.  We were easily able to anatomically reduce the   medial column and using an OIC T-plate, we stabilized the fracture with screws   above and below in an anatomic fashion.     This wound was now copiously irrigated, closed in layers with staples and then   injected with local.     Our attention was then turned to the lateral side.  Hockey stick incision was   made, subcutaneous tissue dissected down and part of the anterior musculature   was removed off the crest of the tibia.     This gave us easy access to the window of the lateral  column.  We basically   opened the lateral column enough to elevate the depressed fragments and then   held it with a reduction clamp and pinned it with a K-wire, checked both AP   and lateral, excellent alignment, position of the reduction, we were   satisfied.     We now took the right lateral OIC plate, held it into position, checked   radiographically and then simply applied multiple screws proximally in a   locking fashion and then nonlocking lag screws distally.     At the completion of the procedure.  X-rays, both AP and lateral demonstrated   excellent alignment and position.  We now simply closed this wound with #1   Vicryl, 2-0 subQ skin staples.  Injected this with local.     Tourniquet was deflated.  Sterile dressing applied and our attention was now   turned to the right upper extremity.     The extremity was exsanguinated and then a straight incision over the flexor   carpi radialis, subcutaneous tissue dissected.  The neurovascular bundle was   reflected radially and the ulnar nerve and carpal canal components were   reflected ulnarly.  This gave us easy access to the pronator, which was   removed off of the radial side.     This gave us easy access to the fracture.  The fracture interface was   comminuted.  We brought it out to length and pinned it with a 2.0 K-wire and   identified acceptable alignment both AP and lateral.     We now took the OIC distal radius plate, held it into position and then pinned   it with K wires. Showed excellent position and alignment.  We now simply   placed multiple screws in the metaphysis and then 3.5 mm screws in the   diaphysis.  Post-reduction x-rays demonstrated excellent position of the   reduction and the fixation.  Due to the defect at the metadiaphyseal junction,   we elected to pack the defect with small allograft bone chips.     This wound was now irrigated, closed with 2-0 and then staples.  A volar   splint was applied with the hand in neutral and the  patient was taken to   recovery room in stable condition.  No intraoperative or immediate   postoperative complications.  The patient tolerated the procedure well.        ______________________________  MD GURPREET Grijalva/RODO/MACIEL    DD:  06/22/2021 15:45  DT:  06/22/2021 17:07    Job#:  [id number]
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
## 2021-06-23 NOTE — CARE PLAN
The patient is Stable - Low risk of patient condition declining or worsening    Clinical Goals: Pain control   Patient Goal: pain control, rest    Progress made toward(s) clinical / shift goals:   Patient is not progressing towards the following goals: pain control    Problem: Pain - Standard  Goal: Alleviation of pain or a reduction in pain to the patient’s comfort goal  6/23/2021 0348 by Farhan Brown, R.N.  Outcome: Progressing   Pt reports of constant pain, Medicated per MAR. Other comfort measures include: ice pack, repositioning, elevating RUE and RLE with pillows, food, distraction.  Problem: Communication  Goal: The ability to communicate needs accurately and effectively will improve  Outcome: Progressing   Discussed POC with patient Updated white board Calls appropriately  Call light within reach

## 2021-06-23 NOTE — CARE PLAN
The patient is Stable - Low risk of patient condition declining or worsening    Shift Goals  Clinical Goals: Pain control   Patient Goals: pain control  Family Goals: n/a    Progress made toward(s) clinical / shift goals:    Patient is not progressing towards the following goals:    Problem: Pain - Standard  Goal: Alleviation of pain or a reduction in pain to the patient’s comfort goal  6/22/2021 1858 by NEETA EduardoN.  Outcome: Progressing      Problem: Skin Integrity  Goal: Skin integrity is maintained or improved  6/22/2021 1857 by Lisa Martínez, R.N.  Outcome: Progressing

## 2021-06-24 PROBLEM — R33.9 URINARY RETENTION: Status: ACTIVE | Noted: 2021-06-24

## 2021-06-24 PROCEDURE — A9270 NON-COVERED ITEM OR SERVICE: HCPCS | Performed by: NURSE PRACTITIONER

## 2021-06-24 PROCEDURE — 51798 US URINE CAPACITY MEASURE: CPT

## 2021-06-24 PROCEDURE — 700102 HCHG RX REV CODE 250 W/ 637 OVERRIDE(OP): Performed by: NURSE PRACTITIONER

## 2021-06-24 PROCEDURE — A9270 NON-COVERED ITEM OR SERVICE: HCPCS | Performed by: SURGERY

## 2021-06-24 PROCEDURE — 700111 HCHG RX REV CODE 636 W/ 250 OVERRIDE (IP): Performed by: NURSE PRACTITIONER

## 2021-06-24 PROCEDURE — 700101 HCHG RX REV CODE 250: Performed by: NURSE PRACTITIONER

## 2021-06-24 PROCEDURE — 700102 HCHG RX REV CODE 250 W/ 637 OVERRIDE(OP): Performed by: SURGERY

## 2021-06-24 PROCEDURE — A9270 NON-COVERED ITEM OR SERVICE: HCPCS | Performed by: STUDENT IN AN ORGANIZED HEALTH CARE EDUCATION/TRAINING PROGRAM

## 2021-06-24 PROCEDURE — 770006 HCHG ROOM/CARE - MED/SURG/GYN SEMI*

## 2021-06-24 PROCEDURE — 700102 HCHG RX REV CODE 250 W/ 637 OVERRIDE(OP): Performed by: STUDENT IN AN ORGANIZED HEALTH CARE EDUCATION/TRAINING PROGRAM

## 2021-06-24 RX ORDER — TAMSULOSIN HYDROCHLORIDE 0.4 MG/1
0.8 CAPSULE ORAL DAILY
Status: DISCONTINUED | OUTPATIENT
Start: 2021-06-25 | End: 2021-07-06

## 2021-06-24 RX ADMIN — HYDROMORPHONE HYDROCHLORIDE 4 MG: 2 TABLET ORAL at 08:09

## 2021-06-24 RX ADMIN — HYDROMORPHONE HYDROCHLORIDE 4 MG: 2 TABLET ORAL at 11:28

## 2021-06-24 RX ADMIN — MORPHINE SULFATE 30 MG: 30 TABLET, FILM COATED, EXTENDED RELEASE ORAL at 11:28

## 2021-06-24 RX ADMIN — TAMSULOSIN HYDROCHLORIDE 0.4 MG: 0.4 CAPSULE ORAL at 05:29

## 2021-06-24 RX ADMIN — HYDROMORPHONE HYDROCHLORIDE 4 MG: 2 TABLET ORAL at 00:25

## 2021-06-24 RX ADMIN — HYDROMORPHONE HYDROCHLORIDE 4 MG: 2 TABLET ORAL at 21:44

## 2021-06-24 RX ADMIN — POLYETHYLENE GLYCOL 3350 1 PACKET: 17 POWDER, FOR SOLUTION ORAL at 05:27

## 2021-06-24 RX ADMIN — GABAPENTIN 300 MG: 300 CAPSULE ORAL at 05:28

## 2021-06-24 RX ADMIN — HYDROMORPHONE HYDROCHLORIDE 4 MG: 2 TABLET ORAL at 05:29

## 2021-06-24 RX ADMIN — DOCUSATE SODIUM 50 MG AND SENNOSIDES 8.6 MG 1 TABLET: 8.6; 5 TABLET, FILM COATED ORAL at 21:01

## 2021-06-24 RX ADMIN — HYDROMORPHONE HYDROCHLORIDE 4 MG: 2 TABLET ORAL at 18:24

## 2021-06-24 RX ADMIN — GABAPENTIN 300 MG: 300 CAPSULE ORAL at 17:06

## 2021-06-24 RX ADMIN — METAXALONE 800 MG: 800 TABLET ORAL at 11:28

## 2021-06-24 RX ADMIN — FUROSEMIDE 20 MG: 20 TABLET ORAL at 05:29

## 2021-06-24 RX ADMIN — METAXALONE 800 MG: 800 TABLET ORAL at 05:28

## 2021-06-24 RX ADMIN — FAMOTIDINE 20 MG: 20 TABLET ORAL at 05:28

## 2021-06-24 RX ADMIN — DOCUSATE SODIUM 100 MG: 100 CAPSULE ORAL at 05:29

## 2021-06-24 RX ADMIN — METAXALONE 800 MG: 800 TABLET ORAL at 17:07

## 2021-06-24 RX ADMIN — HYDROMORPHONE HYDROCHLORIDE 4 MG: 2 TABLET ORAL at 15:03

## 2021-06-24 RX ADMIN — CEFEPIME 2 G: 2 INJECTION, POWDER, FOR SOLUTION INTRAVENOUS at 05:26

## 2021-06-24 RX ADMIN — ENOXAPARIN SODIUM 80 MG: 80 INJECTION SUBCUTANEOUS at 17:07

## 2021-06-24 RX ADMIN — GABAPENTIN 300 MG: 300 CAPSULE ORAL at 11:28

## 2021-06-24 RX ADMIN — CEFEPIME 2 G: 2 INJECTION, POWDER, FOR SOLUTION INTRAVENOUS at 17:07

## 2021-06-24 RX ADMIN — FAMOTIDINE 20 MG: 20 TABLET ORAL at 17:07

## 2021-06-24 RX ADMIN — DOCUSATE SODIUM 100 MG: 100 CAPSULE ORAL at 17:07

## 2021-06-24 ASSESSMENT — ENCOUNTER SYMPTOMS
CARDIOVASCULAR NEGATIVE: 1
EYES NEGATIVE: 1
VOMITING: 0
RESPIRATORY NEGATIVE: 1
DIARRHEA: 0
CHILLS: 0
MYALGIAS: 1
NEUROLOGICAL NEGATIVE: 1
CONSTIPATION: 0
BACK PAIN: 1
ABDOMINAL PAIN: 0
CONSTITUTIONAL NEGATIVE: 1
PSYCHIATRIC NEGATIVE: 1
FEVER: 0
GASTROINTESTINAL NEGATIVE: 1

## 2021-06-24 ASSESSMENT — PAIN DESCRIPTION - PAIN TYPE
TYPE: ACUTE PAIN;SURGICAL PAIN

## 2021-06-24 NOTE — CARE PLAN
Problem: Pain - Standard  Goal: Alleviation of pain or a reduction in pain to the patient’s comfort goal  Outcome: Progressing     Problem: Knowledge Deficit - Standard  Goal: Patient and family/care givers will demonstrate understanding of plan of care, disease process/condition, diagnostic tests and medications  Outcome: Progressing     Problem: Skin Integrity  Goal: Skin integrity is maintained or improved  Outcome: Progressing     The patient is Stable - Low risk of patient condition declining or worsening    Shift Goals  Clinical Goals: Pain control   Patient Goals: pain control  Family Goals: n/a    Progress made toward(s) clinical / shift goals:  Fall precautions in place. Low fall risk. Patient able to reposition self independently. Bed locked and placed in lowest position. Non-skid footwear on. Clutter-free environment promoted. Call light and personal belongings are within reach. Enforced use of call light. All needs met at this time. Hourly monitoring in place. Patient resting comfortably. Meds given per MAR
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
## 2021-06-24 NOTE — DISCHARGE PLANNING
Received Choice form at 0924  Agency/Facility Name: [facility] [place]   Referral sent per Choice form @ 1804

## 2021-06-24 NOTE — PROGRESS NOTES
"Orthopaedic [facility] Progress Note    Author: Chelsea. Date & Time created: 6/23/2021   3:30 PM     Interval Events:  Patient doing well  Dressings CDI  POD#1 S/P L Plateau, R wrist ORIF   POD#8 S/P Retro IMN R femur, L SI screw, R ant iliac spine screw    Review of Systems   Constitutional: Negative.    Cardiovascular: Chest pain: rib fx    Gastrointestinal: Negative.    Musculoskeletal:        Pain well controlled    Neurological: Negative.      Hemodynamics:  /69   Pulse 85   Temp 36.6 °C (97.8 °F) (Temporal)   Resp 17   Ht 1.753 m (5' 9.02\")   Wt 72.4 kg (159 lb 9.8 oz)   SpO2 91%      No Active Precaution Orders    Respiratory:    Respiration: 17, Pulse Oximetry: 91 %           Physical Exam   HENT:   Head: Normocephalic and atraumatic.   Pulmonary/Chest: He exhibits tenderness (rib fx ).   Musculoskeletal:      Comments: RUE splint CDI, DNVI, moves all fingers, cap refill <2 sec. RLE and pelvic dressing CDI, DNVI, cap refill <2 sec.      Labs:  Recent Labs     06/21/21  1306 06/22/21  1115   WBC 16.3* 19.3*   RBC 3.21* 2.77*   HEMOGLOBIN 9.4* 8.1*   HEMATOCRIT 29.6* 25.3*   MCV 92.2 91.3   MCH 29.3 29.2   MCHC 31.8* 32.0*   RDW 49.0 47.5   PLATELETCT 259 296   MPV 10.6 10.4     Recent Labs     06/21/21  1306   SODIUM 133*   POTASSIUM 4.1   CHLORIDE 101   CO2 22   GLUCOSE 152*   BUN 14   CREATININE 0.63   CALCIUM 8.0*       Medical Decision Making/Problem List:    Active Hospital Problems    Diagnosis    • Hypotension [I95.9]    • Status post cardiac surgery [Z98.890]    • Respiratory failure following trauma ([facility]) [J96.90]    • Closed fracture of right femur ([facility]) [S72.91XA]    • Closed fracture of distal end of right radius [S52.501A]    • Screening examination for infectious disease [Z11.9]    • Contraindication to deep vein thrombosis (DVT) prophylaxis [Z53.09]    • Eyelid laceration, right, initial encounter [S01.111A]    • Wedge fracture of lumbar vertebra ([facility]) [S32.000A]    • 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  Clearance for lovenox/heparin: per trauma   Weight Bearing Status: NWB RUE and RLE  Wounds & Drains: dressings changed every other day by nursing  Disposition and Follow-up: per therapy recs

## 2021-06-24 NOTE — CARE PLAN
Problem: Knowledge Deficit - Standard  Goal: Patient and family/care givers will demonstrate understanding of plan of care, disease process/condition, diagnostic tests and medications  Outcome: Progressing     Problem: Skin Integrity  Goal: Skin integrity is maintained or improved  Outcome: Progressing   The patient is Stable - Low risk of patient condition declining or worsening    Shift Goals  Clinical Goals: Pain control   Patient Goals: pain control  Family Goals: n/a    Progress made toward(s) clinical / shift goals:      Patient is not progressing towards the following goals:

## 2021-06-24 NOTE — PROGRESS NOTES
"Orthopaedic PA[facility] Progress Note    Author: Chelsea. Date & Time created: 6/24/2021   12:30 PM     Interval Events:  Patient doing well  Dressings CDI  POD#2 S/P L Plateau, R wrist ORIF   POD#9 S/P Retro IMN R femur, L SI screw, R ant iliac spine screw    Review of Systems   Constitutional: Negative.    Cardiovascular: Chest pain: rib fx    Gastrointestinal: Negative.    Musculoskeletal:        Pain well controlled    Neurological: Negative.      Hemodynamics:  /69   Pulse 92   Temp 36.1 °C (97 °F) (Temporal)   Resp 17   Ht 1.753 m (5' 9.02\")   Wt 72.4 kg (159 lb 9.8 oz)   SpO2 94%      No Active Precaution Orders    Respiratory:    Respiration: 17, Pulse Oximetry: 94 %        RUL Breath Sounds: Clear, RML Breath Sounds: Clear, RLL Breath Sounds: Diminished, ROD Breath Sounds: Clear, LLL Breath Sounds: Diminished  Physical Exam   HENT:   Head: Normocephalic and atraumatic.   Pulmonary/Chest: He exhibits tenderness (rib fx ).   Musculoskeletal:      Comments: RUE splint CDI, DNVI, moves all fingers, cap refill <2 sec. RLE and pelvic dressing CDI, DNVI, cap refill <2 sec.      Labs:  Recent Labs     06/22/21  1115   WBC 19.3*   RBC 2.77*   HEMOGLOBIN 8.1*   HEMATOCRIT 25.3*   MCV 91.3   MCH 29.2   MCHC 32.0*   RDW 47.5   PLATELETCT 296   MPV 10.4           Medical Decision Making/Problem List:    Active Hospital Problems    Diagnosis    • Hypotension [I95.9]    • Status post cardiac surgery [Z98.890]    • Respiratory failure following trauma ([facility]) [J96.90]    • Closed fracture of right femur ([facility]) [S72.91XA]    • Closed fracture of distal end of right radius [S52.501A]    • Screening examination for infectious disease [Z11.9]    • Contraindication to deep vein thrombosis (DVT) prophylaxis [Z53.09]    • Eyelid laceration, right, initial encounter [S01.111A]    • Wedge fracture of lumbar vertebra ([facility]) [S32.000A]    • 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  Clearance for lovenox/heparin: per trauma   Weight Bearing Status: NWB RUE and RLE  Wounds & Drains: dressings changed every other day by nursing  Disposition and Follow-up: per therapy recs

## 2021-06-25 ENCOUNTER — APPOINTMENT (OUTPATIENT)
Dept: RADIOLOGY | Facility: MEDICAL CENTER | Age: 56
DRG: 956 | End: 2021-06-25
Attending: SURGERY
Payer: COMMERCIAL

## 2021-06-25 ENCOUNTER — APPOINTMENT (OUTPATIENT)
Dept: RADIOLOGY | Facility: MEDICAL CENTER | Age: 56
DRG: 956 | End: 2021-06-25
Attending: NURSE PRACTITIONER
Payer: COMMERCIAL

## 2021-06-25 LAB
ANION GAP SERPL CALC-SCNC: 8 MMOL/L (ref 7–16)
ANISOCYTOSIS BLD QL SMEAR: ABNORMAL
BACTERIA SPEC RESP CULT: NORMAL
BASOPHILS # BLD AUTO: 0 % (ref 0–1.8)
BASOPHILS # BLD: 0 K/UL (ref 0–0.12)
BUN SERPL-MCNC: 15 MG/DL (ref 8–22)
BURR CELLS BLD QL SMEAR: NORMAL
CALCIUM SERPL-MCNC: 8.3 MG/DL (ref 8.5–10.5)
CHLORIDE SERPL-SCNC: 102 MMOL/L (ref 96–112)
CO2 SERPL-SCNC: 26 MMOL/L (ref 20–33)
CREAT SERPL-MCNC: 0.65 MG/DL (ref 0.5–1.4)
EOSINOPHIL # BLD AUTO: 0.68 K/UL (ref 0–0.51)
EOSINOPHIL NFR BLD: 4.3 % (ref 0–6.9)
ERYTHROCYTE [DISTWIDTH] IN BLOOD BY AUTOMATED COUNT: 53 FL (ref 35.9–50)
GLUCOSE SERPL-MCNC: 113 MG/DL (ref 65–99)
GRAM STN SPEC: NORMAL
HCT VFR BLD AUTO: 27.4 % (ref 42–52)
HGB BLD-MCNC: 8.4 G/DL (ref 14–18)
LYMPHOCYTES # BLD AUTO: 1.37 K/UL (ref 1–4.8)
LYMPHOCYTES NFR BLD: 8.6 % (ref 22–41)
MACROCYTES BLD QL SMEAR: ABNORMAL
MANUAL DIFF BLD: NORMAL
MCH RBC QN AUTO: 29.3 PG (ref 27–33)
MCHC RBC AUTO-ENTMCNC: 30.7 G/DL (ref 33.7–35.3)
MCV RBC AUTO: 95.5 FL (ref 81.4–97.8)
METAMYELOCYTES NFR BLD MANUAL: 4.3 %
MONOCYTES # BLD AUTO: 0.27 K/UL (ref 0–0.85)
MONOCYTES NFR BLD AUTO: 1.7 % (ref 0–13.4)
MORPHOLOGY BLD-IMP: NORMAL
MYELOCYTES NFR BLD MANUAL: 1.7 %
NEUTROPHILS # BLD AUTO: 12.62 K/UL (ref 1.82–7.42)
NEUTROPHILS NFR BLD: 75.9 % (ref 44–72)
NEUTS BAND NFR BLD MANUAL: 3.5 % (ref 0–10)
NRBC # BLD AUTO: 0.1 K/UL
NRBC BLD-RTO: 0.6 /100 WBC
OVALOCYTES BLD QL SMEAR: NORMAL
PLATELET # BLD AUTO: 362 K/UL (ref 164–446)
PLATELET BLD QL SMEAR: NORMAL
PMV BLD AUTO: 10.3 FL (ref 9–12.9)
POIKILOCYTOSIS BLD QL SMEAR: NORMAL
POLYCHROMASIA BLD QL SMEAR: NORMAL
POTASSIUM SERPL-SCNC: 4.3 MMOL/L (ref 3.6–5.5)
RBC # BLD AUTO: 2.87 M/UL (ref 4.7–6.1)
RBC BLD AUTO: PRESENT
SIGNIFICANT IND 70042: NORMAL
SITE SITE: NORMAL
SODIUM SERPL-SCNC: 136 MMOL/L (ref 135–145)
SOURCE SOURCE: NORMAL
WBC # BLD AUTO: 15.9 K/UL (ref 4.8–10.8)

## 2021-06-25 PROCEDURE — 770006 HCHG ROOM/CARE - MED/SURG/GYN SEMI*

## 2021-06-25 PROCEDURE — A9270 NON-COVERED ITEM OR SERVICE: HCPCS | Performed by: NURSE PRACTITIONER

## 2021-06-25 PROCEDURE — 700102 HCHG RX REV CODE 250 W/ 637 OVERRIDE(OP): Performed by: NURSE PRACTITIONER

## 2021-06-25 PROCEDURE — 700101 HCHG RX REV CODE 250: Performed by: NURSE PRACTITIONER

## 2021-06-25 PROCEDURE — A9270 NON-COVERED ITEM OR SERVICE: HCPCS | Performed by: SURGERY

## 2021-06-25 PROCEDURE — 97530 THERAPEUTIC ACTIVITIES: CPT | Mod: CQ

## 2021-06-25 PROCEDURE — 71045 X-RAY EXAM CHEST 1 VIEW: CPT

## 2021-06-25 PROCEDURE — 85007 BL SMEAR W/DIFF WBC COUNT: CPT

## 2021-06-25 PROCEDURE — 700102 HCHG RX REV CODE 250 W/ 637 OVERRIDE(OP): Performed by: SURGERY

## 2021-06-25 PROCEDURE — 92507 TX SP LANG VOICE COMM INDIV: CPT

## 2021-06-25 PROCEDURE — 700111 HCHG RX REV CODE 636 W/ 250 OVERRIDE (IP): Performed by: NURSE PRACTITIONER

## 2021-06-25 PROCEDURE — 80048 BASIC METABOLIC PNL TOTAL CA: CPT

## 2021-06-25 PROCEDURE — A9270 NON-COVERED ITEM OR SERVICE: HCPCS | Performed by: STUDENT IN AN ORGANIZED HEALTH CARE EDUCATION/TRAINING PROGRAM

## 2021-06-25 PROCEDURE — 97110 THERAPEUTIC EXERCISES: CPT | Mod: CQ

## 2021-06-25 PROCEDURE — 700102 HCHG RX REV CODE 250 W/ 637 OVERRIDE(OP): Performed by: STUDENT IN AN ORGANIZED HEALTH CARE EDUCATION/TRAINING PROGRAM

## 2021-06-25 PROCEDURE — 97530 THERAPEUTIC ACTIVITIES: CPT

## 2021-06-25 PROCEDURE — 85027 COMPLETE CBC AUTOMATED: CPT

## 2021-06-25 PROCEDURE — 97535 SELF CARE MNGMENT TRAINING: CPT

## 2021-06-25 RX ADMIN — FAMOTIDINE 20 MG: 20 TABLET ORAL at 06:17

## 2021-06-25 RX ADMIN — HYDROMORPHONE HYDROCHLORIDE 4 MG: 2 TABLET ORAL at 06:45

## 2021-06-25 RX ADMIN — BISACODYL 10 MG: 10 SUPPOSITORY RECTAL at 02:42

## 2021-06-25 RX ADMIN — METAXALONE 800 MG: 800 TABLET ORAL at 18:17

## 2021-06-25 RX ADMIN — GABAPENTIN 300 MG: 300 CAPSULE ORAL at 18:17

## 2021-06-25 RX ADMIN — HYDROMORPHONE HYDROCHLORIDE 4 MG: 2 TABLET ORAL at 16:23

## 2021-06-25 RX ADMIN — CEFEPIME 2 G: 2 INJECTION, POWDER, FOR SOLUTION INTRAVENOUS at 22:27

## 2021-06-25 RX ADMIN — HYDROMORPHONE HYDROCHLORIDE 4 MG: 2 TABLET ORAL at 19:37

## 2021-06-25 RX ADMIN — FUROSEMIDE 20 MG: 20 TABLET ORAL at 06:17

## 2021-06-25 RX ADMIN — DOCUSATE SODIUM 100 MG: 100 CAPSULE ORAL at 06:17

## 2021-06-25 RX ADMIN — GABAPENTIN 300 MG: 300 CAPSULE ORAL at 11:56

## 2021-06-25 RX ADMIN — ENOXAPARIN SODIUM 80 MG: 80 INJECTION SUBCUTANEOUS at 18:17

## 2021-06-25 RX ADMIN — HYDROMORPHONE HYDROCHLORIDE 4 MG: 2 TABLET ORAL at 10:00

## 2021-06-25 RX ADMIN — METAXALONE 800 MG: 800 TABLET ORAL at 11:56

## 2021-06-25 RX ADMIN — HYDROMORPHONE HYDROCHLORIDE 4 MG: 2 TABLET ORAL at 03:45

## 2021-06-25 RX ADMIN — MORPHINE SULFATE 30 MG: 30 TABLET, FILM COATED, EXTENDED RELEASE ORAL at 22:28

## 2021-06-25 RX ADMIN — HYDROMORPHONE HYDROCHLORIDE 4 MG: 2 TABLET ORAL at 13:01

## 2021-06-25 RX ADMIN — METAXALONE 800 MG: 800 TABLET ORAL at 06:18

## 2021-06-25 RX ADMIN — CEFEPIME 2 G: 2 INJECTION, POWDER, FOR SOLUTION INTRAVENOUS at 09:48

## 2021-06-25 RX ADMIN — MORPHINE SULFATE 30 MG: 30 TABLET, FILM COATED, EXTENDED RELEASE ORAL at 11:56

## 2021-06-25 RX ADMIN — HYDROMORPHONE HYDROCHLORIDE 4 MG: 2 TABLET ORAL at 00:30

## 2021-06-25 RX ADMIN — ENOXAPARIN SODIUM 80 MG: 80 INJECTION SUBCUTANEOUS at 06:17

## 2021-06-25 RX ADMIN — FAMOTIDINE 20 MG: 20 TABLET ORAL at 18:17

## 2021-06-25 ASSESSMENT — COGNITIVE AND FUNCTIONAL STATUS - GENERAL
DRESSING REGULAR LOWER BODY CLOTHING: A LOT
MOVING FROM LYING ON BACK TO SITTING ON SIDE OF FLAT BED: UNABLE
PERSONAL GROOMING: A LOT
EATING MEALS: A LITTLE
DAILY ACTIVITIY SCORE: 12
DRESSING REGULAR UPPER BODY CLOTHING: A LOT
MOVING TO AND FROM BED TO CHAIR: UNABLE
TURNING FROM BACK TO SIDE WHILE IN FLAT BAD: UNABLE
TOILETING: TOTAL
SUGGESTED CMS G CODE MODIFIER MOBILITY: CM
MOBILITY SCORE: 7
SUGGESTED CMS G CODE MODIFIER DAILY ACTIVITY: CL
STANDING UP FROM CHAIR USING ARMS: A LOT
WALKING IN HOSPITAL ROOM: TOTAL
CLIMB 3 TO 5 STEPS WITH RAILING: TOTAL
HELP NEEDED FOR BATHING: A LOT

## 2021-06-25 ASSESSMENT — ENCOUNTER SYMPTOMS
ABDOMINAL PAIN: 0
EYES NEGATIVE: 1
RESPIRATORY NEGATIVE: 1
PSYCHIATRIC NEGATIVE: 1
CONSTIPATION: 0
CONSTITUTIONAL NEGATIVE: 1
FEVER: 0
GASTROINTESTINAL NEGATIVE: 1
BACK PAIN: 1
CARDIOVASCULAR NEGATIVE: 1
MYALGIAS: 1
NEUROLOGICAL NEGATIVE: 1
CHILLS: 0
VOMITING: 0
DIARRHEA: 0

## 2021-06-25 ASSESSMENT — PAIN DESCRIPTION - PAIN TYPE
TYPE: ACUTE PAIN;SURGICAL PAIN
TYPE: ACUTE PAIN;SURGICAL PAIN
TYPE: ACUTE PAIN
TYPE: ACUTE PAIN;SURGICAL PAIN
TYPE: ACUTE PAIN;SURGICAL PAIN
TYPE: ACUTE PAIN
TYPE: ACUTE PAIN;SURGICAL PAIN
TYPE: ACUTE PAIN
TYPE: ACUTE PAIN
TYPE: ACUTE PAIN;SURGICAL PAIN

## 2021-06-25 ASSESSMENT — GAIT ASSESSMENTS: GAIT LEVEL OF ASSIST: UNABLE TO PARTICIPATE

## 2021-06-25 NOTE — THERAPY
"Physical Therapy   Daily Treatment     Patient Name: Jesus Gorman  Age:  55 y.o., Sex:  male  Medical Record #: [id number]  Today's Date: 6/25/2021     Precautions: Fall Risk, Non Weight Bearing Right Lower Extremity, Non Weight Bearing Right Upper Extremity, Immobilizer Right Lower Extremity, TLSO (Thoracolumbosacral orthosis), Spinal / Back Precautions     Assessment    Pt continues to present w/ decreased functional mobility. Pt w/ very poor sequencing of mobility. He would assist less when he began to have increased pain. Pt going into a heavy posterior push once seated EOB and needed max cues to calm down and focus. Pt then able to hold balance and perform all seated balance tasks. Pt needing max sequencing cues and MaxA to transfer to chair.    Plan    Continue current treatment plan.    DC Equipment Recommendations: Unable to determine at this time  Discharge Recommendations: Recommend post-acute placement for additional physical therapy services prior to discharge home      Subjective    \"Yeah I tried this yesterday I think.\"     Objective       06/25/21 1143   Precautions   Precautions Fall Risk;Non Weight Bearing Right Upper Extremity;Non Weight Bearing Right Lower Extremity;TLSO (Thoracolumbosacral orthosis);Spinal / Back Precautions ;Immobilizer Right Lower Extremity   Comments TLSO EOB   Gait Analysis   Gait Level Of Assist Unable to Participate   Bed Mobility    Supine to Sit Maximal Assist   Sit to Supine   (NT up in chair)   Scooting Maximal Assist   Rolling Maximum Assist to Lt.   Comments Pt w/ very poor sequencing of bed mobility. Upon initial sitting up, very poor attention while screaming out. Heavy posterior push.   Functional Mobility   Sit to Stand Maximal Assist   Bed, Chair, Wheelchair Transfer Maximal Assist   Transfer Method Stand Pivot   Mobility SPT transfer w/ full assist from therapist.   Short Term Goals    Short Term Goal # 1 Pt will perform supine<>sit from flat HOB/no railing with " supervision within 6 visits to ensure independent mobility at home.   Goal Outcome # 1 goal not met   Short Term Goal # 2 Pt will perform sit<>stand with hemiwalker vs crutch with supervision within 6 visits to ensure progression to independence.    Goal Outcome # 2 Goal not met   Short Term Goal # 3 Pt will squat pivot to the left with supervision wihin 6 visits to increase OOB time.    Goal Outcome # 3 Goal not met   Short Term Goal # 4 Pt will ambulate x 50ft with left crutch vs hemiwalker with min A within 6 visits to progress to independence.    Goal Outcome # 4 Goal not met

## 2021-06-25 NOTE — THERAPY
"Speech Language Pathology  Daily Treatment     Patient Name: Jesus Gorman  Age:  55 y.o., Sex:  male  Medical Record #: [id number]  Today's Date: 6/25/2021     Precautions  Precautions: (P) Fall Risk, Non Weight Bearing Right Lower Extremity, Non Weight Bearing Right Upper Extremity, TLSO (Thoracolumbosacral orthosis), Immobilizer Right Lower Extremity  Comments: (P) Odd behavior, irritable, ?psych    Assessment    Pt seen for oral reading and reading compreh further assessment that was recommended during last tx. Pt 8/10 correct oral reading and compreh at the 3-4 sent level using the RCBA. Pt required moderate cues to answer 2/3 questions correctly related to simple prescription. Pt irritable, using profanity, and with decreased toleration for treatment today. Pt demonstrating Rancho IV-V characteristics. Per nurs, pt had been given pain meds and has had a recent BM. Pt stating he had not received pain meds and had not had a BM since admit. Pt demonstrating memory deficits.     Plan  Sustained attention to task, written orientation information, functional reading compreh at the 2-4 sent leve.  See eval for other goals.   Continue current treatment plan.    Discharge Recommendations: (P) Recommend post-acute placement for additional speech therapy services prior to discharge home       06/25/21 1105   Reading Comprehension   Reading Comprehension (WDL) X   Reading Short Paragraphs  Minimal (4)   Skilled Intervention Verbal Cueing   Comments 8/10 correct 3-4 sentence level with RCBA. Decreased compreh with simple prescription.    Patient / Family Goals   Patient / Family Goal #1 \"I want to get better.\"   Goal #1 Outcome Goal not met   Short Term Goals   Short Term Goal # 4 Pt will complete functional reading tasks with min cues and >90% accuracy   Goal Outcome  # 4 Progressing slower than expected         "

## 2021-06-25 NOTE — THERAPY
"Occupational Therapy  Daily Treatment     Patient Name: Jesus Gorman  Age:  55 y.o., Sex:  male  Medical Record #: [id number]  Today's Date: 6/25/2021     Precautions  Precautions: (P) Fall Risk, Non Weight Bearing Right Lower Extremity, Non Weight Bearing Right Upper Extremity, Immobilizer Right Lower Extremity, TLSO (Thoracolumbosacral orthosis), Spinal / Back Precautions   Comments: (P) TLSO at EOB    Assessment    Pt seen for follow up session, pt continues to be limited by multiple NWB extremities, decreased cognition with impulsivity, and poor activity tolerance. Pt willing to transfer to chair but required max verbal cues to maintain NWB restriction and maxA x2 people to complete safe transfer. Will continue to see for skilled therapy as well as recommend post-acute placement.    Plan    Continue current treatment plan.    DC Equipment Recommendations: (P) Unable to determine at this time  Discharge Recommendations: (P) Recommend post-acute placement for additional occupational therapy services prior to discharge home    Subjective    \"Can you go buy me a [employer] I have the money right here\"     Objective       06/25/21 1219   Precautions   Precautions Fall Risk;Non Weight Bearing Right Lower Extremity;Non Weight Bearing Right Upper Extremity;Immobilizer Right Lower Extremity;TLSO (Thoracolumbosacral orthosis);Spinal / Back Precautions    Comments TLSO at EOB   Pain 0 - 10 Group   Therapist Pain Assessment Post Activity Pain Same as Prior to Activity;Nurse Notified   Non Verbal Descriptors   Non Verbal Scale  Calm   Cognition    Cognition / Consciousness X   Level of Consciousness Alert   Ability To Follow Commands 1 Step   New Learning Impaired   Sequencing Impaired   Initiation Impaired   Comments Cooperative, needs constant re-direction, unable to maintain WB restrictions   Other Treatments   Other Treatments Provided Assisted patient in electric razor shaving due to irritation from beard close " "supervision to prevent injury   Balance   Sitting Balance (Static) Fair -   Sitting Balance (Dynamic) Poor +   Standing Balance (Static) Trace +   Standing Balance (Dynamic) Trace   Weight Shift Sitting Poor   Weight Shift Standing Absent   Skilled Intervention Verbal Cuing   Comments unable to maintain NWB RLE   Bed Mobility    Supine to Sit Maximal Assist   Scooting Maximal Assist   Rolling Maximum Assist to Lt.   Skilled Intervention Verbal Cuing   Activities of Daily Living   Grooming Minimal Assist;Seated   Upper Body Dressing Minimal Assist   Lower Body Dressing Total Assist   Toileting   (NT-refused )   Skilled Intervention Verbal Cuing   How much help from another person does the patient currently need...   Putting on and taking off regular lower body clothing? 2   Bathing (including washing, rinsing, and drying)? 2   Toileting, which includes using a toilet, bedpan, or urinal? 1   Putting on and taking off regular upper body clothing? 2   Taking care of personal grooming such as brushing teeth? 2   Eating meals? 3   6 Clicks Daily Activity Score 12   Functional Mobility   Sit to Stand Maximal Assist   Bed, Chair, Wheelchair Transfer Maximal Assist   Transfer Method Stand Pivot   Mobility bed mobility, transfer to chair   Skilled Intervention Compensatory Strategies;Verbal Cuing   Comments w/ HHA   Activity Tolerance   Sitting in Chair left seated in chair   Sitting Edge of Bed 15   Standing 2   Patient / Family Goals   Patient / Family Goal #1 \"To get out of bed\"   Goal #1 Outcome Goal met   Short Term Goals   Short Term Goal # 1 Pt will complete ADL transfers with min A   Goal Outcome # 1 Progressing slower than expected   Short Term Goal # 2 Pt will complete UB dressing with min A using jodi technique    Goal Outcome # 2 Progressing slower than expected   Short Term Goal # 3 Pt will complete seated grooming with supv    Goal Outcome # 3 Progressing as expected   Short Term Goal # 4 Pt will increase R mass " grasp to 100% to incorporate as stabilizer during bimanual functional tasks    Goal Outcome # 4 Goal not met   Education Group   Education Provided Role of Occupational Therapist;Weight Bearing Precautions   Role of Occupational Therapist Patient Response Patient;Acceptance;Explanation;Verbal Demonstration   Weight Bearing Precautions Patient Response Patient;Acceptance;Explanation;Verbal Demonstration;Reinforcement Needed   Interdisciplinary Plan of Care Collaboration   IDT Collaboration with  Nursing   Patient Position at End of Therapy Seated;Chair Alarm On;Call Light within Reach;Tray Table within Reach;Phone within Reach   Collaboration Comments Rn updated

## 2021-06-25 NOTE — CARE PLAN
The patient is Stable - Low risk of patient condition declining or worsening    Shift Goals  Clinical Goals: Pain control  Patient Goals: Pain control  Family Goals: n/a    Progress made toward(s) clinical / shift goals:  Pain medication given (see MAR). Pt educated about non-pharmacological pain control interventions.    Patient is not progressing towards the following goals: none      Problem: Pain - Standard  Goal: Alleviation of pain or a reduction in pain to the patient’s comfort goal  Outcome: Progressing     Problem: Knowledge Deficit - Standard  Goal: Patient and family/care givers will demonstrate understanding of plan of care, disease process/condition, diagnostic tests and medications  Outcome: Progressing     Problem: Skin Integrity  Goal: Skin integrity is maintained or improved  Outcome: Progressing

## 2021-06-25 NOTE — PROGRESS NOTES
"Orthopaedic PA[facility] Progress Note    Author: Chelsea. Date & Time created: 6/25/2021   12:30 PM     Interval Events:  Patient doing well  Dressings CDI  POD#3 S/P L Plateau, R wrist ORIF   POD#10 S/P Retro IMN R femur, L SI screw, R ant iliac spine screw    Review of Systems   Constitutional: Negative.    Cardiovascular: Chest pain: rib fx    Gastrointestinal: Negative.    Musculoskeletal:        Pain well controlled    Neurological: Negative.      Hemodynamics:  /86   Pulse 87   Temp 36.2 °C (97.2 °F) (Temporal)   Resp 17   Ht 1.753 m (5' 9.02\")   Wt 72.4 kg (159 lb 9.8 oz)   SpO2 95%      No Active Precaution Orders    Respiratory:    Respiration: 17, Pulse Oximetry: 95 %     Work Of Breathing / Effort: Within Normal Limits  RUL Breath Sounds: Clear, RML Breath Sounds: Clear, RLL Breath Sounds: Diminished, ROD Breath Sounds: Clear, LLL Breath Sounds: Diminished  Physical Exam   HENT:   Head: Normocephalic and atraumatic.   Pulmonary/Chest: He exhibits tenderness (rib fx ).   Musculoskeletal:      Comments: RUE splint CDI, DNVI, moves all fingers, cap refill <2 sec. RLE and pelvic dressing CDI, DNVI, cap refill <2 sec.      Labs:            Medical Decision Making/Problem List:    Active Hospital Problems    Diagnosis    • Hypotension [I95.9]    • Status post cardiac surgery [Z98.890]    • Respiratory failure following trauma ([facility]) [J96.90]    • Closed fracture of right femur ([facility]) [S72.91XA]    • Closed fracture of distal end of right radius [S52.501A]    • Screening examination for infectious disease [Z11.9]    • Contraindication to deep vein thrombosis (DVT) prophylaxis [Z53.09]    • Eyelid laceration, right, initial encounter [S01.111A]    • Wedge fracture of lumbar vertebra ([facility]) [S32.000A]    • 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  Clearance for lovenox/heparin: per trauma   Weight Bearing Status: NWB RUE and RLE  Wounds & Drains: dressings changed every other day by nursing  Disposition and Follow-up: per therapy recs

## 2021-06-25 NOTE — DISCHARGE PLANNING
"Anticipated Discharge Disposition: SNF     Action: LSW called [facility] member services (#[phone]) to obtain determine if patient had . Patient does not have , but insurance care coordination can be reached at #[phone]. LSW attempted to find out which SNFs were in network through Provider Relations (#[phone]). No answer as voicemail indicated that they were at a \"company event\".      Barriers to Discharge: [employer].     Plan: Follow-up on pending SNF referrals; expand SNF referrals.  "
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
## 2021-06-26 PROCEDURE — A9270 NON-COVERED ITEM OR SERVICE: HCPCS | Performed by: NURSE PRACTITIONER

## 2021-06-26 PROCEDURE — 700101 HCHG RX REV CODE 250: Performed by: NURSE PRACTITIONER

## 2021-06-26 PROCEDURE — 700102 HCHG RX REV CODE 250 W/ 637 OVERRIDE(OP): Performed by: NURSE PRACTITIONER

## 2021-06-26 PROCEDURE — A9270 NON-COVERED ITEM OR SERVICE: HCPCS | Performed by: SURGERY

## 2021-06-26 PROCEDURE — 700111 HCHG RX REV CODE 636 W/ 250 OVERRIDE (IP): Performed by: NURSE PRACTITIONER

## 2021-06-26 PROCEDURE — 700102 HCHG RX REV CODE 250 W/ 637 OVERRIDE(OP): Performed by: SURGERY

## 2021-06-26 PROCEDURE — 770006 HCHG ROOM/CARE - MED/SURG/GYN SEMI*

## 2021-06-26 PROCEDURE — 700102 HCHG RX REV CODE 250 W/ 637 OVERRIDE(OP): Performed by: STUDENT IN AN ORGANIZED HEALTH CARE EDUCATION/TRAINING PROGRAM

## 2021-06-26 PROCEDURE — A9270 NON-COVERED ITEM OR SERVICE: HCPCS | Performed by: STUDENT IN AN ORGANIZED HEALTH CARE EDUCATION/TRAINING PROGRAM

## 2021-06-26 RX ADMIN — GABAPENTIN 300 MG: 300 CAPSULE ORAL at 11:39

## 2021-06-26 RX ADMIN — GABAPENTIN 300 MG: 300 CAPSULE ORAL at 18:07

## 2021-06-26 RX ADMIN — MORPHINE SULFATE 30 MG: 30 TABLET, FILM COATED, EXTENDED RELEASE ORAL at 10:19

## 2021-06-26 RX ADMIN — HYDROMORPHONE HYDROCHLORIDE 4 MG: 2 TABLET ORAL at 06:42

## 2021-06-26 RX ADMIN — HYDROMORPHONE HYDROCHLORIDE 4 MG: 2 TABLET ORAL at 11:38

## 2021-06-26 RX ADMIN — HYDROMORPHONE HYDROCHLORIDE 4 MG: 2 TABLET ORAL at 00:48

## 2021-06-26 RX ADMIN — CEFEPIME 2 G: 2 INJECTION, POWDER, FOR SOLUTION INTRAVENOUS at 21:35

## 2021-06-26 RX ADMIN — HYDROMORPHONE HYDROCHLORIDE 4 MG: 2 TABLET ORAL at 18:07

## 2021-06-26 RX ADMIN — ENOXAPARIN SODIUM 80 MG: 80 INJECTION SUBCUTANEOUS at 18:07

## 2021-06-26 RX ADMIN — FUROSEMIDE 20 MG: 20 TABLET ORAL at 06:42

## 2021-06-26 RX ADMIN — TAMSULOSIN HYDROCHLORIDE 0.8 MG: 0.4 CAPSULE ORAL at 06:41

## 2021-06-26 RX ADMIN — POLYETHYLENE GLYCOL 3350 1 PACKET: 17 POWDER, FOR SOLUTION ORAL at 18:08

## 2021-06-26 RX ADMIN — ENOXAPARIN SODIUM 80 MG: 80 INJECTION SUBCUTANEOUS at 06:43

## 2021-06-26 RX ADMIN — HYDROMORPHONE HYDROCHLORIDE 4 MG: 2 TABLET ORAL at 14:48

## 2021-06-26 RX ADMIN — METAXALONE 800 MG: 800 TABLET ORAL at 06:41

## 2021-06-26 RX ADMIN — FAMOTIDINE 20 MG: 20 TABLET ORAL at 18:07

## 2021-06-26 RX ADMIN — DOCUSATE SODIUM 100 MG: 100 CAPSULE ORAL at 06:42

## 2021-06-26 RX ADMIN — DOCUSATE SODIUM 100 MG: 100 CAPSULE ORAL at 18:07

## 2021-06-26 RX ADMIN — HYDROMORPHONE HYDROCHLORIDE 4 MG: 2 TABLET ORAL at 03:42

## 2021-06-26 RX ADMIN — METAXALONE 800 MG: 800 TABLET ORAL at 11:39

## 2021-06-26 RX ADMIN — CEFEPIME 2 G: 2 INJECTION, POWDER, FOR SOLUTION INTRAVENOUS at 10:19

## 2021-06-26 RX ADMIN — GABAPENTIN 300 MG: 300 CAPSULE ORAL at 06:42

## 2021-06-26 RX ADMIN — HYDROMORPHONE HYDROCHLORIDE 4 MG: 2 TABLET ORAL at 21:25

## 2021-06-26 RX ADMIN — FAMOTIDINE 20 MG: 20 TABLET ORAL at 06:42

## 2021-06-26 RX ADMIN — METAXALONE 800 MG: 800 TABLET ORAL at 18:07

## 2021-06-26 ASSESSMENT — ENCOUNTER SYMPTOMS
MYALGIAS: 1
FEVER: 0
RESPIRATORY NEGATIVE: 1
CONSTIPATION: 0
DIARRHEA: 0
NEUROLOGICAL NEGATIVE: 1
EYES NEGATIVE: 1
ABDOMINAL PAIN: 0
CONSTITUTIONAL NEGATIVE: 1
GASTROINTESTINAL NEGATIVE: 1
VOMITING: 0
CHILLS: 0
PSYCHIATRIC NEGATIVE: 1
CARDIOVASCULAR NEGATIVE: 1
BACK PAIN: 1

## 2021-06-26 ASSESSMENT — PAIN DESCRIPTION - PAIN TYPE
TYPE: ACUTE PAIN
TYPE: ACUTE PAIN;SURGICAL PAIN
TYPE: ACUTE PAIN
TYPE: ACUTE PAIN
TYPE: ACUTE PAIN;SURGICAL PAIN
TYPE: SURGICAL PAIN;ACUTE PAIN
TYPE: ACUTE PAIN;SURGICAL PAIN
TYPE: ACUTE PAIN
TYPE: ACUTE PAIN
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
## 2021-06-26 NOTE — CARE PLAN
The patient is Stable - Low risk of patient condition declining or worsening    Shift Goals  Clinical Goals: pain control, movement  Patient Goals: pain control  Family Goals: n/a    Progress made toward(s) clinical / shift goals:    Problem: Pain - Standard  Goal: Alleviation of pain or a reduction in pain to the patient’s comfort goal  Outcome: Progressing     Problem: Knowledge Deficit - Standard  Goal: Patient and family/care givers will demonstrate understanding of plan of care, disease process/condition, diagnostic tests and medications  Outcome: Progressing     Problem: Skin Integrity  Goal: Skin integrity is maintained or improved  Outcome: Progressing     Problem: Psychosocial  Goal: Patient's level of anxiety will decrease  Outcome: Progressing     Problem: Communication  Goal: The ability to communicate needs accurately and effectively will improve  Outcome: Progressing     Problem: Respiratory  Goal: Patient will achieve/maintain optimum respiratory ventilation and gas exchange  Outcome: Progressing     Problem: Nutrition  Goal: Patient's nutritional and fluid intake will be adequate or improve  Outcome: Progressing     Problem: Self Care  Goal: Patient will have the ability to perform ADLs independently or with assistance (bathe, groom, dress, toilet and feed)  Outcome: Progressing       Patient is not progressing towards the following goals:

## 2021-06-27 LAB
BACTERIA BLD CULT: NORMAL
SIGNIFICANT IND 70042: NORMAL
SITE SITE: NORMAL
SOURCE SOURCE: NORMAL

## 2021-06-27 PROCEDURE — A9270 NON-COVERED ITEM OR SERVICE: HCPCS | Performed by: SURGERY

## 2021-06-27 PROCEDURE — 700105 HCHG RX REV CODE 258: Performed by: NURSE PRACTITIONER

## 2021-06-27 PROCEDURE — A9270 NON-COVERED ITEM OR SERVICE: HCPCS | Performed by: STUDENT IN AN ORGANIZED HEALTH CARE EDUCATION/TRAINING PROGRAM

## 2021-06-27 PROCEDURE — 700101 HCHG RX REV CODE 250: Performed by: NURSE PRACTITIONER

## 2021-06-27 PROCEDURE — A9270 NON-COVERED ITEM OR SERVICE: HCPCS | Performed by: NURSE PRACTITIONER

## 2021-06-27 PROCEDURE — 700102 HCHG RX REV CODE 250 W/ 637 OVERRIDE(OP): Performed by: NURSE PRACTITIONER

## 2021-06-27 PROCEDURE — 700102 HCHG RX REV CODE 250 W/ 637 OVERRIDE(OP): Performed by: SURGERY

## 2021-06-27 PROCEDURE — 770006 HCHG ROOM/CARE - MED/SURG/GYN SEMI*

## 2021-06-27 PROCEDURE — 700111 HCHG RX REV CODE 636 W/ 250 OVERRIDE (IP): Performed by: NURSE PRACTITIONER

## 2021-06-27 PROCEDURE — 700102 HCHG RX REV CODE 250 W/ 637 OVERRIDE(OP): Performed by: STUDENT IN AN ORGANIZED HEALTH CARE EDUCATION/TRAINING PROGRAM

## 2021-06-27 RX ORDER — NALOXONE HYDROCHLORIDE 0.4 MG/ML
INJECTION, SOLUTION INTRAMUSCULAR; INTRAVENOUS; SUBCUTANEOUS
Status: ACTIVE
Start: 2021-06-27 | End: 2021-06-28

## 2021-06-27 RX ORDER — NALOXONE HYDROCHLORIDE 0.4 MG/ML
0.4 INJECTION, SOLUTION INTRAMUSCULAR; INTRAVENOUS; SUBCUTANEOUS PRN
Status: DISCONTINUED | OUTPATIENT
Start: 2021-06-27 | End: 2021-07-01

## 2021-06-27 RX ORDER — SODIUM CHLORIDE 9 MG/ML
500 INJECTION, SOLUTION INTRAVENOUS ONCE
Status: COMPLETED | OUTPATIENT
Start: 2021-06-27 | End: 2021-06-27

## 2021-06-27 RX ADMIN — NALOXONE HYDROCHLORIDE 0.4 MG: 0.4 INJECTION, SOLUTION INTRAMUSCULAR; INTRAVENOUS; SUBCUTANEOUS at 22:12

## 2021-06-27 RX ADMIN — HYDROMORPHONE HYDROCHLORIDE 4 MG: 2 TABLET ORAL at 09:39

## 2021-06-27 RX ADMIN — ENOXAPARIN SODIUM 80 MG: 80 INJECTION SUBCUTANEOUS at 17:38

## 2021-06-27 RX ADMIN — FAMOTIDINE 20 MG: 20 TABLET ORAL at 17:37

## 2021-06-27 RX ADMIN — ENOXAPARIN SODIUM 80 MG: 80 INJECTION SUBCUTANEOUS at 04:58

## 2021-06-27 RX ADMIN — HYDROMORPHONE HYDROCHLORIDE 4 MG: 2 TABLET ORAL at 17:37

## 2021-06-27 RX ADMIN — TAMSULOSIN HYDROCHLORIDE 0.8 MG: 0.4 CAPSULE ORAL at 09:39

## 2021-06-27 RX ADMIN — CEFEPIME 2 G: 2 INJECTION, POWDER, FOR SOLUTION INTRAVENOUS at 23:17

## 2021-06-27 RX ADMIN — GABAPENTIN 300 MG: 300 CAPSULE ORAL at 17:38

## 2021-06-27 RX ADMIN — NALOXONE HYDROCHLORIDE 0.4 MG: 0.4 INJECTION, SOLUTION INTRAMUSCULAR; INTRAVENOUS; SUBCUTANEOUS at 22:30

## 2021-06-27 RX ADMIN — GABAPENTIN 300 MG: 300 CAPSULE ORAL at 11:19

## 2021-06-27 RX ADMIN — POLYETHYLENE GLYCOL 3350 1 PACKET: 17 POWDER, FOR SOLUTION ORAL at 05:04

## 2021-06-27 RX ADMIN — HYDROMORPHONE HYDROCHLORIDE 4 MG: 2 TABLET ORAL at 00:27

## 2021-06-27 RX ADMIN — GABAPENTIN 300 MG: 300 CAPSULE ORAL at 04:58

## 2021-06-27 RX ADMIN — MORPHINE SULFATE 30 MG: 30 TABLET, FILM COATED, EXTENDED RELEASE ORAL at 00:27

## 2021-06-27 RX ADMIN — FUROSEMIDE 20 MG: 20 TABLET ORAL at 04:57

## 2021-06-27 RX ADMIN — CEFEPIME 2 G: 2 INJECTION, POWDER, FOR SOLUTION INTRAVENOUS at 09:39

## 2021-06-27 RX ADMIN — SODIUM CHLORIDE 500 ML: 9 INJECTION, SOLUTION INTRAVENOUS at 22:10

## 2021-06-27 RX ADMIN — METAXALONE 800 MG: 800 TABLET ORAL at 17:38

## 2021-06-27 RX ADMIN — HYDROMORPHONE HYDROCHLORIDE 4 MG: 2 TABLET ORAL at 04:57

## 2021-06-27 RX ADMIN — DOCUSATE SODIUM 100 MG: 100 CAPSULE ORAL at 04:57

## 2021-06-27 RX ADMIN — FAMOTIDINE 20 MG: 20 TABLET ORAL at 04:57

## 2021-06-27 RX ADMIN — METAXALONE 800 MG: 800 TABLET ORAL at 04:58

## 2021-06-27 RX ADMIN — HYDROMORPHONE HYDROCHLORIDE 4 MG: 2 TABLET ORAL at 13:24

## 2021-06-27 RX ADMIN — METAXALONE 800 MG: 800 TABLET ORAL at 11:19

## 2021-06-27 RX ADMIN — MORPHINE SULFATE 30 MG: 30 TABLET, FILM COATED, EXTENDED RELEASE ORAL at 11:19

## 2021-06-27 ASSESSMENT — ENCOUNTER SYMPTOMS
EYES NEGATIVE: 1
ABDOMINAL PAIN: 0
GASTROINTESTINAL NEGATIVE: 1
VOMITING: 0
BACK PAIN: 1
CONSTIPATION: 0
CHILLS: 0
FEVER: 0
MYALGIAS: 1
NEUROLOGICAL NEGATIVE: 1
DIARRHEA: 0
RESPIRATORY NEGATIVE: 1
CONSTITUTIONAL NEGATIVE: 1
PSYCHIATRIC NEGATIVE: 1
CARDIOVASCULAR NEGATIVE: 1

## 2021-06-27 ASSESSMENT — PAIN DESCRIPTION - PAIN TYPE
TYPE: SURGICAL PAIN
TYPE: ACUTE PAIN
TYPE: ACUTE PAIN
TYPE: SURGICAL PAIN
TYPE: ACUTE PAIN
TYPE: SURGICAL PAIN;ACUTE PAIN
TYPE: SURGICAL PAIN
TYPE: SURGICAL PAIN
TYPE: SURGICAL PAIN;ACUTE PAIN
TYPE: ACUTE PAIN
TYPE: SURGICAL PAIN
TYPE: SURGICAL PAIN

## 2021-06-27 NOTE — WOUND TEAM
Wound 06/25/21 Hip Posterior Right (Active)   Wound Image   06/27/21 1100   Site Assessment Yellow;Red;[place] 06/27/21 1100   Periwound Assessment [place] 06/27/21 1100   Margins Defined edges;Attached edges 06/27/21 1100   Closure Secondary intention 06/27/21 1100   Drainage Amount Scant 06/27/21 1100   Drainage Description Serosanguineous 06/27/21 1100   Treatments Cleansed;Site care;Offloading 06/27/21 1100   Wound Cleansing Approved Wound Cleanser 06/27/21 1100   Periwound Protectant Skin Protectant Wipes to Periwound 06/27/21 1100   Dressing Cleansing/Solutions Not Applicable 06/27/21 1100   Dressing Options Hydrocolloid Thin;Mepilex 06/27/21 1100   Dressing Changed New 06/27/21 1100   Dressing Status Clean;Dry;Intact 06/27/21 1100   Dressing Change/Treatment Frequency Every 72 hrs, and As Needed 06/27/21 1100   NEXT Dressing Change/Treatment Date 06/30/21 06/27/21 1100   NEXT Weekly Photo (Inpatient Only) 07/04/21 06/27/21 1100   Non-staged Wound Description Partial thickness 06/27/21 1100   Shape linear 06/27/21 1100   Wound Odor None 06/27/21 1100   Pulses N/A 06/27/21 1100   Exposed Structures None 06/27/21 1100   WOUND NURSE ONLY - Time Spent with Patient (mins) 45 06/27/21 1100      Wound team consulted for right posterior hip.  Patient has a partial thickness wound open above his healed scar.  Slough was adhered to wound bed.  Applied a hydro colloid thin and a mepilex.  Nursing to change dressing Q72h  If wound fails to progress, please re-consult wound team.

## 2021-06-27 NOTE — PROGRESS NOTES
"Orthopaedic PASHASHA Progress Note    Author: Chelsea. Date & Time created: 6/27/2021   12:30 PM     Interval Events:  Patient doing well  Dressings changed by nursing yesterday  POD#5 S/P L Plateau, R wrist ORIF   POD#12 S/P Retro IMN R femur, L SI screw, R ant iliac spine screw    Review of Systems   Constitutional: Negative.    Cardiovascular: Chest pain: rib fx    Gastrointestinal: Negative.    Musculoskeletal:        Pain well controlled    Neurological: Negative.      Hemodynamics:  /67   Pulse 92   Temp 36.3 °C (97.3 °F) (Temporal)   Resp 17   Ht 1.753 m (5' 9.02\")   Wt 72.4 kg (159 lb 9.8 oz)   SpO2 96%      No Active Precaution Orders    Respiratory:    Respiration: 17, Pulse Oximetry: 96 %     Work Of Breathing / Effort: Within Normal Limits  RUL Breath Sounds: Clear, RML Breath Sounds: Clear, RLL Breath Sounds: Diminished, ROD Breath Sounds: Clear, LLL Breath Sounds: Diminished  Physical Exam   HENT:   Head: Normocephalic and atraumatic.   Pulmonary/Chest: He exhibits tenderness (rib fx ).   Musculoskeletal:      Comments: RUE splint CDI, DNVI, moves all fingers, cap refill <2 sec. RLE and pelvic dressing CDI, DNVI, cap refill <2 sec.      Labs:  Recent Labs     06/25/21  0953   WBC 15.9*   RBC 2.87*   HEMOGLOBIN 8.4*   HEMATOCRIT 27.4*   MCV 95.5   MCH 29.3   MCHC 30.7*   RDW 53.0*   PLATELETCT 362   MPV 10.3     Recent Labs     06/25/21  0953   SODIUM 136   POTASSIUM 4.3   CHLORIDE 102   CO2 26   GLUCOSE 113*   BUN 15   CREATININE 0.65   CALCIUM 8.3*       Medical Decision Making/Problem List:    Active Hospital Problems    Diagnosis    • Hypotension [I95.9]    • Status post cardiac surgery [Z98.890]    • Respiratory failure following trauma (HCC) [J96.90]    • Closed fracture of right femur (HCC) [S72.91XA]    • Closed fracture of distal end of right radius [S52.501A]    • Screening examination for infectious disease [Z11.9]    • Contraindication to deep vein thrombosis (DVT) prophylaxis " [Z53.09]    • Eyelid laceration, right, initial encounter [S01.111A]    • Wedge fracture of lumbar vertebra ([facility]) [S32.000A]    • 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  Clearance for lovenox/heparin: per trauma   Weight Bearing Status: NWB RUE and RLE  Wounds & Drains: dressings changed every other day by nursing  Disposition and Follow-up: per therapy recs

## 2021-06-27 NOTE — CARE PLAN
The patient is Stable - Low risk of patient condition declining or worsening    Shift Goals  Clinical Goals: pain control, improved activity tolerance  Patient Goals: Pain control  Family Goals: n/a      Problem: Pain - Standard  Goal: Alleviation of pain or a reduction in pain to the patient’s comfort goal  Outcome: Progressing     Problem: Skin Integrity  Goal: Skin integrity is maintained or improved  Outcome: Progressing       Progress made toward(s) clinical / shift goals:  Prn dilaudid, patient resting in bed.    Patient is not progressing towards the following goals: Patient reluctant to mobilize.

## 2021-06-27 NOTE — PROGRESS NOTES
"Orthopaedic PASHASHA Progress Note    Author: Chelsea. Date & Time created: 6/26/2021   12:30 PM     Interval Events:  Patient doing well  Dressings changed by nursing today  POD#4 S/P L Plateau, R wrist ORIF   POD#11 S/P Retro IMN R femur, L SI screw, R ant iliac spine screw    Review of Systems   Constitutional: Negative.    Cardiovascular: Chest pain: rib fx    Gastrointestinal: Negative.    Musculoskeletal:        Pain well controlled    Neurological: Negative.      Hemodynamics:  /63   Pulse 96   Temp 36.7 °C (98 °F) (Temporal)   Resp 18   Ht 1.753 m (5' 9.02\")   Wt 72.4 kg (159 lb 9.8 oz)   SpO2 95%      No Active Precaution Orders    Respiratory:    Respiration: 18, Pulse Oximetry: 95 %     Work Of Breathing / Effort: Within Normal Limits  RUL Breath Sounds: Clear, RML Breath Sounds: Clear, RLL Breath Sounds: Diminished, ROD Breath Sounds: Clear, LLL Breath Sounds: Diminished  Physical Exam   HENT:   Head: Normocephalic and atraumatic.   Pulmonary/Chest: He exhibits tenderness (rib fx ).   Musculoskeletal:      Comments: RUE splint CDI, DNVI, moves all fingers, cap refill <2 sec. RLE and pelvic dressing CDI, DNVI, cap refill <2 sec.      Labs:  Recent Labs     06/25/21  0953   WBC 15.9*   RBC 2.87*   HEMOGLOBIN 8.4*   HEMATOCRIT 27.4*   MCV 95.5   MCH 29.3   MCHC 30.7*   RDW 53.0*   PLATELETCT 362   MPV 10.3     Recent Labs     06/25/21  0953   SODIUM 136   POTASSIUM 4.3   CHLORIDE 102   CO2 26   GLUCOSE 113*   BUN 15   CREATININE 0.65   CALCIUM 8.3*       Medical Decision Making/Problem List:    Active Hospital Problems    Diagnosis    • Hypotension [I95.9]    • Status post cardiac surgery [Z98.890]    • Respiratory failure following trauma (HCC) [J96.90]    • Closed fracture of right femur (HCC) [S72.91XA]    • Closed fracture of distal end of right radius [S52.501A]    • Screening examination for infectious disease [Z11.9]    • Contraindication to deep vein thrombosis (DVT) prophylaxis [Z53.09] "    • Eyelid laceration, right, initial encounter [S01.111A]    • Wedge fracture of lumbar vertebra ([facility]) [S32.000A]    • 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  Clearance for lovenox/heparin: per trauma   Weight Bearing Status: NWB RUE and RLE  Wounds & Drains: dressings changed every other day by nursing  Disposition and Follow-up: per therapy recs

## 2021-06-27 NOTE — CARE PLAN
The patient is Stable - Low risk of patient condition declining or worsening    Shift Goals  Clinical Goals: sleep comfortably  Patient Goals: pain management  Family Goals: n/a    Progress made toward(s) clinical / shift goals:      Problem: Pain - Standard  Goal: Alleviation of pain or a reduction in pain to the patient’s comfort goal  Outcome: Progressing  Flowsheets  Taken 6/27/2021 0200 by Chrissy Hearn R.N.  Non Verbal Scale:   Calm   Unlabored Breathing   Sleeping  Taken 6/27/2021 0030 by Chrissy Hearn R.N.  Pain Rating Scale (NPRS): 10  Taken 6/15/2021 0800 by Nay Nathan R.N.  Critical-Care Pain Observation Score: 0       Patient is not progressing towards the following goals:      Problem: Communication  Goal: The ability to communicate needs accurately and effectively will improve  Outcome: Not Progressing  Note: Patient sometimes uses call light, sometimes just yells out despite education of call light use.
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
## 2021-06-28 PROBLEM — R93.5 ABNORMAL CT OF THE ABDOMEN: Status: RESOLVED | Noted: 2021-06-14 | Resolved: 2021-06-28

## 2021-06-28 PROCEDURE — 700102 HCHG RX REV CODE 250 W/ 637 OVERRIDE(OP): Performed by: NURSE PRACTITIONER

## 2021-06-28 PROCEDURE — 700102 HCHG RX REV CODE 250 W/ 637 OVERRIDE(OP): Performed by: SURGERY

## 2021-06-28 PROCEDURE — 97535 SELF CARE MNGMENT TRAINING: CPT

## 2021-06-28 PROCEDURE — A9270 NON-COVERED ITEM OR SERVICE: HCPCS | Performed by: NURSE PRACTITIONER

## 2021-06-28 PROCEDURE — 770006 HCHG ROOM/CARE - MED/SURG/GYN SEMI*

## 2021-06-28 PROCEDURE — A9270 NON-COVERED ITEM OR SERVICE: HCPCS | Performed by: SURGERY

## 2021-06-28 PROCEDURE — 700111 HCHG RX REV CODE 636 W/ 250 OVERRIDE (IP): Performed by: NURSE PRACTITIONER

## 2021-06-28 PROCEDURE — 97530 THERAPEUTIC ACTIVITIES: CPT

## 2021-06-28 PROCEDURE — 700101 HCHG RX REV CODE 250: Performed by: NURSE PRACTITIONER

## 2021-06-28 RX ORDER — HYDROMORPHONE HYDROCHLORIDE 2 MG/1
2-4 TABLET ORAL EVERY 4 HOURS PRN
Status: DISCONTINUED | OUTPATIENT
Start: 2021-06-28 | End: 2021-07-04

## 2021-06-28 RX ORDER — METAXALONE 800 MG/1
400 TABLET ORAL 3 TIMES DAILY
Status: DISCONTINUED | OUTPATIENT
Start: 2021-06-28 | End: 2021-07-04

## 2021-06-28 RX ORDER — METAXALONE 800 MG/1
800 TABLET ORAL 3 TIMES DAILY
Status: DISCONTINUED | OUTPATIENT
Start: 2021-06-28 | End: 2021-06-28

## 2021-06-28 RX ADMIN — METAXALONE 800 MG: 800 TABLET ORAL at 10:15

## 2021-06-28 RX ADMIN — POLYETHYLENE GLYCOL 3350 1 PACKET: 17 POWDER, FOR SOLUTION ORAL at 17:51

## 2021-06-28 RX ADMIN — CEFEPIME 2 G: 2 INJECTION, POWDER, FOR SOLUTION INTRAVENOUS at 10:16

## 2021-06-28 RX ADMIN — DOCUSATE SODIUM 100 MG: 100 CAPSULE ORAL at 10:14

## 2021-06-28 RX ADMIN — MORPHINE SULFATE 30 MG: 30 TABLET, FILM COATED, EXTENDED RELEASE ORAL at 11:46

## 2021-06-28 RX ADMIN — METAXALONE 400 MG: 800 TABLET ORAL at 17:48

## 2021-06-28 RX ADMIN — FUROSEMIDE 20 MG: 20 TABLET ORAL at 10:15

## 2021-06-28 RX ADMIN — FAMOTIDINE 20 MG: 20 TABLET ORAL at 10:14

## 2021-06-28 RX ADMIN — RIVAROXABAN 15 MG: 15 TABLET, FILM COATED ORAL at 17:48

## 2021-06-28 RX ADMIN — GABAPENTIN 300 MG: 300 CAPSULE ORAL at 17:48

## 2021-06-28 RX ADMIN — GABAPENTIN 300 MG: 300 CAPSULE ORAL at 10:15

## 2021-06-28 RX ADMIN — HYDROMORPHONE HYDROCHLORIDE 2 MG: 2 TABLET ORAL at 13:42

## 2021-06-28 RX ADMIN — MORPHINE SULFATE 30 MG: 30 TABLET, FILM COATED, EXTENDED RELEASE ORAL at 23:44

## 2021-06-28 RX ADMIN — FAMOTIDINE 20 MG: 20 TABLET ORAL at 17:48

## 2021-06-28 RX ADMIN — HYDROMORPHONE HYDROCHLORIDE 2 MG: 2 TABLET ORAL at 19:12

## 2021-06-28 RX ADMIN — TAMSULOSIN HYDROCHLORIDE 0.8 MG: 0.4 CAPSULE ORAL at 10:14

## 2021-06-28 RX ADMIN — POLYETHYLENE GLYCOL 3350 1 PACKET: 17 POWDER, FOR SOLUTION ORAL at 10:15

## 2021-06-28 RX ADMIN — ENOXAPARIN SODIUM 80 MG: 80 INJECTION SUBCUTANEOUS at 10:16

## 2021-06-28 RX ADMIN — DOCUSATE SODIUM 100 MG: 100 CAPSULE ORAL at 17:48

## 2021-06-28 ASSESSMENT — COGNITIVE AND FUNCTIONAL STATUS - GENERAL
SUGGESTED CMS G CODE MODIFIER MOBILITY: CL
DRESSING REGULAR LOWER BODY CLOTHING: A LOT
WALKING IN HOSPITAL ROOM: TOTAL
PERSONAL GROOMING: A LOT
MOVING TO AND FROM BED TO CHAIR: A LOT
MOBILITY SCORE: 11
TOILETING: TOTAL
DAILY ACTIVITIY SCORE: 12
EATING MEALS: A LITTLE
HELP NEEDED FOR BATHING: A LOT
DRESSING REGULAR UPPER BODY CLOTHING: A LOT
MOVING FROM LYING ON BACK TO SITTING ON SIDE OF FLAT BED: A LOT
TURNING FROM BACK TO SIDE WHILE IN FLAT BAD: A LITTLE
SUGGESTED CMS G CODE MODIFIER DAILY ACTIVITY: CL
STANDING UP FROM CHAIR USING ARMS: A LOT
CLIMB 3 TO 5 STEPS WITH RAILING: TOTAL

## 2021-06-28 ASSESSMENT — ENCOUNTER SYMPTOMS
EYES NEGATIVE: 1
FEVER: 0
DIARRHEA: 0
CONSTIPATION: 0
CONSTITUTIONAL NEGATIVE: 1
CHILLS: 0
RESPIRATORY NEGATIVE: 1
ABDOMINAL PAIN: 0
GASTROINTESTINAL NEGATIVE: 1
CARDIOVASCULAR NEGATIVE: 1
VOMITING: 0
NEUROLOGICAL NEGATIVE: 1
BACK PAIN: 1
PSYCHIATRIC NEGATIVE: 1
MYALGIAS: 1

## 2021-06-28 ASSESSMENT — PAIN DESCRIPTION - PAIN TYPE
TYPE: ACUTE PAIN;SURGICAL PAIN
TYPE: ACUTE PAIN;SURGICAL PAIN

## 2021-06-28 ASSESSMENT — GAIT ASSESSMENTS: GAIT LEVEL OF ASSIST: UNABLE TO PARTICIPATE

## 2021-06-28 NOTE — CARE PLAN
The patient is Stable - Low risk of patient condition declining or worsening    Shift Goals  Clinical Goals: sleep comfortably  Patient Goals: pain management  Family Goals: n/a      Problem: Urinary Elimination  Goal: Establish and maintain regular urinary output  Outcome: Progressing     Problem: Mobility  Goal: Patient's capacity to carry out activities will improve  Outcome: Progressing     Progress made toward(s) clinical / shift goals:  Patient to edge of bed x1 and out of bed to chair for 35 minutes. 2 person assist for pivot transfer, difficulty maintaining NWB to RLE    Patient is not progressing towards the following goals:

## 2021-06-28 NOTE — PROGRESS NOTES
0715 Received report from night CARMEN Leon discussed. Call light in place, bed lowered and locked, bed alarm on/pt calls appropriately. Encourage pt to call if needed anything.

## 2021-06-28 NOTE — DISCHARGE PLANNING
Anticipated Discharge Disposition: SNF    Action: Discussed discharge plan with SARAH Hightower; pt pending SNF acceptance.     Barriers to Discharge:   SNF acceptance  Payor source-[employer]    Plan: Care coordination will follow and assist with discharge plan.

## 2021-06-28 NOTE — PROGRESS NOTES
"Orthopaedic PA-[facility]DE[facility] Progress Note    Author: Chelsea. Date & Time created: 6/28/2021   12:30 PM     Interval Events:  Patient doing well  Dressings changed by nursing  POD#6 S/P L Plateau, R wrist ORIF   POD#13 S/P Retro IMN R femur, L SI screw, R ant iliac spine screw  Plan for staple removal tomorrow above knees    Review of Systems   Constitutional: Negative.    Cardiovascular: Chest pain: rib fx    Gastrointestinal: Negative.    Musculoskeletal:        Pain well controlled    Neurological: Negative.      Hemodynamics:  /58   Pulse 78   Temp 36.4 °C (97.6 °F) (Temporal)   Resp 18   Ht 1.753 m (5' 9.02\")   Wt 72.4 kg (159 lb 9.8 oz)   SpO2 95%      No Active Precaution Orders    Respiratory:    Respiration: 18, Pulse Oximetry: 95 %     Work Of Breathing / Effort: Within Normal Limits  RUL Breath Sounds: Clear;Diminished, RML Breath Sounds: Clear;Diminished, RLL Breath Sounds: Clear;Diminished, ROD Breath Sounds: Clear;Diminished, LLL Breath Sounds: Diminished;Clear  Physical Exam   HENT:   Head: Normocephalic and atraumatic.   Pulmonary/Chest: He exhibits tenderness (rib fx ).   Musculoskeletal:      Comments: RUE splint CDI, DNVI, moves all fingers, cap refill <2 sec. RLE and pelvic dressing CDI, DNVI, cap refill <2 sec.      Labs:            Medical Decision Making/Problem List:    Active Hospital Problems    Diagnosis    • Hypotension [I95.9]    • Status post cardiac surgery [Z98.890]    • Respiratory failure following trauma ([facility]) [J96.90]    • Closed fracture of right femur ([facility]) [S72.91XA]    • Closed fracture of distal end of right radius [S52.501A]    • Screening examination for infectious disease [Z11.9]    • Contraindication to deep vein thrombosis (DVT) prophylaxis [Z53.09]    • Eyelid laceration, right, initial encounter [S01.111A]    • Wedge fracture of lumbar vertebra ([facility]) [S32.000A]    • 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  Clearance for lovenox/heparin: per trauma   Weight Bearing Status: NWB RUE and RLE  Wounds & Drains: dressings changed every other day by nursing  Disposition and Follow-up: per therapy recs

## 2021-06-28 NOTE — THERAPY
Physical Therapy   Daily Treatment     Patient Name: Jesus Gorman  Age:  55 y.o., Sex:  male  Medical Record #: [id number]  Today's Date: 6/28/2021     Precautions: (P) Fall Risk, Non Weight Bearing Right Upper Extremity, Non Weight Bearing Right Lower Extremity, Immobilizer Right Lower Extremity, TLSO (Thoracolumbosacral orthosis), Spinal / Back Precautions     Assessment    Patient remains limited by RLE pain, impulsivity, poor insight, and RLE/UE wbing precautions. Patient req increased time for sup>sit req assist at RLE to move incrementally towards EOB. Once sitting pt with difficulty achieving midline d/t pain. Patient performed x2 STS with FWW with platform attachment and req max cues to adhere to RLE wbing precautions during stand pivot transfer bed<>chair. Patient able to sit in chair at end of session and will continue to benefit from IPPT in house to address impairments.    Plan    Continue current treatment plan.    DC Equipment Recommendations: (P) Unable to determine at this time  Discharge Recommendations: (P) Recommend post-acute placement for additional physical therapy services prior to discharge home     Objective       06/28/21 1220   Gait Analysis   Gait Level Of Assist Unable to Participate   Weight Bearing Status NWB R extremities   Bed Mobility    Supine to Sit Maximal Assist   Sit to Supine   (left sitting in chair)   Scooting Maximal Assist   Rolling Maximum Assist to Lt.   Skilled Intervention Verbal Cuing   Comments poor sequencing   Functional Mobility   Sit to Stand Maximal Assist   Bed, Chair, Wheelchair Transfer Maximal Assist   Transfer Method Stand Pivot   Mobility w/ FWW with platform attachment   Skilled Intervention Compensatory Strategies;Verbal Cuing   Comments use of FWW, max cues for sequening, FWW management and to adhere to RLE NWBing precs   Patient / Family Goals    Patient / Family Goal #1 to improve activity tolerance    Goal #1 Outcome Goal not met   Short Term Goals     Short Term Goal # 1 Pt will perform supine<>sit from flat HOB/no railing with supervision within 6 visits to ensure independent mobility at home.   Goal Outcome # 1 goal not met   Short Term Goal # 2 Pt will perform sit<>stand with hemiwalker vs crutch with supervision within 6 visits to ensure progression to independence.    Goal Outcome # 2 Goal not met   Short Term Goal # 3 Pt will squat pivot to the left with supervision wihin 6 visits to increase OOB time.    Goal Outcome # 3 Goal not met   Short Term Goal # 4 Pt will ambulate x 50ft with left crutch vs hemiwalker with min A within 6 visits to progress to independence.    Goal Outcome # 4 Goal not met   Short Term Goal # 5 Pt will ascend/descend 3 stairs with left UE support and min A within 6 visits to enter/exit home.    Goal Outcome # 5 Goal not met   Anticipated Discharge Equipment and Recommendations   DC Equipment Recommendations Unable to determine at this time   Discharge Recommendations Recommend post-acute placement for additional physical therapy services prior to discharge home
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
## 2021-06-28 NOTE — CARE PLAN
Shift Goals  Clinical Goals: sleep comfortably  Patient Goals: pain management  Family Goals: n/a    Progress made toward(s) clinical / shift goals:  pain assessment q 2 hours, medicated as needed, comfort measures provided.    Patient is not progressing towards the following goals:      Problem: Pain - Standard  Goal: Alleviation of pain or a reduction in pain to the patient’s comfort goal  Outcome: Not Met
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
## 2021-06-28 NOTE — DISCHARGE PLANNING
Agency/Facility Name: [facility]  Spoke To: [facility]  Outcome: Referral was not received.  [facility] gave an alternate fax number, [phone].  [facility] faxed at [phone].    Agency/Facility Name: [facility]  Spoke To: Ebenezer  Outcome: Did not receive the full referral.  [facility] re-faxed at [phone]

## 2021-06-28 NOTE — THERAPY
Occupational Therapy  Daily Treatment     Patient Name: Jesus Gorman  Age:  55 y.o., Sex:  male  Medical Record #: [id number]  Today's Date: 6/28/2021     Precautions: Fall Risk, Non Weight Bearing Right Upper Extremity, Non Weight Bearing Right Lower Extremity, Immobilizer Right Lower Extremity, TLSO (Thoracolumbosacral orthosis), Spinal / Back Precautions   Comments: TLSO EOB and knee immobilizer when OOB    Assessment    Pt progressing slowly with therapy d/t cognitive concerns with self limiting behaviors, poor safety awareness, and impulsivity impacting safety with ADls and functional mobility. Pt required max A to EOB, max A with donning/doffing TLSO and knee brace, max A with LB dressing, and max A with toileting management after a BM. Pt has difficulty adhering to WB precautions d/t cognitive limitations and would continue to benefit from therapy to reinforce WB restrictions in the context of mobility and ADLs. Will continue to follow while in house.    Plan    Continue current treatment plan.    DC Equipment Recommendations: (P) Unable to determine at this time  Discharge Recommendations: (P) Recommend post-acute placement for additional occupational therapy services prior to discharge home       06/28/21 1225   Precautions   Precautions Fall Risk;Non Weight Bearing Right Upper Extremity;Non Weight Bearing Right Lower Extremity;Immobilizer Right Lower Extremity;TLSO (Thoracolumbosacral orthosis);Spinal / Back Precautions    Comments TLSO EOB and knee immobilizer when OOB   Cognition    Cognition / Consciousness X   Ability To Follow Commands 1 Step   Safety Awareness Impaired;Impulsive   New Learning Impaired   Sequencing Impaired   Initiation Impaired   Comments very impulsive requiring max verbal/tactile cues to redirect   Balance   Sitting Balance (Static) Fair -   Sitting Balance (Dynamic) Poor +   Standing Balance (Static) Trace +   Standing Balance (Dynamic) Trace   Weight Shift Sitting Poor   Weight  "Shift Standing Absent   Skilled Intervention Verbal Cuing;Tactile Cuing;Sequencing;Postural Facilitation;Compensatory Strategies   Comments difficulty adhereing to WB precautions   Bed Mobility    Supine to Sit Maximal Assist   Sit to Supine Maximal Assist   Scooting Maximal Assist   Rolling Maximum Assist to Lt.   Activities of Daily Living   Lower Body Dressing Total Assist   Toileting Maximal Assist  (with wiping in standing after BM)   Skilled Intervention Verbal Cuing;Tactile Cuing   How much help from another person does the patient currently need...   Putting on and taking off regular lower body clothing? 2   Bathing (including washing, rinsing, and drying)? 2   Toileting, which includes using a toilet, bedpan, or urinal? 1   Putting on and taking off regular upper body clothing? 2   Taking care of personal grooming such as brushing teeth? 2   Eating meals? 3   6 Clicks Daily Activity Score 12   Functional Mobility   Sit to Stand Maximal Assist   Bed, Chair, Wheelchair Transfer Maximal Assist   Transfer Method Stand Pivot   Mobility Bed mob, EOB mob, stand pivot with platform walker   Comments max cues for safety and sequncing txf techniques   Activity Tolerance   Sitting in Chair up in chair post session   Sitting Edge of Bed 12 min   Standing 5 min   Comments limited by pain, self limiting behavior, cognition   Patient / Family Goals   Patient / Family Goal #1 \"To get out of bed\"   Goal #1 Outcome Progressing as expected   Short Term Goals   Short Term Goal # 1 Pt will complete ADL transfers with min A   Goal Outcome # 1 Goal not met   Short Term Goal # 2 Pt will complete UB dressing with min A using jodi technique    Goal Outcome # 2 Progressing slower than expected   Short Term Goal # 3 Pt will complete seated grooming with supv    Goal Outcome # 3 Progressing slower than expected   Short Term Goal # 4 Pt will increase R mass grasp to 100% to incorporate as stabilizer during bimanual functional tasks  "   Goal Outcome # 4 Progressing slower than expected   Education Group   Role of Occupational Therapist Patient Response Patient;Acceptance;Explanation;Verbal Demonstration   Back Safety Patient Response Patient;Acceptance;Explanation;Demonstration;Reinforcement Needed   Weight Bearing Precautions Patient Response Patient;Acceptance;Explanation;Verbal Demonstration;Reinforcement Needed   Anticipated Discharge Equipment and Recommendations   DC Equipment Recommendations Unable to determine at this time   Discharge Recommendations Recommend post-acute placement for additional occupational therapy services prior to discharge home   Interdisciplinary Plan of Care Collaboration   IDT Collaboration with  Nursing   Patient Position at End of Therapy Seated;Chair Alarm On;Phone within Reach;Tray Table within Reach;Call Light within Reach   Collaboration Comments OT tx and recs   Session Information   Date / Session Number  6/28 4 (2/3, 7/1)   Priority 2

## 2021-06-29 PROBLEM — E87.1 HYPONATREMIA: Status: RESOLVED | Noted: 2021-06-17 | Resolved: 2021-06-29

## 2021-06-29 PROCEDURE — 700102 HCHG RX REV CODE 250 W/ 637 OVERRIDE(OP): Performed by: NURSE PRACTITIONER

## 2021-06-29 PROCEDURE — 700101 HCHG RX REV CODE 250: Performed by: NURSE PRACTITIONER

## 2021-06-29 PROCEDURE — 700102 HCHG RX REV CODE 250 W/ 637 OVERRIDE(OP): Performed by: SURGERY

## 2021-06-29 PROCEDURE — A9270 NON-COVERED ITEM OR SERVICE: HCPCS | Performed by: NURSE PRACTITIONER

## 2021-06-29 PROCEDURE — 770006 HCHG ROOM/CARE - MED/SURG/GYN SEMI*

## 2021-06-29 PROCEDURE — 51798 US URINE CAPACITY MEASURE: CPT

## 2021-06-29 PROCEDURE — A9270 NON-COVERED ITEM OR SERVICE: HCPCS | Performed by: SURGERY

## 2021-06-29 RX ORDER — MORPHINE SULFATE 15 MG/1
15 TABLET, FILM COATED, EXTENDED RELEASE ORAL 3 TIMES DAILY
Status: DISCONTINUED | OUTPATIENT
Start: 2021-06-29 | End: 2021-07-10

## 2021-06-29 RX ADMIN — HYDROMORPHONE HYDROCHLORIDE 2 MG: 2 TABLET ORAL at 23:25

## 2021-06-29 RX ADMIN — TAMSULOSIN HYDROCHLORIDE 0.8 MG: 0.4 CAPSULE ORAL at 06:27

## 2021-06-29 RX ADMIN — GABAPENTIN 300 MG: 300 CAPSULE ORAL at 12:54

## 2021-06-29 RX ADMIN — GABAPENTIN 300 MG: 300 CAPSULE ORAL at 16:38

## 2021-06-29 RX ADMIN — DOCUSATE SODIUM 100 MG: 100 CAPSULE ORAL at 16:38

## 2021-06-29 RX ADMIN — MORPHINE SULFATE 15 MG: 15 TABLET, FILM COATED, EXTENDED RELEASE ORAL at 12:54

## 2021-06-29 RX ADMIN — LIDOCAINE 2 PATCH: 50 PATCH TOPICAL at 12:54

## 2021-06-29 RX ADMIN — RIVAROXABAN 15 MG: 15 TABLET, FILM COATED ORAL at 16:38

## 2021-06-29 RX ADMIN — RIVAROXABAN 15 MG: 15 TABLET, FILM COATED ORAL at 09:54

## 2021-06-29 RX ADMIN — HYDROMORPHONE HYDROCHLORIDE 2 MG: 2 TABLET ORAL at 10:01

## 2021-06-29 RX ADMIN — FAMOTIDINE 20 MG: 20 TABLET ORAL at 06:27

## 2021-06-29 RX ADMIN — FAMOTIDINE 20 MG: 20 TABLET ORAL at 16:38

## 2021-06-29 RX ADMIN — HYDROMORPHONE HYDROCHLORIDE 2 MG: 2 TABLET ORAL at 16:43

## 2021-06-29 RX ADMIN — METAXALONE 400 MG: 800 TABLET ORAL at 16:38

## 2021-06-29 RX ADMIN — GABAPENTIN 300 MG: 300 CAPSULE ORAL at 06:27

## 2021-06-29 RX ADMIN — METAXALONE 400 MG: 800 TABLET ORAL at 12:54

## 2021-06-29 RX ADMIN — METAXALONE 400 MG: 800 TABLET ORAL at 06:26

## 2021-06-29 RX ADMIN — FUROSEMIDE 20 MG: 20 TABLET ORAL at 06:27

## 2021-06-29 RX ADMIN — MORPHINE SULFATE 15 MG: 15 TABLET, FILM COATED, EXTENDED RELEASE ORAL at 16:38

## 2021-06-29 ASSESSMENT — PAIN DESCRIPTION - PAIN TYPE
TYPE: ACUTE PAIN;SURGICAL PAIN

## 2021-06-29 ASSESSMENT — ENCOUNTER SYMPTOMS
CONSTITUTIONAL NEGATIVE: 1
ABDOMINAL PAIN: 0
PSYCHIATRIC NEGATIVE: 1
BACK PAIN: 1
DIARRHEA: 0
EYES NEGATIVE: 1
CONSTIPATION: 0
NEUROLOGICAL NEGATIVE: 1
RESPIRATORY NEGATIVE: 1
CHILLS: 0
VOMITING: 0
GASTROINTESTINAL NEGATIVE: 1
CARDIOVASCULAR NEGATIVE: 1
MYALGIAS: 1
FEVER: 0

## 2021-06-29 NOTE — DISCHARGE PLANNING
"Anticipated Discharge Disposition: SNF    Action: [facility]/U with [facility]. Spoke to [facility], they have declined pt due to \"disruptive behaviors\" and hx of drug abuse.  Called [facility] and left voicemail message with [facility] requesting that they reconsider.     Barriers to Discharge:Pending SNF accept.    Plan: F/U with [facility] in am.  "

## 2021-06-29 NOTE — PROGRESS NOTES
"Orthopaedic [facility] Progress Note    Author: Chelsea. Date & Time created: 6/29/2021   12:30 PM     Interval Events:  Patient doing well  Dressings changed by nursing  POD#7 S/P L Plateau, R wrist ORIF   POD#14 S/P Retro IMN R femur, L SI screw, R ant iliac spine screw  Plan for staple removal today ( above knees )  Leaving splint on RUE    Review of Systems   Constitutional: Negative.    Cardiovascular: Chest pain: rib fx    Gastrointestinal: Negative.    Musculoskeletal:        Pain well controlled    Neurological: Negative.      Hemodynamics:  /58   Pulse 74   Temp 36.1 °C (97 °F) (Temporal)   Resp 17   Ht 1.753 m (5' 9.02\")   Wt 72.4 kg (159 lb 9.8 oz)   SpO2 95%      No Active Precaution Orders    Respiratory:    Respiration: 17, Pulse Oximetry: 95 %        RUL Breath Sounds: Clear, RML Breath Sounds: Diminished, RLL Breath Sounds: Diminished, ROD Breath Sounds: Clear, LLL Breath Sounds: Diminished  Physical Exam   HENT:   Head: Normocephalic and atraumatic.   Pulmonary/Chest: He exhibits tenderness (rib fx ).   Musculoskeletal:      Comments: RUE splint CDI, DNVI, moves all fingers, cap refill <2 sec. RLE and pelvic dressing CDI, DNVI, cap refill <2 sec.      Labs:            Medical Decision Making/Problem List:    Active Hospital Problems    Diagnosis    • Hypotension [I95.9]    • Status post cardiac surgery [Z98.890]    • Respiratory failure following trauma ([facility]) [J96.90]    • Closed fracture of right femur ([facility]) [S72.91XA]    • Closed fracture of distal end of right radius [S52.501A]    • Screening examination for infectious disease [Z11.9]    • Contraindication to deep vein thrombosis (DVT) prophylaxis [Z53.09]    • Eyelid laceration, right, initial encounter [S01.111A]    • Wedge fracture of lumbar vertebra ([facility]) [S32.000A]    • Occlusion of right carotid artery [I65.21]    • Pneumothorax on right [J93.9]    • Multiple pelvic fractures ([facility]) [S32.82XA]    • Closed fracture of multiple ribs " [S22.49XA]    • Abnormal CT of the abdomen [R93.5]    • 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  Clearance for lovenox/heparin: per trauma   Weight Bearing Status: NWB RUE and RLE  Wounds & Drains: dressings changed every other day by nursing  Disposition and Follow-up: per therapy recs

## 2021-06-29 NOTE — PROGRESS NOTES
Report received from Day RN at 1915. Assumed care of patient. Plan of care discussed, questions answered. Assessment completed. VSS, A&O x4, denies chest pain or SOB at this time states pain mainly on right arm and right hip. PRN med given. Call light in reach. Patient educated on use of call light for assistance.    Waffle cushion in place, refused inflation. Refused SCDs.  Hightower catheter in place.  Telesitter in place.

## 2021-06-29 NOTE — CARE PLAN
Problem: Pain - Standard  Goal: Alleviation of pain or a reduction in pain to the patient’s comfort goal  Outcome: Not Met  Note: Educated on pain scale, medicated for pain per MAR.    The patient is Watcher - Medium risk of patient condition declining or worsening    Shift Goals  Clinical Goals: sleep comfortably  Patient Goals: pain management  Family Goals: n/a    Progress made toward(s) clinical / shift goals:  pain control    Patient is not progressing towards the following goals: pain control
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
## 2021-06-29 NOTE — CARE PLAN
Problem: Urinary Elimination  Goal: Establish and maintain regular urinary output  Outcome: Progressing   The patient is Watcher - Medium risk of patient condition declining or worsening    Shift Goals- Reestablish urinary flow post removal of indwelling cathter.  Clinical Goals: monitor urinary output, Remove staples  Patient Goals: pain management  Family Goals: N/A    Progress made toward(s) clinical / shift goals: Hightower dc'd @10 am. No current urine output. Patient requesting to remove staples at a later time today. Will continue to encourage     Patient is not progressing towards the following goals: N/A

## 2021-06-29 NOTE — DISCHARGE PLANNING
Agency/Facility Name: [facility]  Spoke To: Analisa  Outcome: Declined due to location near by.    Agency/Facility Name: [facility]  Spoke To: Amauri  Outcome: Will look at referral then call back.

## 2021-06-30 PROBLEM — R33.9 URINARY RETENTION: Status: ACTIVE | Noted: 2021-06-30

## 2021-06-30 PROCEDURE — A9270 NON-COVERED ITEM OR SERVICE: HCPCS | Performed by: NURSE PRACTITIONER

## 2021-06-30 PROCEDURE — 700102 HCHG RX REV CODE 250 W/ 637 OVERRIDE(OP): Performed by: NURSE PRACTITIONER

## 2021-06-30 PROCEDURE — 770006 HCHG ROOM/CARE - MED/SURG/GYN SEMI*

## 2021-06-30 PROCEDURE — 700101 HCHG RX REV CODE 250: Performed by: NURSE PRACTITIONER

## 2021-06-30 PROCEDURE — 700102 HCHG RX REV CODE 250 W/ 637 OVERRIDE(OP): Performed by: SURGERY

## 2021-06-30 PROCEDURE — A9270 NON-COVERED ITEM OR SERVICE: HCPCS | Performed by: SURGERY

## 2021-06-30 RX ADMIN — DOCUSATE SODIUM 100 MG: 100 CAPSULE ORAL at 16:39

## 2021-06-30 RX ADMIN — TAMSULOSIN HYDROCHLORIDE 0.8 MG: 0.4 CAPSULE ORAL at 06:06

## 2021-06-30 RX ADMIN — MORPHINE SULFATE 15 MG: 15 TABLET, FILM COATED, EXTENDED RELEASE ORAL at 06:06

## 2021-06-30 RX ADMIN — GABAPENTIN 300 MG: 300 CAPSULE ORAL at 06:06

## 2021-06-30 RX ADMIN — METAXALONE 400 MG: 800 TABLET ORAL at 16:38

## 2021-06-30 RX ADMIN — GABAPENTIN 300 MG: 300 CAPSULE ORAL at 16:39

## 2021-06-30 RX ADMIN — HYDROMORPHONE HYDROCHLORIDE 2 MG: 2 TABLET ORAL at 16:38

## 2021-06-30 RX ADMIN — HYDROMORPHONE HYDROCHLORIDE 4 MG: 2 TABLET ORAL at 20:40

## 2021-06-30 RX ADMIN — RIVAROXABAN 15 MG: 15 TABLET, FILM COATED ORAL at 11:31

## 2021-06-30 RX ADMIN — HYDROMORPHONE HYDROCHLORIDE 2 MG: 2 TABLET ORAL at 03:29

## 2021-06-30 RX ADMIN — MORPHINE SULFATE 15 MG: 15 TABLET, FILM COATED, EXTENDED RELEASE ORAL at 11:31

## 2021-06-30 RX ADMIN — METAXALONE 400 MG: 800 TABLET ORAL at 06:06

## 2021-06-30 RX ADMIN — HYDROMORPHONE HYDROCHLORIDE 2 MG: 2 TABLET ORAL at 09:07

## 2021-06-30 RX ADMIN — FUROSEMIDE 20 MG: 20 TABLET ORAL at 06:06

## 2021-06-30 RX ADMIN — FAMOTIDINE 20 MG: 20 TABLET ORAL at 06:06

## 2021-06-30 RX ADMIN — GABAPENTIN 300 MG: 300 CAPSULE ORAL at 11:31

## 2021-06-30 RX ADMIN — FAMOTIDINE 20 MG: 20 TABLET ORAL at 16:38

## 2021-06-30 RX ADMIN — MORPHINE SULFATE 15 MG: 15 TABLET, FILM COATED, EXTENDED RELEASE ORAL at 16:39

## 2021-06-30 RX ADMIN — RIVAROXABAN 15 MG: 15 TABLET, FILM COATED ORAL at 16:38

## 2021-06-30 RX ADMIN — LIDOCAINE 3 PATCH: 50 PATCH TOPICAL at 11:33

## 2021-06-30 RX ADMIN — METAXALONE 400 MG: 800 TABLET ORAL at 11:31

## 2021-06-30 ASSESSMENT — ENCOUNTER SYMPTOMS
VOMITING: 0
MYALGIAS: 1
CONSTIPATION: 0
ABDOMINAL PAIN: 0
CARDIOVASCULAR NEGATIVE: 1
PSYCHIATRIC NEGATIVE: 1
CHILLS: 0
NEUROLOGICAL NEGATIVE: 1
BACK PAIN: 1
DIARRHEA: 0
RESPIRATORY NEGATIVE: 1
FEVER: 0
EYES NEGATIVE: 1

## 2021-06-30 ASSESSMENT — PAIN DESCRIPTION - PAIN TYPE
TYPE: SURGICAL PAIN
TYPE: SURGICAL PAIN
TYPE: ACUTE PAIN;SURGICAL PAIN
TYPE: ACUTE PAIN
TYPE: ACUTE PAIN;SURGICAL PAIN
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
## 2021-06-30 NOTE — PROGRESS NOTES
Report received from Day RN at 1915. Assumed care of patient. Plan of care discussed, questions answered. Assessment completed. VSS, A&O x4, denies chest pain or SOB at this time, states pain on right arm and leg. PRN med given. Call light in reach. Patient educated on use of call light for assistance.    Telesitter in place for safety.   Waffle cushion in place, refused SCDs.   Hightower catheter in place.

## 2021-06-30 NOTE — CARE PLAN
Problem: Pain - Standard  Goal: Alleviation of pain or a reduction in pain to the patient’s comfort goal  Outcome: Progressing   Pt medicated as ordered for pain. Pt educated on pain medication available and 0-10 pain scale.  Problem: Knowledge Deficit - Standard  Goal: Patient and family/care givers will demonstrate understanding of plan of care, disease process/condition, diagnostic tests and medications  Outcome: Progressing     Problem: Respiratory  Goal: Patient will achieve/maintain optimum respiratory ventilation and gas exchange  Outcome: Progressing  Pt in room air. Continuous pulse 02 in room.   The patient is Watcher - Medium risk of patient condition declining or worsening    Shift Goals  Clinical Goals: monitor urinary output, Remove staples  Patient Goals: pain management  Family Goals: N/A    Progress made toward(s) clinical / shift goals: Pt oxygen sat at 94% RA. Pt medicated as ordered per MAR.    Patient is not progressing towards the following goals:

## 2021-06-30 NOTE — DISCHARGE PLANNING
Agency/Facility Name: [facility]  Outcome: Left a message for SIM [facility].  Awaiting a call back.

## 2021-06-30 NOTE — CARE PLAN
Problem: Pain - Standard  Goal: Alleviation of pain or a reduction in pain to the patient’s comfort goal  Outcome: Progressing  Note: Educated on pain scale. Encouraged to verbalize pain level. Medicated for pain per MAR.  Problem: Knowledge Deficit - Standard  Goal: Patient and family/care givers will demonstrate understanding of plan of care, disease process/condition, diagnostic tests and medications  Outcome: Progressing  Note: POC discussed, questions answered. Telesitter in place for safety.    The patient is Watcher - Medium risk of patient condition declining or worsening    Shift Goals  Clinical Goals: monitor urinary output, Remove staples  Patient Goals: pain management  Family Goals: N/A    Progress made toward(s) clinical / shift goals:  pain control, safety (telesitter in place)    Patient is not progressing towards the following goals:

## 2021-07-01 PROCEDURE — 700102 HCHG RX REV CODE 250 W/ 637 OVERRIDE(OP): Performed by: NURSE PRACTITIONER

## 2021-07-01 PROCEDURE — 97112 NEUROMUSCULAR REEDUCATION: CPT

## 2021-07-01 PROCEDURE — 700102 HCHG RX REV CODE 250 W/ 637 OVERRIDE(OP): Performed by: SURGERY

## 2021-07-01 PROCEDURE — A9270 NON-COVERED ITEM OR SERVICE: HCPCS | Performed by: NURSE PRACTITIONER

## 2021-07-01 PROCEDURE — 97530 THERAPEUTIC ACTIVITIES: CPT

## 2021-07-01 PROCEDURE — 770006 HCHG ROOM/CARE - MED/SURG/GYN SEMI*

## 2021-07-01 PROCEDURE — 97535 SELF CARE MNGMENT TRAINING: CPT

## 2021-07-01 PROCEDURE — 700101 HCHG RX REV CODE 250: Performed by: NURSE PRACTITIONER

## 2021-07-01 PROCEDURE — A9270 NON-COVERED ITEM OR SERVICE: HCPCS | Performed by: SURGERY

## 2021-07-01 RX ORDER — NALOXONE HYDROCHLORIDE 0.4 MG/ML
0.4 INJECTION, SOLUTION INTRAMUSCULAR; INTRAVENOUS; SUBCUTANEOUS PRN
Status: DISCONTINUED | OUTPATIENT
Start: 2021-07-01 | End: 2021-07-14 | Stop reason: HOSPADM

## 2021-07-01 RX ORDER — TERAZOSIN 1 MG/1
1 CAPSULE ORAL EVERY EVENING
Status: DISCONTINUED | OUTPATIENT
Start: 2021-07-01 | End: 2021-07-05

## 2021-07-01 RX ORDER — ONDANSETRON 4 MG/1
4 TABLET, ORALLY DISINTEGRATING ORAL EVERY 4 HOURS PRN
Status: DISCONTINUED | OUTPATIENT
Start: 2021-07-01 | End: 2021-07-14 | Stop reason: HOSPADM

## 2021-07-01 RX ADMIN — GABAPENTIN 300 MG: 300 CAPSULE ORAL at 05:07

## 2021-07-01 RX ADMIN — RIVAROXABAN 15 MG: 15 TABLET, FILM COATED ORAL at 09:31

## 2021-07-01 RX ADMIN — MORPHINE SULFATE 15 MG: 15 TABLET, FILM COATED, EXTENDED RELEASE ORAL at 17:57

## 2021-07-01 RX ADMIN — TERAZOSIN HYDROCHLORIDE 1 MG: 1 CAPSULE ORAL at 17:59

## 2021-07-01 RX ADMIN — MORPHINE SULFATE 15 MG: 15 TABLET, FILM COATED, EXTENDED RELEASE ORAL at 05:08

## 2021-07-01 RX ADMIN — MORPHINE SULFATE 15 MG: 15 TABLET, FILM COATED, EXTENDED RELEASE ORAL at 11:25

## 2021-07-01 RX ADMIN — HYDROMORPHONE HYDROCHLORIDE 4 MG: 2 TABLET ORAL at 05:07

## 2021-07-01 RX ADMIN — HYDROMORPHONE HYDROCHLORIDE 4 MG: 2 TABLET ORAL at 00:42

## 2021-07-01 RX ADMIN — HYDROMORPHONE HYDROCHLORIDE 4 MG: 2 TABLET ORAL at 20:21

## 2021-07-01 RX ADMIN — HYDROMORPHONE HYDROCHLORIDE 4 MG: 2 TABLET ORAL at 16:11

## 2021-07-01 RX ADMIN — RIVAROXABAN 15 MG: 15 TABLET, FILM COATED ORAL at 16:11

## 2021-07-01 RX ADMIN — FAMOTIDINE 20 MG: 20 TABLET ORAL at 05:08

## 2021-07-01 RX ADMIN — HYDROMORPHONE HYDROCHLORIDE 4 MG: 2 TABLET ORAL at 09:31

## 2021-07-01 RX ADMIN — METAXALONE 400 MG: 800 TABLET ORAL at 05:08

## 2021-07-01 RX ADMIN — METAXALONE 400 MG: 800 TABLET ORAL at 17:57

## 2021-07-01 RX ADMIN — FAMOTIDINE 20 MG: 20 TABLET ORAL at 16:11

## 2021-07-01 RX ADMIN — TAMSULOSIN HYDROCHLORIDE 0.8 MG: 0.4 CAPSULE ORAL at 05:08

## 2021-07-01 RX ADMIN — METAXALONE 400 MG: 800 TABLET ORAL at 11:25

## 2021-07-01 RX ADMIN — GABAPENTIN 300 MG: 300 CAPSULE ORAL at 16:11

## 2021-07-01 RX ADMIN — FUROSEMIDE 20 MG: 20 TABLET ORAL at 05:08

## 2021-07-01 RX ADMIN — LIDOCAINE 3 PATCH: 50 PATCH TOPICAL at 11:31

## 2021-07-01 RX ADMIN — GABAPENTIN 300 MG: 300 CAPSULE ORAL at 11:25

## 2021-07-01 ASSESSMENT — PAIN DESCRIPTION - PAIN TYPE
TYPE: SURGICAL PAIN

## 2021-07-01 ASSESSMENT — ENCOUNTER SYMPTOMS
RESPIRATORY NEGATIVE: 1
MYALGIAS: 1
GASTROINTESTINAL NEGATIVE: 1
CONSTITUTIONAL NEGATIVE: 1
ROS GI COMMENTS: BM 7/1
NEUROLOGICAL NEGATIVE: 1
PSYCHIATRIC NEGATIVE: 1

## 2021-07-01 ASSESSMENT — COGNITIVE AND FUNCTIONAL STATUS - GENERAL
TOILETING: TOTAL
DRESSING REGULAR UPPER BODY CLOTHING: A LOT
CLIMB 3 TO 5 STEPS WITH RAILING: TOTAL
PERSONAL GROOMING: A LITTLE
WALKING IN HOSPITAL ROOM: TOTAL
MOVING FROM LYING ON BACK TO SITTING ON SIDE OF FLAT BED: UNABLE
SUGGESTED CMS G CODE MODIFIER DAILY ACTIVITY: CL
DRESSING REGULAR LOWER BODY CLOTHING: A LOT
MOBILITY SCORE: 7
MOVING TO AND FROM BED TO CHAIR: UNABLE
DAILY ACTIVITIY SCORE: 13
HELP NEEDED FOR BATHING: A LOT
STANDING UP FROM CHAIR USING ARMS: A LOT
EATING MEALS: A LITTLE
TURNING FROM BACK TO SIDE WHILE IN FLAT BAD: UNABLE
SUGGESTED CMS G CODE MODIFIER MOBILITY: CM

## 2021-07-01 ASSESSMENT — GAIT ASSESSMENTS: GAIT LEVEL OF ASSIST: UNABLE TO PARTICIPATE

## 2021-07-01 NOTE — DISCHARGE PLANNING
[facility] Name[facility]   Outcome: Voicemail from admission, Facility declined Pt, due to care exceeds capacity.

## 2021-07-01 NOTE — CARE PLAN
Problem: Pain - Standard  Goal: Alleviation of pain or a reduction in pain to the patient’s comfort goal  Outcome: Progressing     Problem: Skin Integrity  Goal: Skin integrity is maintained or improved  Outcome: Progressing   The patient is Stable - Low risk of patient condition declining or worsening    Shift Goals  Clinical Goals: pain management  Patient Goals: pain management  Family Goals: N/A

## 2021-07-01 NOTE — PROGRESS NOTES
Received bedside report from night shift nurse. Patient resting in bed, call light and personal belongings within reach, bed in the low and locked position with upper side rails up. Assessment complete, vital signs stable, all needs met at this time. Hourly rounding in place, plan of care discussed with patient, patient verbalized understanding.     telesitter in place
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
## 2021-07-01 NOTE — THERAPY
Occupational Therapy  Daily Treatment     Patient Name: Jesus Gorman  Age:  55 y.o., Sex:  male  Medical Record #: [id number]  Today's Date: 7/1/2021     Precautions  Precautions: Fall Risk, Non Weight Bearing Left Lower Extremity, Non Weight Bearing Right Lower Extremity, Immobilizer Right Lower Extremity, TLSO (Thoracolumbosacral orthosis), Spinal / Back Precautions   Comments: TLSO eob and knee immobilizer when oob     Assessment    Pt seen for OT tx today, reports improved vocalization of being oob, pain control appears improved, with encouragement participated in seated eob h/g tasks, self-feeding and donned hospital gown with min a level after s/u, max a for donning TLSO and knee immobilizer. Pt is limited by pain, wb restrictions needs constant vc's to prevent wb through RUE during mobility, initiated slide board t/f's d/t increased c/o pain with standing and required max a for slide board t/f to bedside recliner. Pt will continue to benefit from acute OT services and post acute placement recommended at this time.      Plan    Continue current treatment plan.    DC Equipment Recommendations: Unable to determine at this time  Discharge Recommendations: Recommend post-acute placement for additional occupational therapy services prior to discharge home     Objective       07/01/21 1153   Cognition    Cognition / Consciousness X   Level of Consciousness Alert   Ability To Follow Commands 1 Step   Safety Awareness Impaired   Sequencing Impaired   Initiation Impaired   Comments following directions appropriately today, fixated on having more medications but then reports w/ movement he's getting better. Needs cues throughout the session to maintain NWB precautions   Active ROM Upper Body   Active ROM Upper Body  X   Comments LUE functional, RUE grossly shoulder functional, elbow through hand in splint    Other Treatments   Other Treatments Provided Pt requires constant vc's to maintain NWB especially through RUE     Balance   Sitting Balance (Static) Fair -   Sitting Balance (Dynamic) Fair -   Weight Shift Sitting Poor   Skilled Intervention Verbal Cuing;Tactile Cuing;Sequencing;Compensatory Strategies   Comments initiated slide board training as pt has difficulties maintaining wb precautions and pain   Bed Mobility    Supine to Sit Maximal Assist   Sit to Supine   (UIC post session)   Scooting Moderate Assist   Skilled Intervention Verbal Cuing;Sequencing;Tactile Cuing;Compensatory Strategies   Comments cues required throughout the session to maintain wb restricitions   Activities of Daily Living   Eating Minimal Assist  (I w/ eating after s/u)   Grooming Minimal Assist;Seated  (oral care )   Upper Body Dressing Maximal Assist  (to don TLSO)   Lower Body Dressing Maximal Assist   Toileting Total Assist  (for pericare post BM)   Skilled Intervention Verbal Cuing;Tactile Cuing;Sequencing;Compensatory Strategies   Functional Mobility   Sit to Stand Maximal Assist   Bed, Chair, Wheelchair Transfer Maximal Assist  (eob<>recliner with SB)   Transfer Method Slide Board   Mobility bed mobility, eob->recliner, STS recliner   Skilled Intervention Verbal Cuing;Tactile Cuing;Sequencing;Facilitation;Compensatory Strategies   Comments required assist for s/u, cues fo sequenicng    Short Term Goals   Short Term Goal # 1 Pt will complete ADL transfers with min A   Goal Outcome # 1 Progressing slower than expected   Short Term Goal # 2 Pt will complete UB dressing with min A using jodi technique    Goal Outcome # 2 Progressing as expected   Short Term Goal # 3 Pt will complete seated grooming with supv    Goal Outcome # 3 Progressing as expected   Short Term Goal # 4 Pt will increase R mass grasp to 100% to incorporate as stabilizer during bimanual functional tasks    Goal Outcome # 4 Progressing as expected   Anticipated Discharge Equipment and Recommendations   DC Equipment Recommendations Unable to determine at this time

## 2021-07-01 NOTE — PROGRESS NOTES
"Orthopaedic Progress Note    Author: Juanjo Logan P.A.-C. Date & Time created: 6/30/2021   8:30 PM     Interval Events:  Patient doing well   Continued med seeking behavior  POD#8 S/P   1.  Open reduction and internal fixation of bicondylar tibial plateau fracture.  2.  Open reduction and internal fixation of right distal radius fracture.  3.  Application of allograft, right wrist  POD#15 S/P   1.  Closed retrograde intramedullary lock nailing right femur   2.  Left percutaneous fluoroscopically guided iliosacral screw placement   3.  Right anterior inferior iliac spine screw for iliac wing fracture      Review of Systems   Constitutional: Negative.    Cardiovascular: Chest pain: rib fx    Gastrointestinal: Negative.    Musculoskeletal:        Pain well controlled    Neurological: Negative.      Hemodynamics:  /72   Pulse 62   Temp 36.4 °C (97.5 °F) (Temporal)   Resp 16   Ht 1.753 m (5' 9.02\")   Wt 72.4 kg (159 lb 9.8 oz)   SpO2 96%      No Active Precaution Orders    Respiratory:    Respiration: 16, Pulse Oximetry: 96 %     Work Of Breathing / Effort: Within Normal Limits  RUL Breath Sounds: Clear, RML Breath Sounds: Diminished, RLL Breath Sounds: Diminished, ROD Breath Sounds: Clear, LLL Breath Sounds: Diminished    Physical Exam   HENT:   Head: Normocephalic and atraumatic.   Pulmonary/Chest: He exhibits tenderness (rib fx ).   Musculoskeletal:      Comments: RUE splint CDI, DNVI, moves all fingers, cap refill <2 sec. RLE and pelvic dressing CDI, DNVI, cap refill <2 sec.      Labs:            Medical Decision Making/Problem List:    Active Hospital Problems    Diagnosis    • Hypotension [I95.9]    • Status post cardiac surgery [Z98.890]    • Respiratory failure following trauma ([facility]) [J96.90]    • Closed fracture of right femur ([facility]) [S72.91XA]    • Closed fracture of distal end of right radius [S52.501A]    • Screening examination for infectious disease [Z11.9]    • Contraindication to deep vein " thrombosis (DVT) prophylaxis [Z53.09]    • Eyelid laceration, right, initial encounter [S01.111A]    • Wedge fracture of lumbar vertebra (HCC) [S32.000A]    • 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  Clearance for lovenox/heparin: per trauma   Weight Bearing Status: NWB RUE and RLE  Wounds & Drains: dressings changed every other day by nursing, splints left in place  Disposition and Follow-up: per therapy recs

## 2021-07-01 NOTE — PROGRESS NOTES
4 Eyes Skin Assessment Completed by HUDSON Reyes and HUDSON Kothari.    Head WDL  Ears WDL  Nose WDL  Mouth WDL  Neck WDL  Breast/Chest WDL  Shoulder Blades WDL  Spine WDL  (R) Arm/Elbow/Hand Scattered abrasions and bruising, swelling  (L) Arm/Elbow/Hand Scattered abrasions and bruisng  Abdomen WDL  Groin WDL  Scrotum/Coccyx/Buttocks Redness and Blanching  (R) Leg Scattered bruising and abrasions, surgical incisions to hip, surgical incision to knee, wound to hip  (L) Leg Blanching redness to medial knee, scattered bruising and abrasions, surgical incision to hip  (R) Heel/Foot/Toe Discoloration, swelling scattered abrasions, flaky and dry  (L) Heel/Foot/Toe Discoloration, swelling scattered abrasions, flaky and dry    Devices In Places Pulse Ox, Hightower and SCD's      Interventions In Place Waffle Overlay, Pillows, Q2 Turns, Barrier Cream, Dri-Rachid Pads and Pressure Redistribution Mattress    Possible Skin Injury No    Pictures Uploaded Into Epic N/A  Wound Consult Placed N/A  RN Wound Prevention Protocol Ordered No

## 2021-07-01 NOTE — DISCHARGE PLANNING
Agency/Facility Name:[facility]   Spoke To: Admission  Outcome: Admission is reviewing referral and will call back later.

## 2021-07-01 NOTE — CARE PLAN
The patient is Stable - Low risk of patient condition declining or worsening    Shift Goals  Clinical Goals: Pain Management and fall safety    Progress made toward(s) clinical / shift goals: Patient able to verbalize pain on 0/10 scale. Pain being controlled with prn pain medication and rest. Bed locked and in lowest position, call light and personal belongings within reach, treaded socks and bed alarm in place, hourly rounding on going.     Patient is not progressing towards the following goals:     Problem: Communication  Goal: The ability to communicate needs accurately and effectively will improve  Outcome: Progressing   Patient uses call light to communicate with staff and verbalize needs. Updated patient on plan of care,  All questions answered. No further questions at this time.

## 2021-07-01 NOTE — THERAPY
Physical Therapy   Daily Treatment     Patient Name: Jesus Gorman  Age:  55 y.o., Sex:  male  Medical Record #: [id number]  Today's Date: 7/1/2021     Precautions: Fall Risk, Non Weight Bearing Right Lower Extremity, Spinal / Back Precautions , TLSO (Thoracolumbosacral orthosis), Immobilizer Right Lower Extremity    Assessment    Pt making very minimal progress, requesting 'dope' so he can transfer/perform functional mobility; given slow progress deferred standing transfers and will now persue seated slide board training; initiated today however still requiring max A to perform; will follow, needs placement.     Plan    Continue current treatment plan.    DC Equipment Recommendations: Unable to determine at this time  Discharge Recommendations:  Recommend post-acute placement for additional physical therapy services prior to discharge home      Abridged Subjective/Objective     07/01/21 1245   Cognition    Cognition / Consciousness X   Level of Consciousness Alert   Reason Unresponsive Other (Comment)   Ability To Follow Commands 1 Step   Safety Awareness Impaired   New Learning Impaired   Sequencing Impaired   Initiation Impaired   Comments declining arousal/command following by end of session, BP stable; reports 'if you give me dope, I can do it'    Sitting Lower Body Exercises   Sitting Lower Body Exercises Yes   Comments a few prior to transfer    Balance   Sitting Balance (Static) Fair   Sitting Balance (Dynamic) Fair -   Weight Shift Sitting Poor   Skilled Intervention Postural Facilitation;Sequencing;Tactile Cuing;Verbal Cuing   Comments able to shift weight for slide board training but painful to the left side; needing max A for weight shifitng;    Gait Analysis   Gait Level Of Assist Unable to Participate   Bed Mobility    Supine to Sit Maximal Assist   Sit to Supine   (Nt, sitting in recliner chair )   Functional Mobility   Sit to Stand Maximal Assist  (really unable to achieve )   Bed, Chair, Wheelchair  Transfer Maximal Assist   Transfer Method Slide Board   Skilled Intervention Sequencing;Postural Facilitation;Tactile Cuing;Verbal Cuing   Patient / Family Goals    Patient / Family Goal #1 to improve activity tolerance    Goal #1 Outcome Goal not met   Short Term Goals    Short Term Goal # 1 Pt will perform supine<>sit from flat HOB/no railing with supervision within 6 visits to ensure independent mobility at home.   Goal Outcome # 1 goal not met   Short Term Goal # 2 Pt will perform sit<>stand with hemiwalker vs crutch with supervision within 6 visits to ensure progression to independence.    Goal Outcome # 2 Goal not met   Short Term Goal # 3 Pt will perform seated slideboard transfer with min A within 6 visits to ensure progression to independence within 6 vistis.    Goal Outcome # 3 Goal not met   Short Term Goal # 4 Pt will self propel w/c x 150ft with left Ue/LE with supervision within 6 visits to ensure progression to independence.    Education Group   Role of Physical Therapist Patient Response Patient;Acceptance;Explanation;Demonstration;Verbal Demonstration;Action Demonstration

## 2021-07-02 PROCEDURE — 700102 HCHG RX REV CODE 250 W/ 637 OVERRIDE(OP): Performed by: NURSE PRACTITIONER

## 2021-07-02 PROCEDURE — 700101 HCHG RX REV CODE 250: Performed by: NURSE PRACTITIONER

## 2021-07-02 PROCEDURE — A9270 NON-COVERED ITEM OR SERVICE: HCPCS | Performed by: NURSE PRACTITIONER

## 2021-07-02 PROCEDURE — A9270 NON-COVERED ITEM OR SERVICE: HCPCS | Performed by: SURGERY

## 2021-07-02 PROCEDURE — 700102 HCHG RX REV CODE 250 W/ 637 OVERRIDE(OP): Performed by: SURGERY

## 2021-07-02 PROCEDURE — 770006 HCHG ROOM/CARE - MED/SURG/GYN SEMI*

## 2021-07-02 RX ORDER — GABAPENTIN 400 MG/1
400 CAPSULE ORAL 3 TIMES DAILY
Status: DISCONTINUED | OUTPATIENT
Start: 2021-07-02 | End: 2021-07-13

## 2021-07-02 RX ADMIN — HYDROMORPHONE HYDROCHLORIDE 4 MG: 2 TABLET ORAL at 10:08

## 2021-07-02 RX ADMIN — MORPHINE SULFATE 15 MG: 15 TABLET, FILM COATED, EXTENDED RELEASE ORAL at 16:46

## 2021-07-02 RX ADMIN — FAMOTIDINE 20 MG: 20 TABLET ORAL at 04:25

## 2021-07-02 RX ADMIN — HYDROMORPHONE HYDROCHLORIDE 4 MG: 2 TABLET ORAL at 00:27

## 2021-07-02 RX ADMIN — HYDROMORPHONE HYDROCHLORIDE 4 MG: 2 TABLET ORAL at 06:08

## 2021-07-02 RX ADMIN — GABAPENTIN 300 MG: 300 CAPSULE ORAL at 04:26

## 2021-07-02 RX ADMIN — LIDOCAINE 2 PATCH: 50 PATCH TOPICAL at 12:36

## 2021-07-02 RX ADMIN — GABAPENTIN 400 MG: 400 CAPSULE ORAL at 12:35

## 2021-07-02 RX ADMIN — FAMOTIDINE 20 MG: 20 TABLET ORAL at 16:47

## 2021-07-02 RX ADMIN — TAMSULOSIN HYDROCHLORIDE 0.8 MG: 0.4 CAPSULE ORAL at 04:25

## 2021-07-02 RX ADMIN — FUROSEMIDE 20 MG: 20 TABLET ORAL at 04:26

## 2021-07-02 RX ADMIN — METAXALONE 400 MG: 800 TABLET ORAL at 12:35

## 2021-07-02 RX ADMIN — METAXALONE 400 MG: 800 TABLET ORAL at 04:25

## 2021-07-02 RX ADMIN — RIVAROXABAN 15 MG: 15 TABLET, FILM COATED ORAL at 16:46

## 2021-07-02 RX ADMIN — METAXALONE 400 MG: 800 TABLET ORAL at 16:45

## 2021-07-02 RX ADMIN — MORPHINE SULFATE 15 MG: 15 TABLET, FILM COATED, EXTENDED RELEASE ORAL at 12:35

## 2021-07-02 RX ADMIN — HYDROMORPHONE HYDROCHLORIDE 4 MG: 2 TABLET ORAL at 20:36

## 2021-07-02 RX ADMIN — GABAPENTIN 400 MG: 400 CAPSULE ORAL at 16:46

## 2021-07-02 RX ADMIN — RIVAROXABAN 15 MG: 15 TABLET, FILM COATED ORAL at 09:02

## 2021-07-02 RX ADMIN — MORPHINE SULFATE 15 MG: 15 TABLET, FILM COATED, EXTENDED RELEASE ORAL at 04:26

## 2021-07-02 RX ADMIN — HYDROMORPHONE HYDROCHLORIDE 4 MG: 2 TABLET ORAL at 16:46

## 2021-07-02 ASSESSMENT — ENCOUNTER SYMPTOMS
NEUROLOGICAL NEGATIVE: 1
PSYCHIATRIC NEGATIVE: 1
RESPIRATORY NEGATIVE: 1
CONSTITUTIONAL NEGATIVE: 1
ROS GI COMMENTS: BM 7/1
GASTROINTESTINAL NEGATIVE: 1
MYALGIAS: 1

## 2021-07-02 ASSESSMENT — PAIN DESCRIPTION - PAIN TYPE
TYPE: ACUTE PAIN;SURGICAL PAIN
TYPE: SURGICAL PAIN
TYPE: ACUTE PAIN;SURGICAL PAIN
TYPE: SURGICAL PAIN
TYPE: SURGICAL PAIN
TYPE: ACUTE PAIN;SURGICAL PAIN
TYPE: ACUTE PAIN;SURGICAL PAIN

## 2021-07-02 NOTE — CARE PLAN
Problem: Pain - Standard  Goal: Alleviation of pain or a reduction in pain to the patient’s comfort goal  Outcome: Progressing   Pt medicated as prescribed.  Problem: Knowledge Deficit - Standard  Goal: Patient and family/care givers will demonstrate understanding of plan of care, disease process/condition, diagnostic tests and medications  Outcome: Progressing     Problem: Skin Integrity  Goal: Skin integrity is maintained or improved  Outcome: Progressing  Skin assessment done. Pt encouraged to reposition himself often.   The patient is Watcher - Medium risk of patient condition declining or worsening    Shift Goals  Clinical Goals: monitor urinary output, Remove staples  Patient Goals: pain management  Family Goals: N/A    Progress made toward(s) clinical / shift goals:  Pt medicated as prescribed. Pt able to reposition himself.    Patient is not progressing towards the following goals:
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
## 2021-07-02 NOTE — CARE PLAN
The patient is Stable - Low risk of patient condition declining or worsening    Shift Goals  Clinical Goals: monitor urinary output, Remove staples  Patient Goals: pain management  Family Goals: N/A    Progress made toward(s) clinical / shift goals:      Problem: Pain - Standard  Goal: Alleviation of pain or a reduction in pain to the patient’s comfort goal  Outcome: Progressing  Note: Pt was experiencing some pain earlier in the night. Pain medication was given as needed and as ordered. Ice and pillows were used to help reduce pain.      Problem: Skin Integrity  Goal: Skin integrity is maintained or improved  Outcome: Progressing  Note: Pt has pillows in use for repositioning. Waffle bed in use.      Problem: Infection - Standard  Goal: Patient will remain free from infection  Outcome: Progressing  Note: Standard precautions in place. Hand hygiene performed before and after pt care. Pt assessed for signs and symptoms of infection.         Patient is not progressing towards the following goals:    N/a

## 2021-07-02 NOTE — PROGRESS NOTES
"Orthopaedic Progress Note    Author: Juanjo Logan P.A.-C. Date & Time created: 7/1/2021   5:12 PM     Interval Events:  Patient doing well   Continued med seeking behavior  POD#9 S/P   1.  Open reduction and internal fixation of bicondylar tibial plateau fracture.  2.  Open reduction and internal fixation of right distal radius fracture.  3.  Application of allograft, right wrist  POD#16 S/P   1.  Closed retrograde intramedullary lock nailing right femur   2.  Left percutaneous fluoroscopically guided iliosacral screw placement   3.  Right anterior inferior iliac spine screw for iliac wing fracture      Review of Systems   Constitutional: Negative.    Cardiovascular: Chest pain: rib fx    Gastrointestinal: Negative.    Musculoskeletal:        Pain well controlled    Neurological: Negative.      Hemodynamics:  /75   Pulse 68   Temp 36.7 °C (98 °F) (Temporal)   Resp 17   Ht 1.753 m (5' 9.02\")   Wt 72.4 kg (159 lb 9.8 oz)   SpO2 96%      No Active Precaution Orders    Respiratory:    Respiration: 17, Pulse Oximetry: 96 %     Work Of Breathing / Effort: Within Normal Limits  RUL Breath Sounds: Clear, RML Breath Sounds: Diminished, RLL Breath Sounds: Diminished, ROD Breath Sounds: Clear, LLL Breath Sounds: Diminished    Physical Exam   HENT:   Head: Normocephalic and atraumatic.   Pulmonary/Chest: He exhibits tenderness (rib fx ).   Musculoskeletal:      Comments: RUE splint CDI, DNVI, moves all fingers, cap refill <2 sec. RLE and pelvic dressing CDI, DNVI, cap refill <2 sec.      Labs:            Medical Decision Making/Problem List:    Active Hospital Problems    Diagnosis    • Hypotension [I95.9]    • Status post cardiac surgery [Z98.890]    • Respiratory failure following trauma (HCC) [J96.90]    • Closed fracture of right femur ([facility]) [S72.91XA]    • Closed fracture of distal end of right radius [S52.501A]    • Screening examination for infectious disease [Z11.9]    • Contraindication to deep vein " thrombosis (DVT) prophylaxis [Z53.09]    • Eyelid laceration, right, initial encounter [S01.111A]    • Wedge fracture of lumbar vertebra (HCC) [S32.000A]    • 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  Clearance for lovenox/heparin: per trauma   Weight Bearing Status: NWB RUE and RLE  Wounds & Drains: dressings changed every other day by nursing, splints left in place  Disposition and Follow-up: per therapy recs

## 2021-07-02 NOTE — PROGRESS NOTES
Spoke with Delia ARRIOLA regarding pt's pain. Dilaudid was given at 2021 (See MAR). Pt was still having severe pain at 2145 in his R arm, so pt was assessed and spoke with Delia ARRIOLA. Updated that pt still has 10/10 pain and that assessment was completed. Pt still has cap refill less than 3 seconds, denies any numbness/tingling, pink in color, warm to touch, and can wiggle fingers and raise arm. Pt was given ice packs and pillows for repositioning arm.

## 2021-07-02 NOTE — PROGRESS NOTES
"Orthopaedic Progress Note    Author: Juanjo Logan P.A.-C. Date & Time created: 7/2/2021   2:35 PM     Interval Events:  Patient doing well   Case discussed with Dr Anne MERCADO splint failing converted to removable short arm splint  POD#10 S/P   1.  Open reduction and internal fixation of bicondylar tibial plateau fracture.  2.  Open reduction and internal fixation of right distal radius fracture.  3.  Application of allograft, right wrist  POD#17 S/P   1.  Closed retrograde intramedullary lock nailing right femur   2.  Left percutaneous fluoroscopically guided iliosacral screw placement   3.  Right anterior inferior iliac spine screw for iliac wing fracture      Review of Systems   Constitutional: Negative.    Cardiovascular: Chest pain: rib fx    Gastrointestinal: Negative.    Musculoskeletal:        Pain well controlled    Neurological: Negative.      Hemodynamics:  /57   Pulse 82   Temp 36.8 °C (98.2 °F) (Temporal)   Resp 16   Ht 1.753 m (5' 9.02\")   Wt 72.4 kg (159 lb 9.8 oz)   SpO2 95%      No Active Precaution Orders    Respiratory:    Respiration: 16, Pulse Oximetry: 95 %     Work Of Breathing / Effort: Within Normal Limits  RUL Breath Sounds: Clear, RML Breath Sounds: Diminished, RLL Breath Sounds: Diminished, ROD Breath Sounds: Clear, LLL Breath Sounds: Diminished    Physical Exam   HENT:   Head: Normocephalic and atraumatic.   Pulmonary/Chest: He exhibits tenderness (rib fx ).   Musculoskeletal:      Comments: RUE splint CDI, DNVI, moves all fingers, cap refill <2 sec. RLE and pelvic dressing CDI, DNVI, cap refill <2 sec.      Labs:            Medical Decision Making/Problem List:    Active Hospital Problems    Diagnosis    • Hypotension [I95.9]    • Status post cardiac surgery [Z98.890]    • Respiratory failure following trauma (HCC) [J96.90]    • Closed fracture of right femur ([facility]) [S72.91XA]    • Closed fracture of distal end of right radius [S52.501A]    • Screening examination for " infectious disease [Z11.9]    • Contraindication to deep vein thrombosis (DVT) prophylaxis [Z53.09]    • Eyelid laceration, right, initial encounter [S01.111A]    • Wedge fracture of lumbar vertebra (HCC) [S32.000A]    • Occlusion of right carotid artery [I65.21]    • Pneumothorax on right [J93.9]    • Multiple pelvic fractures ([facility]) [S32.82XA]    • Closed fracture of multiple ribs [S22.49XA]    • Abnormal CT of the abdomen [R93.5]    • 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  Clearance for lovenox/heparin: per trauma   Weight Bearing Status: NWB RUE and RLE  Wounds & Drains: dressings changed every other day by nursing, splints left in place  Disposition and Follow-up: per therapy recs

## 2021-07-03 PROCEDURE — A9270 NON-COVERED ITEM OR SERVICE: HCPCS | Performed by: NURSE PRACTITIONER

## 2021-07-03 PROCEDURE — 700102 HCHG RX REV CODE 250 W/ 637 OVERRIDE(OP): Performed by: NURSE PRACTITIONER

## 2021-07-03 PROCEDURE — 770006 HCHG ROOM/CARE - MED/SURG/GYN SEMI*

## 2021-07-03 PROCEDURE — 97129 THER IVNTJ 1ST 15 MIN: CPT

## 2021-07-03 PROCEDURE — 700102 HCHG RX REV CODE 250 W/ 637 OVERRIDE(OP): Performed by: SURGERY

## 2021-07-03 PROCEDURE — 700101 HCHG RX REV CODE 250: Performed by: NURSE PRACTITIONER

## 2021-07-03 PROCEDURE — 97130 THER IVNTJ EA ADDL 15 MIN: CPT

## 2021-07-03 PROCEDURE — A9270 NON-COVERED ITEM OR SERVICE: HCPCS | Performed by: SURGERY

## 2021-07-03 RX ADMIN — METAXALONE 400 MG: 800 TABLET ORAL at 04:44

## 2021-07-03 RX ADMIN — FUROSEMIDE 20 MG: 20 TABLET ORAL at 04:44

## 2021-07-03 RX ADMIN — METAXALONE 400 MG: 800 TABLET ORAL at 12:46

## 2021-07-03 RX ADMIN — HYDROMORPHONE HYDROCHLORIDE 4 MG: 2 TABLET ORAL at 12:47

## 2021-07-03 RX ADMIN — FAMOTIDINE 20 MG: 20 TABLET ORAL at 04:43

## 2021-07-03 RX ADMIN — MORPHINE SULFATE 15 MG: 15 TABLET, FILM COATED, EXTENDED RELEASE ORAL at 04:43

## 2021-07-03 RX ADMIN — TAMSULOSIN HYDROCHLORIDE 0.8 MG: 0.4 CAPSULE ORAL at 04:44

## 2021-07-03 RX ADMIN — LIDOCAINE 2 PATCH: 50 PATCH TOPICAL at 12:47

## 2021-07-03 RX ADMIN — HYDROMORPHONE HYDROCHLORIDE 4 MG: 2 TABLET ORAL at 00:44

## 2021-07-03 RX ADMIN — MORPHINE SULFATE 15 MG: 15 TABLET, FILM COATED, EXTENDED RELEASE ORAL at 12:47

## 2021-07-03 RX ADMIN — RIVAROXABAN 15 MG: 15 TABLET, FILM COATED ORAL at 08:43

## 2021-07-03 RX ADMIN — HYDROMORPHONE HYDROCHLORIDE 4 MG: 2 TABLET ORAL at 04:44

## 2021-07-03 RX ADMIN — HYDROMORPHONE HYDROCHLORIDE 4 MG: 2 TABLET ORAL at 22:05

## 2021-07-03 RX ADMIN — GABAPENTIN 400 MG: 400 CAPSULE ORAL at 04:43

## 2021-07-03 RX ADMIN — MORPHINE SULFATE 15 MG: 15 TABLET, FILM COATED, EXTENDED RELEASE ORAL at 18:05

## 2021-07-03 RX ADMIN — FAMOTIDINE 20 MG: 20 TABLET ORAL at 18:05

## 2021-07-03 RX ADMIN — METAXALONE 400 MG: 800 TABLET ORAL at 18:05

## 2021-07-03 RX ADMIN — RIVAROXABAN 15 MG: 15 TABLET, FILM COATED ORAL at 18:06

## 2021-07-03 RX ADMIN — HYDROMORPHONE HYDROCHLORIDE 4 MG: 2 TABLET ORAL at 18:05

## 2021-07-03 RX ADMIN — GABAPENTIN 400 MG: 400 CAPSULE ORAL at 12:46

## 2021-07-03 RX ADMIN — HYDROMORPHONE HYDROCHLORIDE 4 MG: 2 TABLET ORAL at 08:44

## 2021-07-03 RX ADMIN — GABAPENTIN 400 MG: 400 CAPSULE ORAL at 18:05

## 2021-07-03 ASSESSMENT — PAIN DESCRIPTION - PAIN TYPE
TYPE: ACUTE PAIN;SURGICAL PAIN

## 2021-07-03 ASSESSMENT — ENCOUNTER SYMPTOMS
PSYCHIATRIC NEGATIVE: 1
ROS GI COMMENTS: BM 7/1
MYALGIAS: 1
NEUROLOGICAL NEGATIVE: 1
GASTROINTESTINAL NEGATIVE: 1
RESPIRATORY NEGATIVE: 1
CONSTITUTIONAL NEGATIVE: 1

## 2021-07-03 ASSESSMENT — FIBROSIS 4 INDEX: FIB4 SCORE: 1.55

## 2021-07-03 NOTE — THERAPY
"Speech Language Pathology  Daily Treatment     Patient Name: Jesus Gorman  Age:  55 y.o., Sex:  male  Medical Record #: [id number]  Today's Date: 7/3/2021     Precautions  Precautions: (P) Fall Risk, Non Weight Bearing Right Lower Extremity, Spinal / Back Precautions , TLSO (Thoracolumbosacral orthosis), Immobilizer Right Lower Extremity  Comments: TLSO EOB; knee immobilizer right knee;     Assessment    Pt seen at bedside alert and oriented to person, place and year.  Pt completed attention and reading comprehension tasks (at the paragraph level).   Pt required almost constant cuing and/or redirection to remain on task 2' logorrhea. Inappropriate topics and/or language also noted with redirection required from SLP.  With redirection, pt was able to complete visual scanning tasks and reading comprehension task x 2 with 70% accuracy.    Plan    Continue current treatment plan.    Discharge Recommendations: (P) Recommend post-acute placement for additional speech therapy services prior to discharge home       Objective       07/03/21 1012   Cognitive-Linguistic   Level of Consciousness Alert   Sustained Attention Supervision (5)   Alternating Attention Minimal (4)   Divided Attention Minimal (4)   Selective Attention To Be Assessed   Visual Scanning / Cancellation Skills Within Functional Limits (6-7)   Patient / Family Goals   Patient / Family Goal #1 \"I want to get better.\"   Goal #1 Outcome Goal not met   Short Term Goals   Short Term Goal # 1 Pt will be able to generate 15 items in 1 minute in 4/5 trials when given an abstract category.     Goal Outcome # 1 Progressing as expected   Short Term Goal # 2 Pt will be able to complete divided attention tasks with <3 verbal cues in 5 minutes in 2/3 trials   Goal Outcome # 2  Progressing as expected   Short Term Goal # 3 Pt will be able to complete abstract reasoning tasks with min cues and >85% accuracy   Goal Outcome  # 3 Progressing as expected   Short Term Goal # 4 " Pt will complete functional reading tasks with min cues and >90% accuracy   Goal Outcome  # 4 Progressing as expected

## 2021-07-03 NOTE — PROGRESS NOTES
4 Eyes Skin Assessment Completed by HUDSON Be and HUDSON Garland.     Head WDL  Ears WDL  Nose WDL  Mouth WDL  Neck WDL  Breast/Chest WDL  Shoulder Blades WDL  Spine WDL  (R) Arm/Elbow/Hand Scattered abrasions and bruising, splint in place.  (L) Arm/Elbow/Hand Scattered abrasions and bruisng  Abdomen WDL  Groin WDL  Scrotum/Coccyx/Buttocks Redness and Blanching  (R) Leg Scattered bruising and abrasions, surgical incisions to hip, surgical incision to knee, wound to hip  (L) Leg Blanching redness to medial knee, scattered bruising and abrasions, surgical incision to hip  (R) Heel/Foot/Toe Discoloration, swelling scattered abrasions, flaky and dry  (L) Heel/Foot/Toe Discoloration, swelling scattered abrasions, flaky and dry     Devices In Places Pulse Ox, Hightower and SCD's        Interventions In Place Waffle Overlay, Pillows, Q2 Turns, Barrier Cream, Dri-Rachid Pads and Pressure Redistribution Mattress     Possible Skin Injury No     Pictures Uploaded Into Epic N/A  Wound Consult Placed N/A  RN Wound Prevention Protocol Ordered No
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
## 2021-07-03 NOTE — CARE PLAN
Problem: Pain - Standard  Goal: Alleviation of pain or a reduction in pain to the patient’s comfort goal  Outcome: Progressing   Pt medicated as prescribed.    Problem: Knowledge Deficit - Standard  Goal: Patient and family/care givers will demonstrate understanding of plan of care, disease process/condition, diagnostic tests and medications  Outcome: Progressing   Pt educated on POA. All questions answer at this time.  Problem: Skin Integrity  Goal: Skin integrity is maintained or improved  Outcome: Progressing   The patient is Watcher - Medium risk of patient condition declining or worsening    Shift Goals  Clinical Goals: pain control  Patient Goals: pain management  Family Goals: N/A    Progress made toward(s) clinical / shift goals:  Pt medicated per MAR. Pt encouraged to reposition himself. Skin assessed.    Patient is not progressing towards the following goals:

## 2021-07-03 NOTE — PROGRESS NOTES
"0900 Trauma, PA informed regarding patient verbalizing increased pain on RUE. Per patient, \"I feel like I have a blood clot in that arm\". Pt is able to move arm, wiggle finger, cap refill is <3sec. Unable to assess forearm due to splint in place. No new orders received at this time.  "
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
## 2021-07-03 NOTE — CARE PLAN
The patient is Stable - Low risk of patient condition declining or worsening    Shift Goals  Clinical Goals: pain control  Patient Goals: pain management  Family Goals: N/A    Progress made toward(s) clinical / shift goals:  pt resting comfortably in bed a this time, will continue to monitor.     Problem: Pain - Standard  Goal: Alleviation of pain or a reduction in pain to the patient’s comfort goal  Outcome: Progressing     Problem: Knowledge Deficit - Standard  Goal: Patient and family/care givers will demonstrate understanding of plan of care, disease process/condition, diagnostic tests and medications  Outcome: Progressing     Problem: Skin Integrity  Goal: Skin integrity is maintained or improved  Outcome: Progressing     Problem: Psychosocial  Goal: Patient's level of anxiety will decrease  Outcome: Progressing     Problem: Bowel Elimination  Goal: Establish and maintain regular bowel function  Outcome: Progressing

## 2021-07-03 NOTE — PROGRESS NOTES
"Orthopaedic Progress Note    Author: Juanjo Logan P.A.-C. Date & Time created: 7/3/2021   2:56 PM     Interval Events:  Patient doing well   RUE splint in place without issue   POD#11 S/P   1.  Open reduction and internal fixation of bicondylar tibial plateau fracture.  2.  Open reduction and internal fixation of right distal radius fracture.  3.  Application of allograft, right wrist  POD#18 S/P   1.  Closed retrograde intramedullary lock nailing right femur   2.  Left percutaneous fluoroscopically guided iliosacral screw placement   3.  Right anterior inferior iliac spine screw for iliac wing fracture      Review of Systems   Constitutional: Negative.    Cardiovascular: Chest pain: rib fx    Gastrointestinal: Negative.    Musculoskeletal:        Pain well controlled    Neurological: Negative.      Hemodynamics:  BP (!) 98/57   Pulse 84   Temp 36.5 °C (97.7 °F) (Temporal)   Resp 16   Ht 1.753 m (5' 9.02\")   Wt 72.4 kg (159 lb 9.8 oz)   SpO2 95%      No Active Precaution Orders    Respiratory:    Respiration: 16, Pulse Oximetry: 95 %     Work Of Breathing / Effort: Within Normal Limits  RUL Breath Sounds: Clear, RML Breath Sounds: Diminished, RLL Breath Sounds: Diminished, ROD Breath Sounds: Clear, LLL Breath Sounds: Diminished    Physical Exam   HENT:   Head: Normocephalic and atraumatic.   Pulmonary/Chest: He exhibits tenderness (rib fx ).   Musculoskeletal:      Comments: RUE splint CDI, DNVI, moves all fingers, cap refill <2 sec. RLE dressing CDI, DNVI, cap refill <2 sec.      Labs:            Medical Decision Making/Problem List:    Active Hospital Problems    Diagnosis    • Hypotension [I95.9]    • Status post cardiac surgery [Z98.890]    • Respiratory failure following trauma (HCC) [J96.90]    • Closed fracture of right femur ([facility]) [S72.91XA]    • Closed fracture of distal end of right radius [S52.501A]    • Screening examination for infectious disease [Z11.9]    • Contraindication to deep vein " thrombosis (DVT) prophylaxis [Z53.09]    • Eyelid laceration, right, initial encounter [S01.111A]    • Wedge fracture of lumbar vertebra (HCC) [S32.000A]    • 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  Clearance for lovenox/heparin: per trauma   Weight Bearing Status: NWB RUE and RLE  Wounds & Drains: dressings changed every other day by nursing, splints left in place  Disposition and Follow-up: per therapy recs

## 2021-07-04 PROCEDURE — A9270 NON-COVERED ITEM OR SERVICE: HCPCS | Performed by: NURSE PRACTITIONER

## 2021-07-04 PROCEDURE — A9270 NON-COVERED ITEM OR SERVICE: HCPCS | Performed by: SURGERY

## 2021-07-04 PROCEDURE — 700102 HCHG RX REV CODE 250 W/ 637 OVERRIDE(OP): Performed by: NURSE PRACTITIONER

## 2021-07-04 PROCEDURE — 700101 HCHG RX REV CODE 250: Performed by: NURSE PRACTITIONER

## 2021-07-04 PROCEDURE — 770006 HCHG ROOM/CARE - MED/SURG/GYN SEMI*

## 2021-07-04 PROCEDURE — 700102 HCHG RX REV CODE 250 W/ 637 OVERRIDE(OP): Performed by: SURGERY

## 2021-07-04 RX ORDER — HYDROMORPHONE HYDROCHLORIDE 2 MG/1
2-4 TABLET ORAL
Status: DISCONTINUED | OUTPATIENT
Start: 2021-07-04 | End: 2021-07-05

## 2021-07-04 RX ORDER — METAXALONE 800 MG/1
400 TABLET ORAL 3 TIMES DAILY PRN
Status: DISCONTINUED | OUTPATIENT
Start: 2021-07-04 | End: 2021-07-14 | Stop reason: HOSPADM

## 2021-07-04 RX ADMIN — MORPHINE SULFATE 15 MG: 15 TABLET, FILM COATED, EXTENDED RELEASE ORAL at 05:43

## 2021-07-04 RX ADMIN — FAMOTIDINE 20 MG: 20 TABLET ORAL at 05:43

## 2021-07-04 RX ADMIN — GABAPENTIN 400 MG: 400 CAPSULE ORAL at 10:26

## 2021-07-04 RX ADMIN — DOCUSATE SODIUM 100 MG: 100 CAPSULE ORAL at 17:39

## 2021-07-04 RX ADMIN — HYDROMORPHONE HYDROCHLORIDE 4 MG: 2 TABLET ORAL at 16:09

## 2021-07-04 RX ADMIN — HYDROMORPHONE HYDROCHLORIDE 4 MG: 2 TABLET ORAL at 02:56

## 2021-07-04 RX ADMIN — TERAZOSIN HYDROCHLORIDE 1 MG: 1 CAPSULE ORAL at 17:39

## 2021-07-04 RX ADMIN — MORPHINE SULFATE 15 MG: 15 TABLET, FILM COATED, EXTENDED RELEASE ORAL at 10:26

## 2021-07-04 RX ADMIN — METAXALONE 400 MG: 800 TABLET ORAL at 10:25

## 2021-07-04 RX ADMIN — FAMOTIDINE 20 MG: 20 TABLET ORAL at 17:39

## 2021-07-04 RX ADMIN — GABAPENTIN 400 MG: 400 CAPSULE ORAL at 17:39

## 2021-07-04 RX ADMIN — METAXALONE 400 MG: 800 TABLET ORAL at 05:44

## 2021-07-04 RX ADMIN — RIVAROXABAN 15 MG: 15 TABLET, FILM COATED ORAL at 07:43

## 2021-07-04 RX ADMIN — TAMSULOSIN HYDROCHLORIDE 0.8 MG: 0.4 CAPSULE ORAL at 05:44

## 2021-07-04 RX ADMIN — RIVAROXABAN 15 MG: 15 TABLET, FILM COATED ORAL at 17:39

## 2021-07-04 RX ADMIN — FUROSEMIDE 20 MG: 20 TABLET ORAL at 05:43

## 2021-07-04 RX ADMIN — HYDROMORPHONE HYDROCHLORIDE 4 MG: 2 TABLET ORAL at 22:46

## 2021-07-04 RX ADMIN — GABAPENTIN 400 MG: 400 CAPSULE ORAL at 05:43

## 2021-07-04 RX ADMIN — LIDOCAINE 1 PATCH: 50 PATCH TOPICAL at 10:26

## 2021-07-04 RX ADMIN — MORPHINE SULFATE 15 MG: 15 TABLET, FILM COATED, EXTENDED RELEASE ORAL at 17:39

## 2021-07-04 ASSESSMENT — PAIN DESCRIPTION - PAIN TYPE
TYPE: SURGICAL PAIN
TYPE: ACUTE PAIN
TYPE: ACUTE PAIN;SURGICAL PAIN
TYPE: SURGICAL PAIN
TYPE: SURGICAL PAIN

## 2021-07-04 ASSESSMENT — PATIENT HEALTH QUESTIONNAIRE - PHQ9
2. FEELING DOWN, DEPRESSED, IRRITABLE, OR HOPELESS: NOT AT ALL
SUM OF ALL RESPONSES TO PHQ9 QUESTIONS 1 AND 2: 0
1. LITTLE INTEREST OR PLEASURE IN DOING THINGS: NOT AT ALL

## 2021-07-04 ASSESSMENT — ENCOUNTER SYMPTOMS
PSYCHIATRIC NEGATIVE: 1
GASTROINTESTINAL NEGATIVE: 1
NEUROLOGICAL NEGATIVE: 1
MYALGIAS: 1
RESPIRATORY NEGATIVE: 1
ROS GI COMMENTS: BM 7/2
CONSTITUTIONAL NEGATIVE: 1
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
## 2021-07-04 NOTE — CARE PLAN
Problem: Pain - Standard  Goal: Alleviation of pain or a reduction in pain to the patient’s comfort goal  Outcome: Progressing   Pain medication given to pt as required by proper pain scale. Medication alternatives provided to pt.

## 2021-07-04 NOTE — PROGRESS NOTES
"Orthopaedic Progress Note    Author: Juanjo Logan P.A.-C. Date & Time created: 7/35419   4:23 PM     Interval Events:  Patient doing well   RUE splint in place without issue   POD#12 S/P   1.  Open reduction and internal fixation of bicondylar tibial plateau fracture.  2.  Open reduction and internal fixation of right distal radius fracture.  3.  Application of allograft, right wrist  POD#19 S/P   1.  Closed retrograde intramedullary lock nailing right femur   2.  Left percutaneous fluoroscopically guided iliosacral screw placement   3.  Right anterior inferior iliac spine screw for iliac wing fracture      Review of Systems   Constitutional: Negative.    Cardiovascular: Chest pain: rib fx    Gastrointestinal: Negative.    Musculoskeletal:        Pain well controlled    Neurological: Negative.      Hemodynamics:  /63   Pulse 90   Temp 36.1 °C (97 °F) (Temporal)   Resp 16   Ht 1.753 m (5' 9.02\")   Wt 79.6 kg (175 lb 7.8 oz)   SpO2 94%      No Active Precaution Orders    Respiratory:    Respiration: 16, Pulse Oximetry: 94 %     Work Of Breathing / Effort: Within Normal Limits  RUL Breath Sounds: Clear, RML Breath Sounds: Diminished, RLL Breath Sounds: Diminished, ROD Breath Sounds: Clear, LLL Breath Sounds: Diminished    Physical Exam   HENT:   Head: Normocephalic and atraumatic.   Pulmonary/Chest: He exhibits tenderness (rib fx ).   Musculoskeletal:      Comments: RUE splint CDI, DNVI, moves all fingers, cap refill <2 sec. RLE dressing CDI, DNVI, cap refill <2 sec.      Labs:            Medical Decision Making/Problem List:    Active Hospital Problems    Diagnosis    • Hypotension [I95.9]    • Status post cardiac surgery [Z98.890]    • Respiratory failure following trauma (HCC) [J96.90]    • Closed fracture of right femur (LT[facility], located within 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  Clearance for lovenox/heparin: per trauma   Weight Bearing Status: NWB RUE and RLE  Wounds & Drains: dressings changed every other day by nursing, splints left in place  Disposition and Follow-up: per therapy recs

## 2021-07-04 NOTE — CARE PLAN
The patient is Watcher - Medium risk of patient condition declining or worsening    Shift Goals  Clinical Goals: safety  Patient Goals: safety  Family Goals: no family present    Progress made toward(s) clinical / shift goals: YES    Patient is not progressing towards the following goals: N/A    Problem: Safety  Goal: Will remain free from falls  Outcome: PROGRESSING AS EXPECTED   Safety precautions in place.  Telesitter in place, bed alarm on. Call light and personal items within reach. Bed at lowest position and locked.  Siderails up X 2.  Clutter free environment & adequate lighting. Educated on level of risk and reminded to call for assistance.  Hourly rounding in effect.     Problem: Knowledge Deficit  Goal: Knowledge of disease process/condition, treatment plan, diagnostic tests, and medications will improve  Outcome: PROGRESSING AS EXPECTED   Discussed plan of care.  Questions answered.  Verbalized understanding.     Problem: Mobility  Goal: Risk for activity intolerance will decrease  Outcome: PROGRESSING AS EXPECTED   PATIENT REFUSED AMBULATION after multiple attempts. Educated on ROM exercises, risks and benefits.  Verbalized understanding.

## 2021-07-05 PROCEDURE — 700102 HCHG RX REV CODE 250 W/ 637 OVERRIDE(OP): Performed by: NURSE PRACTITIONER

## 2021-07-05 PROCEDURE — 97535 SELF CARE MNGMENT TRAINING: CPT

## 2021-07-05 PROCEDURE — 97112 NEUROMUSCULAR REEDUCATION: CPT

## 2021-07-05 PROCEDURE — A9270 NON-COVERED ITEM OR SERVICE: HCPCS | Performed by: NURSE PRACTITIONER

## 2021-07-05 PROCEDURE — 700101 HCHG RX REV CODE 250: Performed by: NURSE PRACTITIONER

## 2021-07-05 PROCEDURE — 770006 HCHG ROOM/CARE - MED/SURG/GYN SEMI*

## 2021-07-05 PROCEDURE — 97530 THERAPEUTIC ACTIVITIES: CPT

## 2021-07-05 RX ORDER — HYDROMORPHONE HYDROCHLORIDE 2 MG/1
2-4 TABLET ORAL EVERY 6 HOURS PRN
Status: DISCONTINUED | OUTPATIENT
Start: 2021-07-05 | End: 2021-07-12

## 2021-07-05 RX ADMIN — MORPHINE SULFATE 15 MG: 15 TABLET, FILM COATED, EXTENDED RELEASE ORAL at 11:49

## 2021-07-05 RX ADMIN — RIVAROXABAN 15 MG: 15 TABLET, FILM COATED ORAL at 07:16

## 2021-07-05 RX ADMIN — LIDOCAINE 1 PATCH: 50 PATCH TOPICAL at 11:49

## 2021-07-05 RX ADMIN — GABAPENTIN 400 MG: 400 CAPSULE ORAL at 16:59

## 2021-07-05 RX ADMIN — GABAPENTIN 400 MG: 400 CAPSULE ORAL at 11:49

## 2021-07-05 RX ADMIN — HYDROMORPHONE HYDROCHLORIDE 4 MG: 2 TABLET ORAL at 23:44

## 2021-07-05 RX ADMIN — RIVAROXABAN 15 MG: 15 TABLET, FILM COATED ORAL at 16:59

## 2021-07-05 RX ADMIN — GABAPENTIN 400 MG: 400 CAPSULE ORAL at 05:19

## 2021-07-05 RX ADMIN — FAMOTIDINE 20 MG: 20 TABLET ORAL at 16:59

## 2021-07-05 RX ADMIN — MORPHINE SULFATE 15 MG: 15 TABLET, FILM COATED, EXTENDED RELEASE ORAL at 16:59

## 2021-07-05 RX ADMIN — TAMSULOSIN HYDROCHLORIDE 0.8 MG: 0.4 CAPSULE ORAL at 05:38

## 2021-07-05 RX ADMIN — HYDROMORPHONE HYDROCHLORIDE 4 MG: 2 TABLET ORAL at 08:43

## 2021-07-05 RX ADMIN — FAMOTIDINE 20 MG: 20 TABLET ORAL at 05:19

## 2021-07-05 RX ADMIN — MORPHINE SULFATE 15 MG: 15 TABLET, FILM COATED, EXTENDED RELEASE ORAL at 05:19

## 2021-07-05 RX ADMIN — FUROSEMIDE 20 MG: 20 TABLET ORAL at 05:19

## 2021-07-05 ASSESSMENT — COGNITIVE AND FUNCTIONAL STATUS - GENERAL
MOVING TO AND FROM BED TO CHAIR: UNABLE
PERSONAL GROOMING: A LITTLE
DAILY ACTIVITIY SCORE: 14
SUGGESTED CMS G CODE MODIFIER DAILY ACTIVITY: CK
MOVING FROM LYING ON BACK TO SITTING ON SIDE OF FLAT BED: UNABLE
TOILETING: A LOT
WALKING IN HOSPITAL ROOM: TOTAL
HELP NEEDED FOR BATHING: A LOT
STANDING UP FROM CHAIR USING ARMS: A LOT
EATING MEALS: A LITTLE
MOBILITY SCORE: 7
CLIMB 3 TO 5 STEPS WITH RAILING: TOTAL
DRESSING REGULAR LOWER BODY CLOTHING: A LOT
TURNING FROM BACK TO SIDE WHILE IN FLAT BAD: UNABLE
SUGGESTED CMS G CODE MODIFIER MOBILITY: CM
DRESSING REGULAR UPPER BODY CLOTHING: A LOT

## 2021-07-05 ASSESSMENT — PAIN DESCRIPTION - PAIN TYPE
TYPE: ACUTE PAIN
TYPE: ACUTE PAIN;SURGICAL PAIN
TYPE: ACUTE PAIN;SURGICAL PAIN
TYPE: SURGICAL PAIN;ACUTE PAIN
TYPE: SURGICAL PAIN
TYPE: SURGICAL PAIN

## 2021-07-05 ASSESSMENT — GAIT ASSESSMENTS: GAIT LEVEL OF ASSIST: UNABLE TO PARTICIPATE

## 2021-07-05 ASSESSMENT — ENCOUNTER SYMPTOMS
GASTROINTESTINAL NEGATIVE: 1
CONSTITUTIONAL NEGATIVE: 1
PSYCHIATRIC NEGATIVE: 1
NEUROLOGICAL NEGATIVE: 1
RESPIRATORY NEGATIVE: 1
MYALGIAS: 1
ROS GI COMMENTS: BM 7/5

## 2021-07-05 NOTE — CARE PLAN
The patient is Stable - Low risk of patient condition declining or worsening    Progress made toward(s) clinical / shift goals: Patient remains hemodynamically stable and free from falls.  Alert and oriented x4 and cooperative.  Skin interventions in place.    Patient is not progressing towards the following goals:  Complicated/prolonged discharge planning due to patient's care needs and behavioral history.  Patient states pain medications not adequately controlling pain.  Patient continues to have loose incontinent stools.        Problem: Discharge Barriers/Planning  Goal: Patient's continuum of care needs are met  Outcome: Not Progressing    Problem: Pain - Standard  Goal: Alleviation of pain or a reduction in pain to the patient’s comfort goal  Outcome: Not progressing    Problem: Bowel Elimination  Goal: Establish and maintain regular bowel function  Outcome: Not progressing       Problem: Knowledge Deficit - Standard  Goal: Patient and family/care givers will demonstrate understanding of plan of care, disease process/condition, diagnostic tests and medications  Outcome: Progressing     Problem: Skin Integrity  Goal: Skin integrity is maintained or improved  Outcome: Progressing     Problem: Psychosocial  Goal: Patient's level of anxiety will decrease  Outcome: Progressing  Goal: Patient's ability to verbalize feelings about condition will improve  Outcome: Progressing  Goal: Patient's ability to re-evaluate and adapt role responsibilities will improve  Outcome: Progressing  Goal: Patient and family will demonstrate ability to cope with life altering diagnosis and/or procedure  Outcome: Progressing  Goal: Spiritual and cultural needs incorporated into hospitalization  Outcome: Progressing     Problem: Communication  Goal: The ability to communicate needs accurately and effectively will improve  Outcome: Progressing     Problem: Hemodynamics  Goal: Patient's hemodynamics, fluid balance and neurologic status will  be stable or improve  Outcome: Progressing     Problem: Respiratory  Goal: Patient will achieve/maintain optimum respiratory ventilation and gas exchange  Outcome: Progressing     Problem: Fluid Volume  Goal: Fluid volume balance will be maintained  Outcome: Progressing     Problem: Dysphagia  Goal: Dysphagia will improve  Outcome: Progressing     Problem: Risk for Aspiration  Goal: Patient's risk for aspiration will be absent or decrease  Outcome: Progressing     Problem: Nutrition  Goal: Patient's nutritional and fluid intake will be adequate or improve  Outcome: Progressing  Goal: Enteral nutrition will be maintained or improve  Outcome: Progressing  Goal: Enteral nutrition will be maintained or improve  Outcome: Progressing     Problem: Urinary Elimination  Goal: Establish and maintain regular urinary output  Outcome: Progressing     Problem: Gastrointestinal Irritability  Goal: Nausea and vomiting will be absent or improve  Outcome: Progressing  Goal: Diarrhea will be absent or improved  Outcome: Progressing     Problem: Mobility  Goal: Patient's capacity to carry out activities will improve  Outcome: Progressing     Problem: Self Care  Goal: Patient will have the ability to perform ADLs independently or with assistance (bathe, groom, dress, toilet and feed)  Outcome: Progressing     Problem: Infection - Standard  Goal: Patient will remain free from infection  Outcome: Progressing     Problem: Wound/ / Incision Healing  Goal: Patient's wound/surgical incision will decrease in size and heals properly  Outcome: Progressing
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
## 2021-07-05 NOTE — THERAPY
Occupational Therapy  Daily Treatment     Patient Name: Jesus Gorman  Age:  55 y.o., Sex:  male  Medical Record #: [id number]  Today's Date: 7/5/2021     Precautions  Precautions: Fall Risk, Non Weight Bearing Right Lower Extremity, Non Weight Bearing Right Upper Extremity, Immobilizer Right Lower Extremity, TLSO (Thoracolumbosacral orthosis), Spinal / Back Precautions   Comments: TLSO on at EOB, immobilizer R knee, R wrist brace, anxious/emotionally labile    Assessment    Pt seen for OT session. Progressing with AROM of RUE and activity tolerance, but req max v/cs for maintaining NWB RUE/RLE during SB txfs; difficulty sequencing. Emotionally labile throughout req psychosocial intervention. Limited this session sitting up in w/c by low BP and lightheadedness; req return to bed. Continues to be limited by decreased functional mobility, activity tolerance, cognition, sensation, strength, AROM, coordination, balance, adherence to precautions, and pain which are currently affecting pt's ability to complete ADLs/IADLs at baseline. Will continue to follow.     Plan    Continue current treatment plan.    DC Equipment Recommendations: Unable to determine at this time  Discharge Recommendations: Recommend post-acute placement for additional occupational therapy services prior to discharge home     Objective     07/05/21 0859   Precautions   Precautions Fall Risk;Non Weight Bearing Right Lower Extremity;Non Weight Bearing Right Upper Extremity;Immobilizer Right Lower Extremity;TLSO (Thoracolumbosacral orthosis);Spinal / Back Precautions    Comments TLSO on at EOB, immobilizer R knee, R wrist brace, anxious/emotionally labile   Vitals   O2 Delivery Device None - Room Air   Vitals Comments BP dropped while in w/c- BP in sitting w/ c/o lightheadedness 88/67 then 85/67- returned to bed   Pain 0 - 10 Group   Location Knee;Leg   Location Orientation Right   Therapist Pain Assessment Post Activity;During Activity;Nurse  Notified  (not quantified)   Cognition    Cognition / Consciousness X   Level of Consciousness Alert   Ability To Follow Commands 1 Step   Safety Awareness Impaired   New Learning Impaired   Attention Impaired  (tangential req v/cs)   Sequencing Impaired   Initiation Impaired   Comments cooperative, over excited and tangential, then tearful that not able to stay up in chair: emotionally labile.    Passive ROM Upper Body   Passive ROM Upper Body X   Comments R elbow and wrist NT due to long-arm splint   Active ROM Upper Body   Active ROM Upper Body  X   Dominant Hand Right   Comments LUE WFL, RUE limited by brace on wrist, full AROM of fingers   Strength Upper Body   Upper Body Strength  X   Comments LUE WFL; RUE limited by brace req max v/cs for NWB   Sensation Upper Body   Upper Extremity Sensation  X   Comments LUE WFL, RUE improving   Fine Motor / Dexterity    Comments  Has full AROM of fingers, encouraged to continue to use w/o WB   Other Treatments   Other Treatments Provided Educated pt on why important not to overmedicate for safety and importance of continued OOB activity. Req max v/cs for NWB with RUE.   Balance   Sitting Balance (Static) Fair   Sitting Balance (Dynamic) Fair -   Weight Shift Sitting Poor   Skilled Intervention Verbal Cuing;Tactile Cuing;Compensatory Strategies;Facilitation;Sequencing   Comments Req use of UUE support for sitting balance d/t pain. Refused to stand d/t pelvic pain, use of SB   Bed Mobility    Supine to Sit Maximal Assist   Sit to Supine Maximal Assist   Scooting Moderate Assist   Skilled Intervention Verbal Cuing;Tactile Cuing;Facilitation;Compensatory Strategies;Sequencing   Comments req max v/cs and assits for sequencing logroll   Activities of Daily Living   Grooming Minimal Assist;Seated  (oral care; A for BUE task)   Upper Body Dressing Maximal Assist  (TLSO)   Lower Body Dressing Maximal Assist  (socks and immobilizer)   Toileting Minimal Assist  (declined need; min A  "for use of urinal)   Skilled Intervention Tactile Cuing;Verbal Cuing;Facilitation;Compensatory Strategies;Sequencing   How much help from another person does the patient currently need...   Putting on and taking off regular lower body clothing? 2   Bathing (including washing, rinsing, and drying)? 2   Toileting, which includes using a toilet, bedpan, or urinal? 2   Putting on and taking off regular upper body clothing? 2   Taking care of personal grooming such as brushing teeth? 3   Eating meals? 3   6 Clicks Daily Activity Score 14   Functional Mobility   Bed, Chair, Wheelchair Transfer Maximal Assist   Toilet Transfers   (declined need)   Transfer Method Slide Board   Mobility bed>w/c>BTB   Skilled Intervention Verbal Cuing;Compensatory Strategies;Facilitation;Sequencing;Tactile Cuing;Postural Facilitation   Comments Demo'd L sided assist for SB txf using only LUE/LLE, but pt with difficulty sequencing and unable to coordinate   Activity Tolerance   Comments limited by BP and fatigue in chair   Patient / Family Goals   Patient / Family Goal #1 \"To get out of bed\"   Short Term Goals   Short Term Goal # 1 Pt will complete ADL transfers with min A   Goal Outcome # 1 Progressing slower than expected   Short Term Goal # 2 Pt will complete UB dressing with min A using jodi technique    Goal Outcome # 2 Progressing slower than expected   Short Term Goal # 3 Pt will complete seated grooming with supv    Goal Outcome # 3 Progressing as expected   Short Term Goal # 4 Pt will increase R composite flexion/grasp to 100% to incorporate as stabilizer during bimanual functional tasks    Goal Outcome # 4 Progressing as expected   Education Group   Education Provided Weight Bearing Precautions;Activities of Daily Living;Transfers;Back Safety   Back Safety Patient Response Patient;Acceptance;Explanation;Reinforcement Needed;No Learning Evidence   Transfers Patient Response Patient;Acceptance;Explanation;Demonstration;Reinforcement " Needed;No Learning Evidence   ADL Patient Response Patient;Acceptance;Explanation;Verbal Demonstration;Reinforcement Needed   Weight Bearing Precautions Patient Response Patient;Acceptance;Explanation;Demonstration;Reinforcement Needed;No Learning Evidence

## 2021-07-05 NOTE — CARE PLAN
The patient is Watcher - Medium risk of patient condition declining or worsening    Shift Goals  Clinical Goals: increase mobility, safety  Patient Goals: safety   Family Goals: no family present    Progress made toward(s) clinical / shift goals:  YES    Problem: Safety  Goal: Will remain free from falls  Outcome: PROGRESSING AS EXPECTED   Safety precautions in place.  Call light and personal items within reach. Bed at lowest position and locked.  Siderails up X 2.  Clutter free environment & adequate lighting. Educated on level of risk and reminded to call for assistance.  Hourly rounding in effect.     Problem: Mobility  Goal: Risk for activity intolerance will decrease  Outcome: PROGRESSING AS EXPECTED   Encouraged to increase mobility. Educated on ROM exercises, risks and benefits.  Verbalized understanding.

## 2021-07-05 NOTE — PROGRESS NOTES
"Orthopaedic Progress Note    Author: Juanjo Logan P.A.-C. Date & Time created: 7/5/2021   3:47 PM     Interval Events:  Patient doing well   RUE splint in place without issue   POD#13 S/P   1.  Open reduction and internal fixation of bicondylar tibial plateau fracture.  2.  Open reduction and internal fixation of right distal radius fracture.  3.  Application of allograft, right wrist  POD#20 S/P   1.  Closed retrograde intramedullary lock nailing right femur   2.  Left percutaneous fluoroscopically guided iliosacral screw placement   3.  Right anterior inferior iliac spine screw for iliac wing fracture      Review of Systems   Constitutional: Negative.    Cardiovascular: Chest pain: rib fx    Gastrointestinal: Negative.    Musculoskeletal:        Pain well controlled    Neurological: Negative.      Hemodynamics:  /69   Pulse 85   Temp 36.8 °C (98.3 °F) (Temporal)   Resp 17   Ht 1.753 m (5' 9.02\")   Wt 79.6 kg (175 lb 7.8 oz)   SpO2 98%      No Active Precaution Orders    Respiratory:    Respiration: 17, Pulse Oximetry: 98 %     Work Of Breathing / Effort: Within Normal Limits  RUL Breath Sounds: Clear, RML Breath Sounds: Diminished, RLL Breath Sounds: Diminished, ROD Breath Sounds: Clear, LLL Breath Sounds: Diminished    Physical Exam   HENT:   Head: Normocephalic and atraumatic.   Pulmonary/Chest: He exhibits tenderness (rib fx ).   Musculoskeletal:      Comments: RUE splint CDI, DNVI, moves all fingers, cap refill <2 sec. RLE dressing CDI, DNVI, cap refill <2 sec.      Labs:            Medical Decision Making/Problem List:    Active Hospital Problems    Diagnosis    • Hypotension [I95.9]    • Status post cardiac surgery [Z98.890]    • Respiratory failure following trauma ([facility]) [J96.90]    • Closed fracture of right femur ([facility]) [S72.91XA]    • Closed fracture of distal end of right radius [S52.501A]    • Screening examination for infectious disease [Z11.9]    • Contraindication to deep vein " thrombosis (DVT) prophylaxis [Z53.09]    • Eyelid laceration, right, initial encounter [S01.111A]    • Wedge fracture of lumbar vertebra (HCC) [S32.000A]    • 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  Clearance for lovenox/heparin: per trauma   Weight Bearing Status: NWB RUE and RLE  Wounds & Drains: dressings changed every other day by nursing, splints left in place  Disposition and Follow-up: per therapy recs

## 2021-07-05 NOTE — THERAPY
Physical Therapy   Daily Treatment     Patient Name: Jesus Gorman  Age:  55 y.o., Sex:  male  Medical Record #: [id number]  Today's Date: 7/5/2021     Precautions: Fall Risk, Non Weight Bearing Right Lower Extremity, Non Weight Bearing Right Upper Extremity, Immobilizer Right Lower Extremity, TLSO (Thoracolumbosacral orthosis), Spinal / Back Precautions     Assessment  Pt with slow progress towards therapy goals with improvements in mobility and activity tolerance, but continued limitations from pain, fatigue, and anxiety. Pt requires MAX x2 for bed mobility and slide board transfers bed<>w/c<>bed with HEAVY constant cueing for NWB on RUE/RLE with physical assist for RLE to maintain. Pt with poor orientation and participation with one step commands and repeated verbal/tactile cues. Heavy review/education given for WB restrictions and spinal precautions and donning/doffing immobilizer and TLSO. Pt with poor adherence to all precautions despite cueing and often required complete restraint by therapist to avoid WB. Pt with posterior lean and needing multiple cues for posture and balance in unsupported sitting and heavy cues for weight shift for slide board and transfer. Pt with difficulty processing and sequencing and requires MAX x 2 for all. BP 88/67 after 2nd w/c transfer then dropped to 65/49 with pt reporting dizziness and becoming less responsive. Pt returned to bed and symptoms resolved and nsg notified. PT will progress POC as tolerated.     Plan    Continue current treatment plan.    DC Equipment Recommendations: Unable to determine at this time  Discharge Recommendations:  Recommend post-acute placement for additional physical therapy services prior to discharge home      Subjective  Pt urinating upon arrival and agreeable to therapy, reporting mild pain at rest and high levels with mobility.      Objective  Pt requires MAX x2 for bed mobility and slide board transfers bed<>w/c<>bed with HEAVY constant cueing  for NWB on RUE/RLE with physical assist for RLE to maintain. Pt with poor orientation and participation with one step commands and repeated verbal/tactile cues. Slide board transfer bed>w/c, w/c>w/c, then w/c>bed after BP dropped 65/49.      07/05/21 1006   Total Time Spent   Total Time Spent (Mins) 70   Charge Group   Charges  Yes   PT Therapeutic Activities 3   PT Neuromuscular Re-Education / Balance 1   PT Self Care / Home Evaluation  1   Precautions   Precautions Fall Risk;Non Weight Bearing Right Lower Extremity;Non Weight Bearing Right Upper Extremity;Immobilizer Right Lower Extremity;TLSO (Thoracolumbosacral orthosis);Spinal / Back Precautions    Comments TLSO EOB, immobilizer R knee, anxious/emotionally labile   Pain 0 - 10 Group   Therapist Pain Assessment During Activity;Post Activity  (not quantified but high levels reported during mobility)   Non Verbal Descriptors   Non Verbal Scale  Calm;Restlessness   Cognition    Level of Consciousness Alert   Ability To Follow Commands 1 Step   Comments Distracted, talkative, emotionally labile   Neuro-Muscular Treatments   Neuro-Muscular Treatments Anterior weight shift;Postural Facilitation;Verbal Cuing;Weight Shift Right;Weight Shift Left;Sequencing;Tactile Cuing   Other Treatments   Other Treatments Provided Pt requires constant vc's to maintain NWB especially through RUE    Balance   Sitting Balance (Static) Fair   Sitting Balance (Dynamic) Fair -   Weight Shift Sitting Poor   Skilled Intervention Postural Facilitation;Sequencing;Tactile Cuing;Verbal Cuing   Comments Did not stand, slide board only   Gait Analysis   Gait Level Of Assist Unable to Participate   Comments Cannot maintain WB restrictions to attempt stand   Bed Mobility    Supine to Sit Maximal Assist   Sit to Supine Maximal Assist   Scooting Moderate Assist   Rolling Maximal Assist to Rt.;Maximum Assist to Lt.   Skilled Intervention Verbal Cuing;Tactile Cuing;Sequencing   Comments Cues throughout,  cannot focus   Functional Mobility   Bed, Chair, Wheelchair Transfer Maximal Assist  (slide board only)   Transfer Method Slide Board   Mobility bed<>w/c<>w/c<>bed   Skilled Intervention Postural Facilitation;Sequencing;Tactile Cuing;Verbal Cuing   Comments Cueing throughout, assist with RLE NWB   How much difficulty does the patient currently have...   Turning over in bed (including adjusting bedclothes, sheets and blankets)? 1   Sitting down on and standing up from a chair with arms (e.g., wheelchair, bedside commode, etc.) 1   Moving from lying on back to sitting on the side of the bed? 1   How much help from another person does the patient currently need...   Moving to and from a bed to a chair (including a wheelchair)? 2   Need to walk in a hospital room? 1   Climbing 3-5 steps with a railing? 1   6 clicks Mobility Score 7   Activity Tolerance   Sitting in Chair >30   Sitting Edge of Bed >15   Standing NT   Comments Pain, fatigue, anxiety   Patient / Family Goals    Patient / Family Goal #1 to improve activity tolerance    Goal #1 Outcome Goal not met   Short Term Goals    Short Term Goal # 1 Pt will perform supine<>sit from flat HOB/no railing with supervision within 6 visits to ensure independent mobility at home.   Goal Outcome # 1 goal not met   Short Term Goal # 2 Pt will perform sit<>stand with hemiwalker vs crutch with supervision within 6 visits to ensure progression to independence.    Goal Outcome # 2 Goal not met   Short Term Goal # 3 Pt will perform seated slideboard transfer with min A within 6 visits to ensure progression to independence within 6 vistis.    Goal Outcome # 3 Goal not met   Short Term Goal # 4 Pt will self propel w/c x 150ft with left Ue/LE with supervision within 6 visits to ensure progression to independence.    Goal Outcome # 4 Goal not met   Short Term Goal # 5 Pt will ascend/descend 3 stairs with left UE support and min A within 6 visits to enter/exit home.    Goal Outcome # 5  Goal not met   Education Group   Education Provided Role of Physical Therapist   Spine Precautions Patient Response Patient;Acceptance;Explanation;Reinforcement Needed;No Learning Evidence   Role of Physical Therapist Patient Response Patient;Acceptance;Explanation;Reinforcement Needed;No Learning Evidence;Verbal Demonstration   Exercises - Supine Patient Response Patient;Acceptance;Explanation;Verbal Demonstration;Reinforcement Needed;No Learning Evidence   Anticipated Discharge Equipment and Recommendations   DC Equipment Recommendations Unable to determine at this time   Discharge Recommendations Recommend post-acute placement for additional physical therapy services prior to discharge home   Interdisciplinary Plan of Care Collaboration   IDT Collaboration with  Nursing   Patient Position at End of Therapy In Bed;Call Light within Reach;Tray Table within Reach;Phone within Reach   Collaboration Comments RN Updated   Session Information   Date / Session Number  7/5-6(2/3, 7/7)

## 2021-07-05 NOTE — PROGRESS NOTES
Bedside shift report received from HU[facility].  Safety check complete.  All patient needs met at this time.  Will continue to monitor.

## 2021-07-06 ENCOUNTER — APPOINTMENT (OUTPATIENT)
Dept: RADIOLOGY | Facility: MEDICAL CENTER | Age: 56
DRG: 956 | End: 2021-07-06
Attending: ORTHOPAEDIC SURGERY
Payer: COMMERCIAL

## 2021-07-06 PROCEDURE — 700102 HCHG RX REV CODE 250 W/ 637 OVERRIDE(OP): Performed by: NURSE PRACTITIONER

## 2021-07-06 PROCEDURE — 73100 X-RAY EXAM OF WRIST: CPT | Mod: RT

## 2021-07-06 PROCEDURE — A9270 NON-COVERED ITEM OR SERVICE: HCPCS | Performed by: NURSE PRACTITIONER

## 2021-07-06 PROCEDURE — 73560 X-RAY EXAM OF KNEE 1 OR 2: CPT | Mod: RT

## 2021-07-06 PROCEDURE — 700101 HCHG RX REV CODE 250: Performed by: NURSE PRACTITIONER

## 2021-07-06 PROCEDURE — 73552 X-RAY EXAM OF FEMUR 2/>: CPT | Mod: RT

## 2021-07-06 PROCEDURE — 72190 X-RAY EXAM OF PELVIS: CPT

## 2021-07-06 PROCEDURE — 770006 HCHG ROOM/CARE - MED/SURG/GYN SEMI*

## 2021-07-06 RX ORDER — TAMSULOSIN HYDROCHLORIDE 0.4 MG/1
0.4 CAPSULE ORAL DAILY
Status: DISCONTINUED | OUTPATIENT
Start: 2021-07-07 | End: 2021-07-14 | Stop reason: HOSPADM

## 2021-07-06 RX ADMIN — GABAPENTIN 400 MG: 400 CAPSULE ORAL at 04:56

## 2021-07-06 RX ADMIN — MORPHINE SULFATE 15 MG: 15 TABLET, FILM COATED, EXTENDED RELEASE ORAL at 12:20

## 2021-07-06 RX ADMIN — HYDROMORPHONE HYDROCHLORIDE 4 MG: 2 TABLET ORAL at 15:06

## 2021-07-06 RX ADMIN — FUROSEMIDE 20 MG: 20 TABLET ORAL at 04:56

## 2021-07-06 RX ADMIN — GABAPENTIN 400 MG: 400 CAPSULE ORAL at 17:16

## 2021-07-06 RX ADMIN — LIDOCAINE 1 PATCH: 50 PATCH TOPICAL at 12:20

## 2021-07-06 RX ADMIN — TAMSULOSIN HYDROCHLORIDE 0.8 MG: 0.4 CAPSULE ORAL at 04:56

## 2021-07-06 RX ADMIN — RIVAROXABAN 15 MG: 15 TABLET, FILM COATED ORAL at 07:47

## 2021-07-06 RX ADMIN — HYDROMORPHONE HYDROCHLORIDE 4 MG: 2 TABLET ORAL at 07:47

## 2021-07-06 RX ADMIN — RIVAROXABAN 15 MG: 15 TABLET, FILM COATED ORAL at 17:16

## 2021-07-06 RX ADMIN — MORPHINE SULFATE 15 MG: 15 TABLET, FILM COATED, EXTENDED RELEASE ORAL at 04:56

## 2021-07-06 RX ADMIN — FAMOTIDINE 20 MG: 20 TABLET ORAL at 04:56

## 2021-07-06 RX ADMIN — MORPHINE SULFATE 15 MG: 15 TABLET, FILM COATED, EXTENDED RELEASE ORAL at 17:16

## 2021-07-06 RX ADMIN — HYDROMORPHONE HYDROCHLORIDE 4 MG: 2 TABLET ORAL at 21:10

## 2021-07-06 RX ADMIN — FAMOTIDINE 20 MG: 20 TABLET ORAL at 17:16

## 2021-07-06 RX ADMIN — GABAPENTIN 400 MG: 400 CAPSULE ORAL at 12:19

## 2021-07-06 ASSESSMENT — ENCOUNTER SYMPTOMS
RESPIRATORY NEGATIVE: 1
CONSTITUTIONAL NEGATIVE: 1
MYALGIAS: 1
PSYCHIATRIC NEGATIVE: 1
NEUROLOGICAL NEGATIVE: 1
ROS GI COMMENTS: BM 7/5
GASTROINTESTINAL NEGATIVE: 1

## 2021-07-06 ASSESSMENT — PAIN DESCRIPTION - PAIN TYPE
TYPE: SURGICAL PAIN
TYPE: SURGICAL PAIN
TYPE: ACUTE PAIN
TYPE: SURGICAL PAIN
TYPE: ACUTE PAIN

## 2021-07-06 NOTE — CARE PLAN
The patient is Stable - Low risk of patient condition declining or worsening     Progress made toward(s) clinical / shift goals: Patient remains hemodynamically stable and free from falls.  Alert and oriented x4 and cooperative.  Skin interventions in place.     Patient is not progressing towards the following goals:  Complicated/prolonged discharge planning due to patient's care needs and behavioral history.  Patient states pain medications not adequately controlling pain.  Patient continues to have loose incontinent stools.  Patient now requiring O2 therapy at night for hypoxia.  Patient progressing slowly with mobility and self-care.        Problem: Pain - Standard  Goal: Alleviation of pain or a reduction in pain to the patient’s comfort goal  Outcome: Not Progressing     Problem: Discharge Barriers/Planning  Goal: Patient's continuum of care needs are met  Outcome: Not Progressing     Problem: Respiratory  Goal: Patient will achieve/maintain optimum respiratory ventilation and gas exchange  Outcome: Not Progressing     Problem: Bowel Elimination  Goal: Establish and maintain regular bowel function  Outcome: Not Progressing     Problem: Mobility  Goal: Patient's capacity to carry out activities will improve  Outcome: Not Progressing     Problem: Self Care  Goal: Patient will have the ability to perform ADLs independently or with assistance (bathe, groom, dress, toilet and feed)  Outcome: Not Progressing              Problem: Knowledge Deficit - Standard  Goal: Patient and family/care givers will demonstrate understanding of plan of care, disease process/condition, diagnostic tests and medications  Outcome: Progressing     Problem: Skin Integrity  Goal: Skin integrity is maintained or improved  Outcome: Progressing     Problem: Psychosocial  Goal: Patient's level of anxiety will decrease  Outcome: Progressing  Goal: Patient's ability to verbalize feelings about condition will improve  Outcome: Progressing  Goal:  Patient's ability to re-evaluate and adapt role responsibilities will improve  Outcome: Progressing  Goal: Patient and family will demonstrate ability to cope with life altering diagnosis and/or procedure  Outcome: Progressing  Goal: Spiritual and cultural needs incorporated into hospitalization  Outcome: Progressing     Problem: Communication  Goal: The ability to communicate needs accurately and effectively will improve  Outcome: Progressing     Problem: Hemodynamics  Goal: Patient's hemodynamics, fluid balance and neurologic status will be stable or improve  Outcome: Progressing     Problem: Fluid Volume  Goal: Fluid volume balance will be maintained  Outcome: Progressing     Problem: Dysphagia  Goal: Dysphagia will improve  Outcome: Progressing     Problem: Risk for Aspiration  Goal: Patient's risk for aspiration will be absent or decrease  Outcome: Progressing     Problem: Nutrition  Goal: Patient's nutritional and fluid intake will be adequate or improve  Outcome: Progressing  Goal: Enteral nutrition will be maintained or improve  Outcome: Progressing  Goal: Enteral nutrition will be maintained or improve  Outcome: Progressing     Problem: Urinary Elimination  Goal: Establish and maintain regular urinary output  Outcome: Progressing     Problem: Gastrointestinal Irritability  Goal: Nausea and vomiting will be absent or improve  Outcome: Progressing  Goal: Diarrhea will be absent or improved  Outcome: Progressing     Problem: Infection - Standard  Goal: Patient will remain free from infection  Outcome: Progressing     Problem: Wound/ / Incision Healing  Goal: Patient's wound/surgical incision will decrease in size and heals properly  Outcome: Progressing

## 2021-07-06 NOTE — PROGRESS NOTES
Patient off the unit to X ray    1023am Patient returned to T312-1.  Problem: Safety   Safety precautions in place.  Call light and personal items within reach. Bed at lowest position and locked.  Siderails up X 2.  Clutter free environment & adequate lighting. Educated on level of risk and reminded to call for assistance.  Hourly rounding in effect.
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
## 2021-07-06 NOTE — CARE PLAN
The patient is Watcher - Medium risk of patient condition declining or worsening    Shift Goals  Clinical Goals: safety  Patient Goals: safety  Family Goals: no family present    Progress made toward(s) clinical / shift goals:  yes    Patient is not progressing towards the following goals:n/a    Problem: Safety  Goal: Will remain free from falls  Outcome: PROGRESSING AS EXPECTED   Safety precautions in place.  Bed alarm is on. Call light and personal items within reach. Bed at lowest position and locked.  Siderails up X 2.  Clutter free environment & adequate lighting. Educated on level of risk and reminded to call for assistance.  Hourly rounding in effect.     Problem: Infection  Goal: Will remain free from infection  Outcome: PROGRESSING AS EXPECTED   Afebrile. Standard precautions in effect.  Hand washing every encounter, and before & after patient care.  Verbalized understanding.     Problem: Knowledge Deficit  Goal: Knowledge of disease process/condition, treatment plan, diagnostic tests, and medications will improve  Outcome: PROGRESSING AS EXPECTED   Discussed plan of care.  Questions answered.  Verbalized understanding.     Problem: Mobility  Goal: Risk for activity intolerance will decrease  Outcome: PROGRESSING AS EXPECTED  Educated on ROM exercises, risks and benefits.  Verbalized understanding.   shamar and non pharmacological pain methods, check the MAR

## 2021-07-06 NOTE — PROGRESS NOTES
Patient was placed on continuous pulse oximeter at the beginning of shift.    2215-  Pulse oximeter alarming.  Patient currently sleeping. I observed periods of shallow breathing with no apnea during which patient desaturated to as low as 79% on RA over several minutes, then returned back to 92-95% when breathing more deeply.    2220- Patient was placed on 2L NC.  Patient's saturation improved to 96%-98%. Patient still sleeping at this time, will provide education when awake.

## 2021-07-06 NOTE — PROGRESS NOTES
Mobility Progress Note    Surgery patient: YES  Date of surgery: 06/22/2021  Ambulated 50 ft on day of surgery? (N/A if patient did not undergo surgery today): N/A  Number of times ambulated 50 feet or greater today: NO  Patient has been up to chair, edge of bed or HOB 90 degrees for all meals?: YES  Goal met? (goal is ambulating at least 50 feet 2 times on day shift, one time on night shift): N/A  If patient did not meet mobility goal, why?: Patient sat up to chair today wearing TLSO brace,   Patient tires quickly, does not tolerate ambulation.  Patient sat up in bed for meals with HOB at 90 degrees.  Encouraged ROM exercises. Hourly rounding in effect.

## 2021-07-07 PROBLEM — S01.111A: Status: RESOLVED | Noted: 2021-06-14 | Resolved: 2021-07-07

## 2021-07-07 PROBLEM — S06.0X1A CONCUSSION WITH LOSS OF CONSCIOUSNESS OF 30 MINUTES OR LESS: Status: ACTIVE | Noted: 2021-07-07

## 2021-07-07 PROCEDURE — A9270 NON-COVERED ITEM OR SERVICE: HCPCS | Performed by: NURSE PRACTITIONER

## 2021-07-07 PROCEDURE — 700102 HCHG RX REV CODE 250 W/ 637 OVERRIDE(OP): Performed by: NURSE PRACTITIONER

## 2021-07-07 PROCEDURE — A9270 NON-COVERED ITEM OR SERVICE: HCPCS | Performed by: SURGERY

## 2021-07-07 PROCEDURE — 700102 HCHG RX REV CODE 250 W/ 637 OVERRIDE(OP): Performed by: SURGERY

## 2021-07-07 PROCEDURE — 770006 HCHG ROOM/CARE - MED/SURG/GYN SEMI*

## 2021-07-07 PROCEDURE — 700101 HCHG RX REV CODE 250: Performed by: NURSE PRACTITIONER

## 2021-07-07 RX ADMIN — RIVAROXABAN 15 MG: 15 TABLET, FILM COATED ORAL at 18:23

## 2021-07-07 RX ADMIN — MORPHINE SULFATE 15 MG: 15 TABLET, FILM COATED, EXTENDED RELEASE ORAL at 12:09

## 2021-07-07 RX ADMIN — MORPHINE SULFATE 15 MG: 15 TABLET, FILM COATED, EXTENDED RELEASE ORAL at 18:22

## 2021-07-07 RX ADMIN — GABAPENTIN 400 MG: 400 CAPSULE ORAL at 04:40

## 2021-07-07 RX ADMIN — POLYETHYLENE GLYCOL 3350 1 PACKET: 17 POWDER, FOR SOLUTION ORAL at 18:21

## 2021-07-07 RX ADMIN — METAXALONE 400 MG: 800 TABLET ORAL at 20:04

## 2021-07-07 RX ADMIN — FUROSEMIDE 20 MG: 20 TABLET ORAL at 04:40

## 2021-07-07 RX ADMIN — LIDOCAINE 2 PATCH: 50 PATCH TOPICAL at 12:16

## 2021-07-07 RX ADMIN — DOCUSATE SODIUM 100 MG: 100 CAPSULE ORAL at 18:23

## 2021-07-07 RX ADMIN — MORPHINE SULFATE 15 MG: 15 TABLET, FILM COATED, EXTENDED RELEASE ORAL at 04:40

## 2021-07-07 RX ADMIN — GABAPENTIN 400 MG: 400 CAPSULE ORAL at 18:23

## 2021-07-07 RX ADMIN — FAMOTIDINE 20 MG: 20 TABLET ORAL at 18:23

## 2021-07-07 RX ADMIN — RIVAROXABAN 15 MG: 15 TABLET, FILM COATED ORAL at 09:10

## 2021-07-07 RX ADMIN — FAMOTIDINE 20 MG: 20 TABLET ORAL at 04:40

## 2021-07-07 RX ADMIN — HYDROMORPHONE HYDROCHLORIDE 4 MG: 2 TABLET ORAL at 12:08

## 2021-07-07 RX ADMIN — HYDROMORPHONE HYDROCHLORIDE 4 MG: 2 TABLET ORAL at 04:40

## 2021-07-07 RX ADMIN — TAMSULOSIN HYDROCHLORIDE 0.4 MG: 0.4 CAPSULE ORAL at 05:16

## 2021-07-07 RX ADMIN — HYDROMORPHONE HYDROCHLORIDE 4 MG: 2 TABLET ORAL at 18:22

## 2021-07-07 RX ADMIN — GABAPENTIN 400 MG: 400 CAPSULE ORAL at 12:08

## 2021-07-07 ASSESSMENT — ENCOUNTER SYMPTOMS
SHORTNESS OF BREATH: 0
NEUROLOGICAL NEGATIVE: 1
VOMITING: 0
TINGLING: 0
BLURRED VISION: 0
CONSTIPATION: 1
CHILLS: 0
DIZZINESS: 0
FEVER: 0
MYALGIAS: 1
SPEECH CHANGE: 0
ABDOMINAL PAIN: 0
SENSORY CHANGE: 0
HEADACHES: 0
DOUBLE VISION: 0
GASTROINTESTINAL NEGATIVE: 1
CONSTITUTIONAL NEGATIVE: 1
FOCAL WEAKNESS: 0
NAUSEA: 0

## 2021-07-07 ASSESSMENT — PAIN DESCRIPTION - PAIN TYPE
TYPE: ACUTE PAIN
TYPE: ACUTE PAIN;SURGICAL PAIN
TYPE: ACUTE PAIN
TYPE: ACUTE PAIN;SURGICAL PAIN
TYPE: ACUTE PAIN;SURGICAL PAIN
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
## 2021-07-07 NOTE — DISCHARGE PLANNING
Anticipated Discharge Disposition: SNF    Action: LSW completed chart review. Pt has been declined by [place] SNFs due to drug use. Pt's wife has requested that the pt be transferred to [facility] as that's where he has previously received care.    Barriers to Discharge: SNF acceptance.    Plan: Care coordination will follow up with pt's insurance to find additional SNFs they are contracted with.

## 2021-07-07 NOTE — CARE PLAN
Problem: Communication  Goal: The ability to communicate needs accurately and effectively will improve  Outcome: Progressing     Problem: Respiratory  Goal: Patient will achieve/maintain optimum respiratory ventilation and gas exchange  Outcome: Progressing   The patient is Watcher - Medium risk of patient condition declining or worsening    Shift Goals  Clinical Goals: Safety  Patient Goals: Decreased pain  Family Goals: No family present    Progress made toward(s) clinical / shift goals:  Patient is at moderate risk for falls. Patient education completed; fall precautions in place. Patient calls appropriately. Patient stated pain in his back, and in his right extremities. Patient advised on comfort measures. Medication given per MAR.    Patient is not progressing towards the following goals:

## 2021-07-07 NOTE — PROGRESS NOTES
"Orthopaedic Progress Note    Author: MARCY Tran Date & Time created: 7/6/2021   12:47 PM     Interval Events:  Patient doing well   RUE splint in place without issue   POD#14 S/P   1.  Open reduction and internal fixation of bicondylar tibial plateau fracture.  2.  Open reduction and internal fixation of right distal radius fracture.  3.  Application of allograft, right wrist  POD#21 S/P   1.  Closed retrograde intramedullary lock nailing right femur   2.  Left percutaneous fluoroscopically guided iliosacral screw placement   3.  Right anterior inferior iliac spine screw for iliac wing fracture      Review of Systems   Constitutional: Negative.    Cardiovascular: Chest pain: rib fx    Gastrointestinal: Negative.    Musculoskeletal:        Pain well controlled    Neurological: Negative.      Hemodynamics:  /67   Pulse 80   Temp 36.6 °C (97.8 °F) (Temporal)   Resp 18   Ht 1.753 m (5' 9.02\")   Wt 79.6 kg (175 lb 7.8 oz)   SpO2 94%      No Active Precaution Orders    Respiratory:    Respiration: 18, Pulse Oximetry: 94 %     Work Of Breathing / Effort: Within Normal Limits  RUL Breath Sounds: Clear, RML Breath Sounds: Diminished, RLL Breath Sounds: Diminished, ROD Breath Sounds: Clear, LLL Breath Sounds: Diminished    Physical Exam   HENT:   Head: Normocephalic and atraumatic.   Pulmonary/Chest: He exhibits tenderness (rib fx ).   Musculoskeletal:      Comments: RUE splint CDI, DNVI, moves all fingers, cap refill <2 sec. RLE dressing CDI, DNVI, cap refill <2 sec.      Labs:            Medical Decision Making/Problem List:    Active Hospital Problems    Diagnosis    • Hypotension [I95.9]    • Status post cardiac surgery [Z98.890]    • Respiratory failure following trauma (HCC) [J96.90]    • Closed fracture of right femur ([facility]) [S72.91XA]    • Closed fracture of distal end of right radius [S52.501A]    • Screening examination for infectious disease [Z11.9]    • Contraindication to deep vein thrombosis " (DVT) prophylaxis [Z53.09]    • Eyelid laceration, right, initial encounter [S01.111A]    • Wedge fracture of lumbar vertebra (HCC) [S32.000A]    • 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  Clearance for lovenox/heparin: per trauma   Weight Bearing Status: NWB RUE and RLE  Wounds & Drains: dressings changed every other day by nursing, splints left in place  Disposition and Follow-up: per therapy recs

## 2021-07-07 NOTE — ASSESSMENT & PLAN NOTE
Admission head CT negative for acute intracranial injury.  6/18 Cognitive evaluation completed. Mild deficits in the areas of short term and immediate memory, and attention (sustained, divided and alternating).  SLP following.

## 2021-07-07 NOTE — PROGRESS NOTES
Assumed care at 1900. Received bedside report from [facility]. Patient was awake and sitting upright in bed. Patient alert and oriented x4. Patient stated pain in his back and right upper and lower extremities. Medication given per MAR. Patient denied any additional acute needs.No signs of distress. Bed is in low and locked position. Non-slip socks in place. Call light is within reach. Bed alarm is on. Hourly rounding in place.

## 2021-07-07 NOTE — PROGRESS NOTES
"Orthopaedic Progress Note    Author: MARCY Tran Date & Time created: 7/6/2021   12:47 PM     Interval Events:  Patient doing well   RUE splint in place without issue   Staples out today    POD#15 S/P   1.  Open reduction and internal fixation of bicondylar tibial plateau fracture.  2.  Open reduction and internal fixation of right distal radius fracture.  3.  Application of allograft, right wrist  POD#22 S/P   1.  Closed retrograde intramedullary lock nailing right femur   2.  Left percutaneous fluoroscopically guided iliosacral screw placement   3.  Right anterior inferior iliac spine screw for iliac wing fracture      Review of Systems   Constitutional: Negative.    Cardiovascular: Chest pain: rib fx    Gastrointestinal: Negative.    Musculoskeletal:        Pain well controlled    Neurological: Negative.      Hemodynamics:  /69   Pulse 72   Temp 36.5 °C (97.7 °F) (Temporal)   Resp 18   Ht 1.753 m (5' 9.02\")   Wt 79.6 kg (175 lb 7.8 oz)   SpO2 95%      No Active Precaution Orders    Respiratory:    Respiration: 18, Pulse Oximetry: 95 %     Work Of Breathing / Effort: Within Normal Limits  RUL Breath Sounds: Clear, RML Breath Sounds: Diminished, RLL Breath Sounds: Diminished, ROD Breath Sounds: Clear, LLL Breath Sounds: Diminished    Physical Exam   HENT:   Head: Normocephalic and atraumatic.   Pulmonary/Chest: He exhibits tenderness (rib fx ).   Musculoskeletal:      Comments: RUE splint CDI, DNVI, moves all fingers, cap refill <2 sec. RLE dressing CDI, DNVI, cap refill <2 sec.      Labs:  None today    Medical Decision Making/Problem List:    Active Hospital Problems    Diagnosis    • Hypotension [I95.9]    • Status post cardiac surgery [Z98.890]    • Respiratory failure following trauma (HCC) [J96.90]    • Closed fracture of right femur (HCC) [S72.91XA]    • Closed fracture of distal end of right radius [S52.501A]    • Screening examination for infectious disease [Z11.9]    • " Contraindication to deep vein thrombosis (DVT) prophylaxis [Z53.09]    • Eyelid laceration, right, initial encounter [S01.111A]    • Wedge fracture of lumbar vertebra (HCC) [S32.000A]    • 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  Clearance for lovenox/heparin: per trauma   Weight Bearing Status: NWB RUE and RLE  Wounds & Drains: dressings changed every other day by nursing, splints left in place  Disposition and Follow-up: per therapy recs   Follow-Up: needs appointment with Dr. Odonnell or Dr. Rodríguez next week at [facility] for re-evaluation and radiographs.

## 2021-07-08 PROCEDURE — 700102 HCHG RX REV CODE 250 W/ 637 OVERRIDE(OP): Performed by: SURGERY

## 2021-07-08 PROCEDURE — 770006 HCHG ROOM/CARE - MED/SURG/GYN SEMI*

## 2021-07-08 PROCEDURE — 700102 HCHG RX REV CODE 250 W/ 637 OVERRIDE(OP): Performed by: NURSE PRACTITIONER

## 2021-07-08 PROCEDURE — A9270 NON-COVERED ITEM OR SERVICE: HCPCS | Performed by: NURSE PRACTITIONER

## 2021-07-08 PROCEDURE — 700101 HCHG RX REV CODE 250: Performed by: NURSE PRACTITIONER

## 2021-07-08 PROCEDURE — 97530 THERAPEUTIC ACTIVITIES: CPT

## 2021-07-08 PROCEDURE — A9270 NON-COVERED ITEM OR SERVICE: HCPCS | Performed by: SURGERY

## 2021-07-08 PROCEDURE — 97535 SELF CARE MNGMENT TRAINING: CPT

## 2021-07-08 RX ADMIN — MORPHINE SULFATE 15 MG: 15 TABLET, FILM COATED, EXTENDED RELEASE ORAL at 17:22

## 2021-07-08 RX ADMIN — FUROSEMIDE 20 MG: 20 TABLET ORAL at 05:42

## 2021-07-08 RX ADMIN — GABAPENTIN 400 MG: 400 CAPSULE ORAL at 17:22

## 2021-07-08 RX ADMIN — POLYETHYLENE GLYCOL 3350 1 PACKET: 17 POWDER, FOR SOLUTION ORAL at 17:23

## 2021-07-08 RX ADMIN — METAXALONE 400 MG: 800 TABLET ORAL at 03:58

## 2021-07-08 RX ADMIN — GABAPENTIN 400 MG: 400 CAPSULE ORAL at 05:43

## 2021-07-08 RX ADMIN — LIDOCAINE 3 PATCH: 50 PATCH TOPICAL at 13:29

## 2021-07-08 RX ADMIN — TAMSULOSIN HYDROCHLORIDE 0.4 MG: 0.4 CAPSULE ORAL at 05:43

## 2021-07-08 RX ADMIN — HYDROMORPHONE HYDROCHLORIDE 4 MG: 2 TABLET ORAL at 17:22

## 2021-07-08 RX ADMIN — FAMOTIDINE 20 MG: 20 TABLET ORAL at 17:22

## 2021-07-08 RX ADMIN — MORPHINE SULFATE 15 MG: 15 TABLET, FILM COATED, EXTENDED RELEASE ORAL at 05:42

## 2021-07-08 RX ADMIN — RIVAROXABAN 15 MG: 15 TABLET, FILM COATED ORAL at 17:23

## 2021-07-08 RX ADMIN — HYDROMORPHONE HYDROCHLORIDE 4 MG: 2 TABLET ORAL at 02:24

## 2021-07-08 RX ADMIN — HYDROMORPHONE HYDROCHLORIDE 4 MG: 2 TABLET ORAL at 10:42

## 2021-07-08 RX ADMIN — FAMOTIDINE 20 MG: 20 TABLET ORAL at 05:42

## 2021-07-08 RX ADMIN — GABAPENTIN 400 MG: 400 CAPSULE ORAL at 13:01

## 2021-07-08 RX ADMIN — MORPHINE SULFATE 15 MG: 15 TABLET, FILM COATED, EXTENDED RELEASE ORAL at 13:02

## 2021-07-08 RX ADMIN — HYDROMORPHONE HYDROCHLORIDE 4 MG: 2 TABLET ORAL at 23:39

## 2021-07-08 RX ADMIN — METAXALONE 400 MG: 800 TABLET ORAL at 13:01

## 2021-07-08 RX ADMIN — DOCUSATE SODIUM 100 MG: 100 CAPSULE ORAL at 17:22

## 2021-07-08 RX ADMIN — RIVAROXABAN 15 MG: 15 TABLET, FILM COATED ORAL at 10:42

## 2021-07-08 ASSESSMENT — PAIN DESCRIPTION - PAIN TYPE
TYPE: ACUTE PAIN;SURGICAL PAIN

## 2021-07-08 ASSESSMENT — COGNITIVE AND FUNCTIONAL STATUS - GENERAL
DRESSING REGULAR LOWER BODY CLOTHING: A LOT
TURNING FROM BACK TO SIDE WHILE IN FLAT BAD: UNABLE
TOILETING: A LOT
DAILY ACTIVITIY SCORE: 15
SUGGESTED CMS G CODE MODIFIER MOBILITY: CM
MOVING FROM LYING ON BACK TO SITTING ON SIDE OF FLAT BED: UNABLE
HELP NEEDED FOR BATHING: A LOT
SUGGESTED CMS G CODE MODIFIER DAILY ACTIVITY: CK
MOVING TO AND FROM BED TO CHAIR: UNABLE
MOBILITY SCORE: 7
WALKING IN HOSPITAL ROOM: TOTAL
DRESSING REGULAR UPPER BODY CLOTHING: A LOT
PERSONAL GROOMING: A LITTLE
CLIMB 3 TO 5 STEPS WITH RAILING: TOTAL
STANDING UP FROM CHAIR USING ARMS: A LOT

## 2021-07-08 ASSESSMENT — ENCOUNTER SYMPTOMS
GASTROINTESTINAL NEGATIVE: 1
CONSTITUTIONAL NEGATIVE: 1
NEUROLOGICAL NEGATIVE: 1

## 2021-07-08 ASSESSMENT — GAIT ASSESSMENTS: GAIT LEVEL OF ASSIST: UNABLE TO PARTICIPATE

## 2021-07-08 NOTE — THERAPY
Occupational Therapy  Daily Treatment     Patient Name: Jesus Gorman  Age:  55 y.o., Sex:  male  Medical Record #: [id number]  Today's Date: 7/8/2021     Precautions  Precautions: Fall Risk, Non Weight Bearing Right Lower Extremity, Non Weight Bearing Right Upper Extremity, TLSO (Thoracolumbosacral orthosis), Immobilizer Right Lower Extremity, Spinal / Back Precautions   Comments: TLSO EOB, immobilizer R knee, anxious    Assessment    Pt seen for OT session. Progressing with activity tolerance and sitting up in w/c. C/o lightheadedness after one hour in w/c BTB; req max A and assist with RLE d/t pain with SB txf. Seated g/h with SPV. Continued edu on TLSO don/doff, but continues to have limited carryover of edu. Max v/cs to maintain NWB RUE. Continues to be limited by decreased functional mobility, activity tolerance, cognition, strength, AROM, balance, adherence to precautions, and pain which are currently affecting pt's ability to complete ADLs/IADLs at baseline. Will continue to follow.     Plan    Continue current treatment plan.    DC Equipment Recommendations: Unable to determine at this time  Discharge Recommendations: Recommend post-acute placement for additional occupational therapy services prior to discharge home     Objective       07/08/21 1340   Precautions   Precautions Fall Risk;Non Weight Bearing Right Lower Extremity;Non Weight Bearing Right Upper Extremity;Immobilizer Right Lower Extremity;TLSO (Thoracolumbosacral orthosis);Spinal / Back Precautions    Comments TLSO EOB, immobilizer R knee, anxious   Vitals   O2 Delivery Device None - Room Air   Vitals Comments reported lightheadedness/dizziness about an hour after txf to chair; BP WFL   Pain 0 - 10 Group   Location Arm;Leg;Rib Cage   Location Orientation Right;Left   Therapist Pain Assessment Post Activity;During Activity;Nurse Notified  (not quantified)   Cognition    Cognition / Consciousness X   Level of Consciousness Alert   Safety Awareness  Impaired   New Learning Impaired   Attention Impaired   Sequencing Impaired   Comments motivated and cooperative; continues to be anxious but demo'd more insight today requesting BTB when getting dizzy/fatigued   Passive ROM Upper Body   Passive ROM Upper Body X   Comments R wrist in splint   Active ROM Upper Body   Active ROM Upper Body  X   Dominant Hand Right   Comments LUE WFL, RUE limited by brace on wrist, full AROM of fingers.   Strength Upper Body   Upper Body Strength  X   Comments LUE WFL; RUE limited by brace req max v/cs for NWB   Supine Upper Body Exercises   Comments encouraged continued use of R fingers/hand for light ADLs   Other Treatments   Other Treatments Provided continues to req max v/cs to maintain NWB of RUE   Balance   Sitting Balance (Static) Fair   Sitting Balance (Dynamic) Fair -   Weight Shift Sitting Poor   Skilled Intervention Verbal Cuing;Tactile Cuing;Sequencing;Postural Facilitation;Facilitation;Compensatory Strategies   Comments SB txf to w/c   Bed Mobility    Supine to Sit Maximal Assist   Sit to Supine Maximal Assist   Scooting Moderate Assist   Rolling Moderate Assist to Lt.;Maximal Assist to Rt.   Skilled Intervention Verbal Cuing;Tactile Cuing;Facilitation;Compensatory Strategies;Sequencing;Postural Facilitation   Comments req v/cs and assist to maintian NWB and with RLE   Activities of Daily Living   Grooming Supervision;Seated  (wiping armpits and face with cloth)   Upper Body Dressing Maximal Assist  (min A with gown, max with TLSO)   Lower Body Dressing Moderate Assist  (req min A for LLE, but max A for RLE)   Toileting Maximal Assist  (dirty from BM req cleanup able to use urinal)   Skilled Intervention Verbal Cuing;Tactile Cuing;Facilitation;Compensatory Strategies;Sequencing;Postural Facilitation   How much help from another person does the patient currently need...   Putting on and taking off regular lower body clothing? 2   Bathing (including washing, rinsing, and  "drying)? 2   Toileting, which includes using a toilet, bedpan, or urinal? 2   Putting on and taking off regular upper body clothing? 2   Taking care of personal grooming such as brushing teeth? 3   Eating meals? 4   6 Clicks Daily Activity Score 15   Functional Mobility   Bed, Chair, Wheelchair Transfer Maximal Assist   Transfer Method Slide Board   Mobility bed>w/c>BTB 1 hr later   Skilled Intervention Verbal Cuing;Tactile Cuing;Sequencing;Postural Facilitation;Facilitation;Compensatory Strategies   Comments req max v/cs for sequencing, but still unable to complete   Activity Tolerance   Comments limited by pain/fatigue after w/c for 1 hr   Patient / Family Goals   Patient / Family Goal #1 \"To get out of bed\"   Short Term Goals   Short Term Goal # 1 Pt will complete ADL transfers with min A   Goal Outcome # 1 Progressing slower than expected   Short Term Goal # 2 Pt will complete UB dressing with min A using jodi technique    Goal Outcome # 2 Progressing slower than expected   Short Term Goal # 3 Pt will complete seated grooming with supv    Goal Outcome # 3 Goal met   Short Term Goal # 4 Pt will increase R composite flexion/grasp to 100% to incorporate as stabilizer during bimanual functional tasks    Goal Outcome # 4 Progressing as expected   Education Group   Education Provided Transfers;Activities of Daily Living;Pathology of bedrest;Weight Bearing Precautions;Back Safety   Back Safety Patient Response Patient;Acceptance;Explanation;Reinforcement Needed;Action Demonstration   Transfers Patient Response Patient;Acceptance;Explanation;Reinforcement Needed;Demonstration;No Learning Evidence   ADL Patient Response Patient;Acceptance;Explanation;Demonstration;Action Demonstration;Reinforcement Needed   Weight Bearing Precautions Patient Response Patient;Acceptance;Explanation;Verbal Demonstration;Reinforcement Needed   Pathology of Bedrest Patient Response Patient;Acceptance;Explanation;Verbal " Demonstration;Reinforcement Needed

## 2021-07-08 NOTE — CARE PLAN
The patient is Stable - Low risk of patient condition declining or worsening    Shift Goals  Clinical Goals: safety  Patient Goals: pain control  Family Goals: No family present    Progress made toward(s) clinical / shift goals:  Taught pt 0-10 pain scale and  non pharmacological method of pain mgt, encouraged to verbalize when in pain. Administered PRN pain med as needed.

## 2021-07-08 NOTE — THERAPY
Physical Therapy   Daily Treatment     Patient Name: Jesus Gorman  Age:  55 y.o., Sex:  male  Medical Record #: [id number]  Today's Date: 7/8/2021     Precautions: Fall Risk, Non Weight Bearing Right Lower Extremity, Non Weight Bearing Right Upper Extremity, TLSO (Thoracolumbosacral orthosis), Immobilizer Right Lower Extremity, Spinal / Back Precautions     Assessment  Pt continues to slowly progress towards therapy goals with improved mobility and activity tolerance with decreased reported pain. Still limited by anxiety and lack of retention for NWB precautions for RUE/RLE. Pt requires MAX x 2 for bed mobility, MAX x 2 for slide board transfer, MODA for wheelchair mobility 100 feet for steering/propulsion as he is unable to steer with LLE. Heavy cues for sequencing and NWB RUE/RLE throughout. Up to w/c end of session. Pt limited by anxiety, pain and fatigue. Will continue to progress POC as tolerated.         Plan    Continue current treatment plan.    DC Equipment Recommendations: Unable to determine at this time  Discharge Recommendations:  Recommend post-acute placement for additional physical therapy services prior to discharge home      Subjective  Pt agreeable to PT session reporting continued pain at rest.      Objective  Pt requires MAX x 2 for bed mobility, MAX x 2 for slide board transfer, MODA for wheelchair mobility 100 feet for steering/propulsion as he is unable to steer with LLE. Heavy cues for sequencing and NWB RUE/RLE throughout. Up to w/c end of session.      07/08/21 1402   Total Time Spent   Total Time Spent (Mins) 40   Charge Group   Charges  Yes   PT Therapeutic Activities 3   Precautions   Precautions Fall Risk;Non Weight Bearing Right Lower Extremity;Non Weight Bearing Right Upper Extremity;TLSO (Thoracolumbosacral orthosis);Immobilizer Right Lower Extremity;Spinal / Back Precautions    Comments TLSO EOB, immobilizer R knee, anxious   Vitals   Vitals Comments WNL throughout   Pain 0 - 10  Group   Therapist Pain Assessment During Activity;Post Activity  (high levels of pain during mobility, not quantified)   Non Verbal Descriptors   Non Verbal Scale  Calm   Cognition    Level of Consciousness Alert   Comments Distracted, talktative, anxious   Neuro-Muscular Treatments   Neuro-Muscular Treatments Anterior weight shift;Postural Facilitation;Tactile Cuing;Sequencing;Verbal Cuing   Balance   Sitting Balance (Static) Fair   Sitting Balance (Dynamic) Fair -   Weight Shift Sitting Poor   Skilled Intervention Postural Facilitation;Sequencing;Tactile Cuing;Verbal Cuing   Comments Slide board transfer only   Gait Analysis   Gait Level Of Assist Unable to Participate   Comments Cannot maintain WB restrictions to stand   Bed Mobility    Supine to Sit Maximal Assist   Sit to Supine Maximal Assist   Scooting Moderate Assist   Rolling Maximal Assist to Rt.;Maximum Assist to Lt.   Skilled Intervention Verbal Cuing;Tactile Cuing;Postural Facilitation   Comments Cues throughout   Functional Mobility   Bed, Chair, Wheelchair Transfer Maximal Assist  (slide board only)   Transfer Method Slide Board   Mobility bed<>w/c   Wheelchair Assist Moderate Assist  (Cannot coordinate LLE to steer, only able to propel with LUE)   Distance Wheelchair (Feet or Distance) 100   Skilled Intervention Verbal Cuing;Tactile Cuing;Sequencing;Postural Facilitation   Comments Cueing throughout, assist with RUE/RLE NWB   How much difficulty does the patient currently have...   Turning over in bed (including adjusting bedclothes, sheets and blankets)? 1   Sitting down on and standing up from a chair with arms (e.g., wheelchair, bedside commode, etc.) 1   Moving from lying on back to sitting on the side of the bed? 1   How much help from another person does the patient currently need...   Moving to and from a bed to a chair (including a wheelchair)? 2   Need to walk in a hospital room? 1   Climbing 3-5 steps with a railing? 1   6 clicks Mobility  Score 7   Activity Tolerance   Sitting in Chair up to w/c   Sitting Edge of Bed >10   Standing NT   Comments pain, fatigue, anxiety   Patient / Family Goals    Patient / Family Goal #1 to improve activity tolerance    Goal #1 Outcome Progressing as expected   Short Term Goals    Short Term Goal # 1 Pt will perform supine<>sit from flat HOB/no railing with supervision within 6 visits to ensure independent mobility at home.   Goal Outcome # 1 goal not met   Short Term Goal # 2 Pt will perform sit<>stand with hemiwalker vs crutch with supervision within 6 visits to ensure progression to independence.    Goal Outcome # 2 Goal not met   Short Term Goal # 3 Pt will perform seated slideboard transfer with min A within 6 visits to ensure progression to independence within 6 vistis.    Goal Outcome # 3 Goal not met   Short Term Goal # 4 Pt will self propel w/c x 150ft with left Ue/LE with supervision within 6 visits to ensure progression to independence.    Goal Outcome # 4 Progressing as expected   Short Term Goal # 5 Pt will ascend/descend 3 stairs with left UE support and min A within 6 visits to enter/exit home.    Goal Outcome # 5 Goal not met   Education Group   Education Provided Role of Physical Therapist;Weight Bearing Precautions   Role of Physical Therapist Patient Response Patient;Acceptance;Explanation;Verbal Demonstration;Reinforcement Needed   Anticipated Discharge Equipment and Recommendations   DC Equipment Recommendations Unable to determine at this time   Discharge Recommendations Recommend post-acute placement for additional physical therapy services prior to discharge home   Interdisciplinary Plan of Care Collaboration   IDT Collaboration with  Nursing   Patient Position at End of Therapy In Bed;Call Light within Reach;Tray Table within Reach;Phone within Reach   Collaboration Comments RN Updated   Session Information   Date / Session Number  7/8-7(1/3, 7/14)

## 2021-07-08 NOTE — CARE PLAN
The patient is Stable - Low risk of patient condition declining or worsening    Shift Goals  Clinical Goals: safety  Patient Goals: pain control  Family Goals: No family present    Progress made toward(s) clinical / shift goals:  Patient receiving PRN and scheduled pain medication. Dressings in place. VSS. Will continue to monitor.    Patient is not progressing towards the following goals:      Problem: Pain - Standard  Goal: Alleviation of pain or a reduction in pain to the patient’s comfort goal  Outcome: Progressing     Problem: Skin Integrity  Goal: Skin integrity is maintained or improved  Outcome: Progressing

## 2021-07-08 NOTE — PROGRESS NOTES
"Orthopaedic Progress Note    Author: MARCY Tran Date & Time created: 7/8/2021   12:47 PM     Interval Events:  Patient doing well   RUE splint in place without issue   Staples out today    POD#16 S/P   1.  Open reduction and internal fixation of bicondylar tibial plateau fracture.  2.  Open reduction and internal fixation of right distal radius fracture.  3.  Application of allograft, right wrist  POD#23 S/P   1.  Closed retrograde intramedullary lock nailing right femur   2.  Left percutaneous fluoroscopically guided iliosacral screw placement   3.  Right anterior inferior iliac spine screw for iliac wing fracture      Review of Systems   Constitutional: Negative.    Cardiovascular: Chest pain: rib fx    Gastrointestinal: Negative.    Musculoskeletal:        Pain well controlled    Neurological: Negative.      Hemodynamics:  /63   Pulse 79   Temp 36.7 °C (98 °F) (Temporal)   Resp 16   Ht 1.753 m (5' 9.02\")   Wt 79.6 kg (175 lb 7.8 oz)   SpO2 96%      No Active Precaution Orders    Respiratory:    Respiration: 16, Pulse Oximetry: 96 %     Work Of Breathing / Effort: Within Normal Limits  RUL Breath Sounds: Clear, RML Breath Sounds: Diminished, RLL Breath Sounds: Diminished, ROD Breath Sounds: Clear, LLL Breath Sounds: Diminished    Physical Exam   HENT:   Head: Normocephalic and atraumatic.   Pulmonary/Chest: He exhibits tenderness (rib fx ).   Musculoskeletal:      Comments: RUE splint CDI, DNVI, moves all fingers, cap refill <2 sec. RLE dressing CDI, DNVI, cap refill <2 sec.      Labs:  None today    Medical Decision Making/Problem List:    Active Hospital Problems    Diagnosis    • Hypotension [I95.9]    • Status post cardiac surgery [Z98.890]    • Respiratory failure following trauma (HCC) [J96.90]    • Closed fracture of right femur ([facility]) [S72.91XA]    • Closed fracture of distal end of right radius [S52.501A]    • Screening examination for infectious disease [Z11.9]    • Contraindication " to deep vein thrombosis (DVT) prophylaxis [Z53.09]    • Eyelid laceration, right, initial encounter [S01.111A]    • Wedge fracture of lumbar vertebra (HCC) [S32.000A]    • 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  Clearance for lovenox/heparin: per trauma   Weight Bearing Status: NWB RUE and RLE  Wounds & Drains: dressings changed every other day by nursing, splints left in place  Disposition and Follow-up: per therapy recs   Follow-Up: needs appointment with Dr. Odonnell or Dr. Rodríguez next week at [facility] for re-evaluation and radiographs.
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
## 2021-07-09 PROCEDURE — A9270 NON-COVERED ITEM OR SERVICE: HCPCS | Performed by: NURSE PRACTITIONER

## 2021-07-09 PROCEDURE — 700102 HCHG RX REV CODE 250 W/ 637 OVERRIDE(OP): Performed by: NURSE PRACTITIONER

## 2021-07-09 PROCEDURE — 770006 HCHG ROOM/CARE - MED/SURG/GYN SEMI*

## 2021-07-09 RX ADMIN — GABAPENTIN 400 MG: 400 CAPSULE ORAL at 04:52

## 2021-07-09 RX ADMIN — FUROSEMIDE 20 MG: 20 TABLET ORAL at 04:52

## 2021-07-09 RX ADMIN — FAMOTIDINE 20 MG: 20 TABLET ORAL at 04:52

## 2021-07-09 RX ADMIN — HYDROMORPHONE HYDROCHLORIDE 4 MG: 2 TABLET ORAL at 17:43

## 2021-07-09 RX ADMIN — GABAPENTIN 400 MG: 400 CAPSULE ORAL at 12:27

## 2021-07-09 RX ADMIN — GABAPENTIN 400 MG: 400 CAPSULE ORAL at 17:42

## 2021-07-09 RX ADMIN — MORPHINE SULFATE 15 MG: 15 TABLET, FILM COATED, EXTENDED RELEASE ORAL at 17:43

## 2021-07-09 RX ADMIN — FAMOTIDINE 20 MG: 20 TABLET ORAL at 17:43

## 2021-07-09 RX ADMIN — HYDROMORPHONE HYDROCHLORIDE 4 MG: 2 TABLET ORAL at 09:07

## 2021-07-09 RX ADMIN — MORPHINE SULFATE 15 MG: 15 TABLET, FILM COATED, EXTENDED RELEASE ORAL at 12:27

## 2021-07-09 RX ADMIN — TAMSULOSIN HYDROCHLORIDE 0.4 MG: 0.4 CAPSULE ORAL at 04:52

## 2021-07-09 RX ADMIN — RIVAROXABAN 15 MG: 15 TABLET, FILM COATED ORAL at 17:43

## 2021-07-09 RX ADMIN — MORPHINE SULFATE 15 MG: 15 TABLET, FILM COATED, EXTENDED RELEASE ORAL at 04:52

## 2021-07-09 RX ADMIN — RIVAROXABAN 15 MG: 15 TABLET, FILM COATED ORAL at 09:07

## 2021-07-09 ASSESSMENT — PAIN DESCRIPTION - PAIN TYPE
TYPE: ACUTE PAIN;SURGICAL PAIN
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
## 2021-07-09 NOTE — CARE PLAN
The patient is Stable - Low risk of patient condition declining or worsening    Shift Goals  Clinical Goals: safety and pain control  Patient Goals: pain control  Family Goals: No family present    Progress made toward(s) clinical / shift goals:  Hourly rounding.  Non-skid socks. Bed locked & in low position. Personal belongings and call light  within reach. .       Taught pt 0-10 pain scale and  non pharmacological method of pain mgt, encouraged to verbalize when in pain. Administered PRN pain med as needed.

## 2021-07-10 ENCOUNTER — APPOINTMENT (OUTPATIENT)
Dept: RADIOLOGY | Facility: MEDICAL CENTER | Age: 56
DRG: 956 | End: 2021-07-10
Attending: NURSE PRACTITIONER
Payer: COMMERCIAL

## 2021-07-10 LAB
ANION GAP SERPL CALC-SCNC: 9 MMOL/L (ref 7–16)
BASOPHILS # BLD AUTO: 0.9 % (ref 0–1.8)
BASOPHILS # BLD: 0.05 K/UL (ref 0–0.12)
BUN SERPL-MCNC: 14 MG/DL (ref 8–22)
CALCIUM SERPL-MCNC: 8.7 MG/DL (ref 8.5–10.5)
CHLORIDE SERPL-SCNC: 105 MMOL/L (ref 96–112)
CO2 SERPL-SCNC: 24 MMOL/L (ref 20–33)
COMMENT 1642: NORMAL
CREAT SERPL-MCNC: 0.66 MG/DL (ref 0.5–1.4)
EOSINOPHIL # BLD AUTO: 0.33 K/UL (ref 0–0.51)
EOSINOPHIL NFR BLD: 5.7 % (ref 0–6.9)
ERYTHROCYTE [DISTWIDTH] IN BLOOD BY AUTOMATED COUNT: 62.4 FL (ref 35.9–50)
GLUCOSE SERPL-MCNC: 103 MG/DL (ref 65–99)
HCT VFR BLD AUTO: 39 % (ref 42–52)
HGB BLD-MCNC: 11.2 G/DL (ref 14–18)
HYPOCHROMIA BLD QL SMEAR: ABNORMAL
IMM GRANULOCYTES # BLD AUTO: 0.04 K/UL (ref 0–0.11)
IMM GRANULOCYTES NFR BLD AUTO: 0.7 % (ref 0–0.9)
LYMPHOCYTES # BLD AUTO: 1.54 K/UL (ref 1–4.8)
LYMPHOCYTES NFR BLD: 26.8 % (ref 22–41)
MAGNESIUM SERPL-MCNC: 2 MG/DL (ref 1.5–2.5)
MCH RBC QN AUTO: 28.9 PG (ref 27–33)
MCHC RBC AUTO-ENTMCNC: 28.7 G/DL (ref 33.7–35.3)
MCV RBC AUTO: 100.8 FL (ref 81.4–97.8)
MONOCYTES # BLD AUTO: 0.44 K/UL (ref 0–0.85)
MONOCYTES NFR BLD AUTO: 7.7 % (ref 0–13.4)
MORPHOLOGY BLD-IMP: NORMAL
NEUTROPHILS # BLD AUTO: 3.35 K/UL (ref 1.82–7.42)
NEUTROPHILS NFR BLD: 58.2 % (ref 44–72)
NRBC # BLD AUTO: 0 K/UL
NRBC BLD-RTO: 0 /100 WBC
PHOSPHATE SERPL-MCNC: 3.5 MG/DL (ref 2.5–4.5)
PLATELET # BLD AUTO: 195 K/UL (ref 164–446)
PLATELET BLD QL SMEAR: NORMAL
PMV BLD AUTO: 9.9 FL (ref 9–12.9)
POTASSIUM SERPL-SCNC: 4.2 MMOL/L (ref 3.6–5.5)
RBC # BLD AUTO: 3.87 M/UL (ref 4.7–6.1)
RBC BLD AUTO: PRESENT
SODIUM SERPL-SCNC: 138 MMOL/L (ref 135–145)
WBC # BLD AUTO: 5.8 K/UL (ref 4.8–10.8)

## 2021-07-10 PROCEDURE — 80048 BASIC METABOLIC PNL TOTAL CA: CPT

## 2021-07-10 PROCEDURE — A9270 NON-COVERED ITEM OR SERVICE: HCPCS | Performed by: NURSE PRACTITIONER

## 2021-07-10 PROCEDURE — 83735 ASSAY OF MAGNESIUM: CPT

## 2021-07-10 PROCEDURE — 700102 HCHG RX REV CODE 250 W/ 637 OVERRIDE(OP): Performed by: NURSE PRACTITIONER

## 2021-07-10 PROCEDURE — 770006 HCHG ROOM/CARE - MED/SURG/GYN SEMI*

## 2021-07-10 PROCEDURE — 85025 COMPLETE CBC W/AUTO DIFF WBC: CPT

## 2021-07-10 PROCEDURE — 84100 ASSAY OF PHOSPHORUS: CPT

## 2021-07-10 PROCEDURE — 71045 X-RAY EXAM CHEST 1 VIEW: CPT

## 2021-07-10 PROCEDURE — 36415 COLL VENOUS BLD VENIPUNCTURE: CPT

## 2021-07-10 RX ORDER — MORPHINE SULFATE 15 MG/1
15 TABLET, FILM COATED, EXTENDED RELEASE ORAL EVERY 12 HOURS
Status: DISCONTINUED | OUTPATIENT
Start: 2021-07-10 | End: 2021-07-12

## 2021-07-10 RX ADMIN — FUROSEMIDE 20 MG: 20 TABLET ORAL at 05:59

## 2021-07-10 RX ADMIN — HYDROMORPHONE HYDROCHLORIDE 4 MG: 2 TABLET ORAL at 18:06

## 2021-07-10 RX ADMIN — HYDROMORPHONE HYDROCHLORIDE 4 MG: 2 TABLET ORAL at 06:00

## 2021-07-10 RX ADMIN — FAMOTIDINE 20 MG: 20 TABLET ORAL at 18:06

## 2021-07-10 RX ADMIN — METAXALONE 400 MG: 800 TABLET ORAL at 02:18

## 2021-07-10 RX ADMIN — GABAPENTIN 400 MG: 400 CAPSULE ORAL at 18:00

## 2021-07-10 RX ADMIN — METAXALONE 400 MG: 800 TABLET ORAL at 20:58

## 2021-07-10 RX ADMIN — FAMOTIDINE 20 MG: 20 TABLET ORAL at 05:59

## 2021-07-10 RX ADMIN — MORPHINE SULFATE 15 MG: 15 TABLET, FILM COATED, EXTENDED RELEASE ORAL at 18:06

## 2021-07-10 RX ADMIN — GABAPENTIN 400 MG: 400 CAPSULE ORAL at 11:59

## 2021-07-10 RX ADMIN — RIVAROXABAN 15 MG: 15 TABLET, FILM COATED ORAL at 18:06

## 2021-07-10 RX ADMIN — HYDROMORPHONE HYDROCHLORIDE 4 MG: 2 TABLET ORAL at 00:05

## 2021-07-10 RX ADMIN — TAMSULOSIN HYDROCHLORIDE 0.4 MG: 0.4 CAPSULE ORAL at 06:00

## 2021-07-10 RX ADMIN — MORPHINE SULFATE 15 MG: 15 TABLET, FILM COATED, EXTENDED RELEASE ORAL at 06:00

## 2021-07-10 RX ADMIN — RIVAROXABAN 15 MG: 15 TABLET, FILM COATED ORAL at 08:34

## 2021-07-10 RX ADMIN — GABAPENTIN 400 MG: 400 CAPSULE ORAL at 05:59

## 2021-07-10 RX ADMIN — HYDROMORPHONE HYDROCHLORIDE 4 MG: 2 TABLET ORAL at 11:59

## 2021-07-10 RX ADMIN — METAXALONE 400 MG: 800 TABLET ORAL at 05:59

## 2021-07-10 ASSESSMENT — PAIN DESCRIPTION - PAIN TYPE
TYPE: ACUTE PAIN
TYPE: ACUTE PAIN

## 2021-07-10 ASSESSMENT — ENCOUNTER SYMPTOMS
ABDOMINAL PAIN: 0
SENSORY CHANGE: 0
FEVER: 0
SPEECH CHANGE: 0
CONSTIPATION: 0
HEADACHES: 0
MYALGIAS: 1
BLURRED VISION: 0
DIZZINESS: 0
CHILLS: 0
SHORTNESS OF BREATH: 0
VOMITING: 0
CONSTITUTIONAL NEGATIVE: 1
FOCAL WEAKNESS: 0
GASTROINTESTINAL NEGATIVE: 1
NEUROLOGICAL NEGATIVE: 1
DOUBLE VISION: 0
TINGLING: 0
NAUSEA: 0

## 2021-07-10 NOTE — CARE PLAN
The patient is Stable - Low risk of patient condition declining or worsening    Shift Goals  Clinical Goals: pain control  Patient Goals: pain control  Family Goals: pain control by time of discharge     Progress made toward(s) clinical / shift goals:  Went over POC.    Patient is not progressing towards the following goals:

## 2021-07-10 NOTE — CARE PLAN
The patient is Stable - Low risk of patient condition declining or worsening    Shift Goals  Clinical Goals: pain control by time of discharge  Patient Goals: pain control by time of discharge  Family Goals: pain control by time of discharge     Progress made toward(s) clinical / shift goals:  Taught pt 0-10 pain scale and  non pharmacological method of pain mgt, encouraged to verbalize when in pain. Administered PRN pain med as needed.     Patient turn self,kept dry and clean, mepilex, waffle mattress overlay, barrier paste and pillows on bony prominences to prevent skin breakdown
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
## 2021-07-10 NOTE — PROGRESS NOTES
"Orthopaedic Progress Note    Author: MARCY Tran Date & Time created: 710/2021   12:47 PM     Interval Events:  Patient doing well   RUE splint in place without issue   Staples out R wrist today    POD#18 S/P   1.  Open reduction and internal fixation of bicondylar tibial plateau fracture.  2.  Open reduction and internal fixation of right distal radius fracture.  3.  Application of allograft, right wrist  POD#25 S/P   1.  Closed retrograde intramedullary lock nailing right femur   2.  Left percutaneous fluoroscopically guided iliosacral screw placement   3.  Right anterior inferior iliac spine screw for iliac wing fracture      Review of Systems   Constitutional: Negative.    Cardiovascular: Chest pain: rib fx    Gastrointestinal: Negative.    Musculoskeletal:        Pain well controlled    Neurological: Negative.      Hemodynamics:  /61   Pulse 84   Temp 36.6 °C (97.8 °F) (Temporal)   Resp 16   Ht 1.753 m (5' 9.02\")   Wt 79.6 kg (175 lb 7.8 oz)   SpO2 95%      No Active Precaution Orders    Respiratory:    Respiration: 16, Pulse Oximetry: 95 %     Work Of Breathing / Effort: Within Normal Limits       Physical Exam   HENT:   Head: Normocephalic and atraumatic.   Pulmonary/Chest: He exhibits tenderness (rib fx ).   Musculoskeletal:      Comments: RUE splint CDI, DNVI, moves all fingers, cap refill <2 sec. RLE dressing CDI, DNVI, cap refill <2 sec.      Labs:  None today    Medical Decision Making/Problem List:    Active Hospital Problems    Diagnosis    • Hypotension [I95.9]    • Status post cardiac surgery [Z98.890]    • Respiratory failure following trauma ([facility]) [J96.90]    • Closed fracture of right femur ([facility]) [S72.91XA]    • Closed fracture of distal end of right radius [S52.501A]    • Screening examination for infectious disease [Z11.9]    • Contraindication to deep vein thrombosis (DVT) prophylaxis [Z53.09]    • Eyelid laceration, right, initial encounter [S01.111A]    • Wedge fracture " of lumbar vertebra (HCC) [S32.000A]    • 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  Clearance for lovenox/heparin: per trauma   Weight Bearing Status: NWB RUE and RLE  Wounds & Drains: dressings changed every other day by nursing, splints left in place  Disposition and Follow-up: per therapy recs   Follow-Up: needs appointment with Dr. Odonnell or Dr. Rodríguez next week at [facility] for re-evaluation and radiographs.

## 2021-07-11 PROCEDURE — A9270 NON-COVERED ITEM OR SERVICE: HCPCS | Performed by: NURSE PRACTITIONER

## 2021-07-11 PROCEDURE — 700102 HCHG RX REV CODE 250 W/ 637 OVERRIDE(OP): Performed by: NURSE PRACTITIONER

## 2021-07-11 PROCEDURE — 770006 HCHG ROOM/CARE - MED/SURG/GYN SEMI*

## 2021-07-11 RX ADMIN — RIVAROXABAN 15 MG: 15 TABLET, FILM COATED ORAL at 08:34

## 2021-07-11 RX ADMIN — HYDROMORPHONE HYDROCHLORIDE 4 MG: 2 TABLET ORAL at 06:08

## 2021-07-11 RX ADMIN — HYDROMORPHONE HYDROCHLORIDE 4 MG: 2 TABLET ORAL at 12:46

## 2021-07-11 RX ADMIN — GABAPENTIN 400 MG: 400 CAPSULE ORAL at 06:09

## 2021-07-11 RX ADMIN — HYDROMORPHONE HYDROCHLORIDE 4 MG: 2 TABLET ORAL at 00:06

## 2021-07-11 RX ADMIN — FAMOTIDINE 20 MG: 20 TABLET ORAL at 17:41

## 2021-07-11 RX ADMIN — METAXALONE 400 MG: 800 TABLET ORAL at 17:41

## 2021-07-11 RX ADMIN — GABAPENTIN 400 MG: 400 CAPSULE ORAL at 17:41

## 2021-07-11 RX ADMIN — FAMOTIDINE 20 MG: 20 TABLET ORAL at 06:08

## 2021-07-11 RX ADMIN — HYDROMORPHONE HYDROCHLORIDE 4 MG: 2 TABLET ORAL at 18:47

## 2021-07-11 RX ADMIN — METAXALONE 400 MG: 800 TABLET ORAL at 06:09

## 2021-07-11 RX ADMIN — FUROSEMIDE 20 MG: 20 TABLET ORAL at 06:09

## 2021-07-11 RX ADMIN — TAMSULOSIN HYDROCHLORIDE 0.4 MG: 0.4 CAPSULE ORAL at 06:09

## 2021-07-11 RX ADMIN — MORPHINE SULFATE 15 MG: 15 TABLET, FILM COATED, EXTENDED RELEASE ORAL at 17:42

## 2021-07-11 RX ADMIN — MORPHINE SULFATE 15 MG: 15 TABLET, FILM COATED, EXTENDED RELEASE ORAL at 06:08

## 2021-07-11 RX ADMIN — RIVAROXABAN 15 MG: 15 TABLET, FILM COATED ORAL at 17:42

## 2021-07-11 ASSESSMENT — ENCOUNTER SYMPTOMS
ABDOMINAL PAIN: 0
SHORTNESS OF BREATH: 0
VOMITING: 0
SENSORY CHANGE: 0
FOCAL WEAKNESS: 0
GASTROINTESTINAL NEGATIVE: 1
DIZZINESS: 0
TINGLING: 0
NAUSEA: 0
NEUROLOGICAL NEGATIVE: 1
FEVER: 0
HEADACHES: 0
CONSTITUTIONAL NEGATIVE: 1
MYALGIAS: 1
SPEECH CHANGE: 0
CHILLS: 0

## 2021-07-11 ASSESSMENT — PAIN DESCRIPTION - PAIN TYPE
TYPE: ACUTE PAIN
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
## 2021-07-11 NOTE — CARE PLAN
Problem: Pain - Standard  Goal: Alleviation of pain or a reduction in pain to the patient’s comfort goal  Outcome: Progressing     Problem: Knowledge Deficit - Standard  Goal: Patient and family/care givers will demonstrate understanding of plan of care, disease process/condition, diagnostic tests and medications  Outcome: Progressing     Problem: Skin Integrity  Goal: Skin integrity is maintained or improved  Outcome: Progressing   The patient is Stable - Low risk of patient condition declining or worsening    Shift Goals  Clinical Goals: pain management  Patient Goals: comfort  Family Goals: pain control by time of discharge     Progress made toward(s) clinical / shift goals:  PRN pain meds in use.    Patient is not progressing towards the following goals:

## 2021-07-11 NOTE — PROGRESS NOTES
"Orthopaedic Progress Note    Author: MARCY Tran Date & Time created: 7/11/2021   0914     Interval Events:  Patient doing well   RUE splint in place without issue   All staples out  interval XRs planned for Tuesday    POD#18 S/P   1.  Open reduction and internal fixation of bicondylar tibial plateau fracture.  2.  Open reduction and internal fixation of right distal radius fracture.  3.  Application of allograft, right wrist  POD#25 S/P   1.  Closed retrograde intramedullary lock nailing right femur   2.  Left percutaneous fluoroscopically guided iliosacral screw placement   3.  Right anterior inferior iliac spine screw for iliac wing fracture    Review of Systems   Constitutional: Negative.    Cardiovascular: Chest pain: rib fx    Gastrointestinal: Negative.    Musculoskeletal:        Pain well controlled    Neurological: Negative.      Hemodynamics:  /62   Pulse 70   Temp 36.6 °C (97.9 °F) (Temporal)   Resp 16   Ht 1.753 m (5' 9.02\")   Wt 79.6 kg (175 lb 7.8 oz)   SpO2 95%      No Active Precaution Orders    Physical Exam   HENT:   Head: Normocephalic and atraumatic.   Pulmonary/Chest: He exhibits tenderness (rib fx ).   Musculoskeletal:      Comments: RUE splint CDI, DNVI, moves all fingers, cap refill <2 sec. RLE dressing CDI, DNVI, cap refill <2 sec.      Labs:  None today    Medical Decision Making/Problem List:    Active Hospital Problems    Diagnosis    • Hypotension [I95.9]    • Status post cardiac surgery [Z98.890]    • Respiratory failure following trauma ([facility]) [J96.90]    • Closed fracture of right femur ([facility]) [S72.91XA]    • Closed fracture of distal end of right radius [S52.501A]    • Screening examination for infectious disease [Z11.9]    • Contraindication to deep vein thrombosis (DVT) prophylaxis [Z53.09]    • Eyelid laceration, right, initial encounter [S01.111A]    • Wedge fracture of lumbar vertebra ([facility]) [S32.000A]    • Occlusion of right carotid artery [I65.21]    • " Pneumothorax on right [J93.9]    • Multiple pelvic fractures (HCC) [S32.82XA]    • Closed fracture of multiple ribs [S22.49XA]    • Abnormal CT of the abdomen [R93.5]    • 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  Clearance for lovenox/heparin: per trauma   Weight Bearing Status: NWB RUE and RLE  Wounds & Drains: dressings changed every other day by nursing, splints left in place  Disposition and Follow-up: per therapy recs   Follow-Up: needs appointment with Dr. Odonnell or Dr. Rodríguez Wednesday at [facility] for re-evaluation and radiographs.

## 2021-07-12 PROBLEM — D72.829 LEUKOCYTOSIS: Status: RESOLVED | Noted: 2021-06-22 | Resolved: 2021-07-12

## 2021-07-12 PROCEDURE — A9270 NON-COVERED ITEM OR SERVICE: HCPCS | Performed by: NURSE PRACTITIONER

## 2021-07-12 PROCEDURE — 700102 HCHG RX REV CODE 250 W/ 637 OVERRIDE(OP): Performed by: NURSE PRACTITIONER

## 2021-07-12 PROCEDURE — 97535 SELF CARE MNGMENT TRAINING: CPT

## 2021-07-12 PROCEDURE — 770006 HCHG ROOM/CARE - MED/SURG/GYN SEMI*

## 2021-07-12 PROCEDURE — 97530 THERAPEUTIC ACTIVITIES: CPT

## 2021-07-12 RX ORDER — MORPHINE SULFATE 15 MG/1
15 TABLET, FILM COATED, EXTENDED RELEASE ORAL EVERY 12 HOURS
Status: COMPLETED | OUTPATIENT
Start: 2021-07-12 | End: 2021-07-12

## 2021-07-12 RX ORDER — HYDROMORPHONE HYDROCHLORIDE 2 MG/1
2 TABLET ORAL EVERY 6 HOURS PRN
Status: DISCONTINUED | OUTPATIENT
Start: 2021-07-12 | End: 2021-07-14 | Stop reason: HOSPADM

## 2021-07-12 RX ORDER — MORPHINE SULFATE 15 MG/1
15 TABLET, FILM COATED, EXTENDED RELEASE ORAL
Status: DISCONTINUED | OUTPATIENT
Start: 2021-07-13 | End: 2021-07-12

## 2021-07-12 RX ADMIN — TAMSULOSIN HYDROCHLORIDE 0.4 MG: 0.4 CAPSULE ORAL at 06:03

## 2021-07-12 RX ADMIN — MORPHINE SULFATE 15 MG: 15 TABLET, FILM COATED, EXTENDED RELEASE ORAL at 17:24

## 2021-07-12 RX ADMIN — RIVAROXABAN 15 MG: 15 TABLET, FILM COATED ORAL at 08:24

## 2021-07-12 RX ADMIN — FAMOTIDINE 20 MG: 20 TABLET ORAL at 16:29

## 2021-07-12 RX ADMIN — HYDROMORPHONE HYDROCHLORIDE 4 MG: 2 TABLET ORAL at 14:38

## 2021-07-12 RX ADMIN — METAXALONE 400 MG: 800 TABLET ORAL at 00:48

## 2021-07-12 RX ADMIN — GABAPENTIN 400 MG: 400 CAPSULE ORAL at 06:02

## 2021-07-12 RX ADMIN — RIVAROXABAN 15 MG: 15 TABLET, FILM COATED ORAL at 16:29

## 2021-07-12 RX ADMIN — FUROSEMIDE 20 MG: 20 TABLET ORAL at 06:02

## 2021-07-12 RX ADMIN — MORPHINE SULFATE 15 MG: 15 TABLET, FILM COATED, EXTENDED RELEASE ORAL at 06:03

## 2021-07-12 RX ADMIN — METAXALONE 400 MG: 800 TABLET ORAL at 06:03

## 2021-07-12 RX ADMIN — FAMOTIDINE 20 MG: 20 TABLET ORAL at 06:03

## 2021-07-12 RX ADMIN — HYDROMORPHONE HYDROCHLORIDE 4 MG: 2 TABLET ORAL at 00:48

## 2021-07-12 RX ADMIN — HYDROMORPHONE HYDROCHLORIDE 4 MG: 2 TABLET ORAL at 08:28

## 2021-07-12 ASSESSMENT — ENCOUNTER SYMPTOMS
MYALGIAS: 1
SHORTNESS OF BREATH: 0
NEUROLOGICAL NEGATIVE: 1
ABDOMINAL PAIN: 0
FOCAL WEAKNESS: 0
DIZZINESS: 0
FEVER: 0
DOUBLE VISION: 0
BLURRED VISION: 0
HEADACHES: 0
VOMITING: 0
SENSORY CHANGE: 0
CHILLS: 0
CONSTITUTIONAL NEGATIVE: 1
CONSTIPATION: 0
GASTROINTESTINAL NEGATIVE: 1
TINGLING: 0
NAUSEA: 0
SPEECH CHANGE: 0

## 2021-07-12 ASSESSMENT — COGNITIVE AND FUNCTIONAL STATUS - GENERAL
DRESSING REGULAR LOWER BODY CLOTHING: A LOT
TURNING FROM BACK TO SIDE WHILE IN FLAT BAD: UNABLE
STANDING UP FROM CHAIR USING ARMS: A LOT
MOBILITY SCORE: 7
WALKING IN HOSPITAL ROOM: TOTAL
PERSONAL GROOMING: A LITTLE
DRESSING REGULAR UPPER BODY CLOTHING: A LOT
MOVING FROM LYING ON BACK TO SITTING ON SIDE OF FLAT BED: UNABLE
TOILETING: A LOT
MOVING TO AND FROM BED TO CHAIR: UNABLE
SUGGESTED CMS G CODE MODIFIER DAILY ACTIVITY: CK
SUGGESTED CMS G CODE MODIFIER MOBILITY: CM
HELP NEEDED FOR BATHING: A LOT
CLIMB 3 TO 5 STEPS WITH RAILING: TOTAL
DAILY ACTIVITIY SCORE: 15

## 2021-07-12 ASSESSMENT — PAIN DESCRIPTION - PAIN TYPE
TYPE: SURGICAL PAIN
TYPE: ACUTE PAIN

## 2021-07-12 ASSESSMENT — GAIT ASSESSMENTS: GAIT LEVEL OF ASSIST: UNABLE TO PARTICIPATE

## 2021-07-12 NOTE — PROGRESS NOTES
"Orthopaedic Progress Note    Author: MARCY Tran Date & Time created: 7/12/2021   0839     Interval Events:  Patient doing well   RUE splint in place without issue   All staples out  interval XRs planned for Wednesday    POD#19 S/P   1.  Open reduction and internal fixation of bicondylar tibial plateau fracture.  2.  Open reduction and internal fixation of right distal radius fracture.  3.  Application of allograft, right wrist  POD#26 S/P   1.  Closed retrograde intramedullary lock nailing right femur   2.  Left percutaneous fluoroscopically guided iliosacral screw placement   3.  Right anterior inferior iliac spine screw for iliac wing fracture    Review of Systems   Constitutional: Negative.    Cardiovascular: Chest pain: rib fx    Gastrointestinal: Negative.    Musculoskeletal:        Pain well controlled    Neurological: Negative.      Hemodynamics:  /73   Pulse 73   Temp 36.6 °C (97.9 °F) (Temporal)   Resp 18   Ht 1.753 m (5' 9.02\")   Wt 79.6 kg (175 lb 7.8 oz)   SpO2 94%      No Active Precaution Orders    Physical Exam   HENT:   Head: Normocephalic and atraumatic.   Pulmonary/Chest: He exhibits tenderness (rib fx ).   Musculoskeletal:      Comments: RUE splint CDI, DNVI, moves all fingers, cap refill <2 sec. RLE dressing CDI, DNVI, cap refill <2 sec.      Labs:  None today    Medical Decision Making/Problem List:    Active Hospital Problems    Diagnosis    • Hypotension [I95.9]    • Status post cardiac surgery [Z98.890]    • Respiratory failure following trauma ([facility]) [J96.90]    • Closed fracture of right femur ([facility]) [S72.91XA]    • Closed fracture of distal end of right radius [S52.501A]    • Screening examination for infectious disease [Z11.9]    • Contraindication to deep vein thrombosis (DVT) prophylaxis [Z53.09]    • Eyelid laceration, right, initial encounter [S01.111A]    • Wedge fracture of lumbar vertebra ([facility]) [S32.000A]    • Occlusion of right carotid artery [I65.21]    • " Pneumothorax on right [J93.9]    • Multiple pelvic fractures (HCC) [S32.82XA]    • Closed fracture of multiple ribs [S22.49XA]    • Abnormal CT of the abdomen [R93.5]    • 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  Clearance for lovenox/heparin: per trauma   Weight Bearing Status: NWB RUE and RLE  Wounds & Drains: dressings changed every other day by nursing, splints left in place  Disposition and Follow-up: per therapy recs   Follow-Up: needs appointment with Dr. Odonnell or Dr. Rodríguez Wednesday at [facility] for re-evaluation and radiographs.

## 2021-07-12 NOTE — THERAPY
Occupational Therapy  Daily Treatment     Patient Name: Jesus Gorman  Age:  55 y.o., Sex:  male  Medical Record #: [id number]  Today's Date: 7/12/2021     Precautions  Precautions: Fall Risk, Non Weight Bearing Right Lower Extremity, Platform Weight Bearing Right Upper Extremity, Immobilizer Right Lower Extremity, Spinal / Back Precautions , TLSO (Thoracolumbosacral orthosis)  Comments: TLSO EOB, immobilizer R knee    Assessment    Pt seen for OT session. Progressing with activity tolerance and balance, but continues to req max v/cs to maintain RUE NWB during txfs and while using w/c. Able to assist more with don/doff of TLSO and SB txf. Continues to demo deficits with sequencing lateral scoot with LUE/LLE only. Continues to be limited by decreased functional mobility, activity tolerance, cognition, sensation, strength, AROM, coordination, balance, adherence to precautions, and pain which are currently affecting pt's ability to complete ADLs/IADLs at baseline. Will continue to follow.     Plan    Continue current treatment plan.    DC Equipment Recommendations: Unable to determine at this time  Discharge Recommendations: Recommend post-acute placement for additional occupational therapy services prior to discharge home     Objective     07/12/21 1501   Precautions   Precautions Fall Risk;Non Weight Bearing Right Lower Extremity;Platform Weight Bearing Right Upper Extremity;Immobilizer Right Lower Extremity;Spinal / Back Precautions ;TLSO (Thoracolumbosacral orthosis)   Comments TLSO EOB, immobilizer R knee   Vitals   O2 Delivery Device None - Room Air   Pain 0 - 10 Group   Location Leg;Rib Cage   Location Orientation Right;Left   Therapist Pain Assessment Post Activity Pain Same as Prior to Activity;During Activity;Nurse Notified  (not quantified)   Cognition    Cognition / Consciousness X   Level of Consciousness Alert   Ability To Follow Commands 1 Step   Safety Awareness Impaired   New Learning Impaired    Attention Impaired   Sequencing Impaired   Initiation Impaired   Comments pleasant and cooperative; req max v/cs for WB precautions. verbose req redirection   Passive ROM Upper Body   Passive ROM Upper Body X   Comments R wrist in splint   Active ROM Upper Body   Active ROM Upper Body  X   Dominant Hand Right   Comments LUE WFL, RUE limited by brace on wrist, full AROM of fingers.   Strength Upper Body   Upper Body Strength  X   Comments LUE WFL; RUE limited by brace req max v/cs for NWB. decreased  strength. encouraged to continue use for ADLs   Sensation Upper Body   Upper Extremity Sensation  X   Comments LUE WFL, RUE improving   Supine Upper Body Exercises   Supine Upper Body Exercises Yes   Other Exercise over head triceps for RUE only   Comments encouraged continued use of R fingers/hand for light ADLs   Neuro-Muscular Treatments   Neuro-Muscular Treatments Anterior weight shift;Postural Facilitation;Verbal Cuing;Weight Shift Left;Weight Shift Right;Facilitation   Comments for lateral scooting in prep for SB BSC txf   Balance   Sitting Balance (Static) Fair   Sitting Balance (Dynamic) Fair -   Weight Shift Sitting Fair   Skilled Intervention Verbal Cuing;Tactile Cuing;Sequencing;Postural Facilitation;Facilitation;Compensatory Strategies   Comments with SB txf   Bed Mobility    Supine to Sit Moderate Assist   Sit to Supine Maximal Assist  (torso and BLEs)   Scooting Moderate Assist   Rolling Moderate Assist to Lt.;Moderate Assist to Rt.   Skilled Intervention Verbal Cuing;Tactile Cuing;Facilitation;Compensatory Strategies;Sequencing   Comments max v/cs for logroll   Activities of Daily Living   Grooming Supervision;Seated  (oral care in w/c at sink; edu on adaptive technique)   Upper Body Dressing Maximal Assist  (pt assisted with straps and doff)   Lower Body Dressing Moderate Assist   Skilled Intervention Compensatory Strategies;Facilitation;Tactile Cuing;Verbal Cuing;Sequencing   Comments req v/cs to  "maintain WB   How much help from another person does the patient currently need...   Putting on and taking off regular lower body clothing? 2   Bathing (including washing, rinsing, and drying)? 2   Toileting, which includes using a toilet, bedpan, or urinal? 2   Putting on and taking off regular upper body clothing? 2   Taking care of personal grooming such as brushing teeth? 3   Eating meals? 4   6 Clicks Daily Activity Score 15   Functional Mobility   Bed, Chair, Wheelchair Transfer Moderate Assist  (mod A to w/c and max A BTB)   Transfer Method Slide Board   Mobility w/ SB from bed<>w/c   Activity Tolerance   Comments increased tolerance in w/c ~4 hrs with waffle cushion. [facility] on adjustment   Patient / Family Goals   Patient / Family Goal #1 \"To get out of bed\"   Short Term Goals   Short Term Goal # 1 Pt will complete ADL transfers with min A   Goal Outcome # 1 Progressing slower than expected   Short Term Goal # 2 Pt will complete UB dressing with min A using jodi technique    Goal Outcome # 2 Progressing slower than expected   Short Term Goal # 3 Pt will complete seated in w/c grooming with supv    Goal Outcome # 3 Progressing as expected   Short Term Goal # 4 Pt will increase R composite flexion/grasp to 100% to incorporate as stabilizer during bimanual functional tasks    Goal Outcome # 4 Progressing as expected   Education Group   Education Provided Upper Extremity Range of Motion;Transfers;Activities of Daily Living;Weight Bearing Precautions;Back Safety;Brace Wear and Care   Back Safety Patient Response Patient;Acceptance;Explanation;Reinforcement Needed;Action Demonstration;Demonstration   Upper Ext ROM Patient Response Patient;Acceptance;Explanation;Reinforcement Needed;Action Demonstration;Demonstration   Brace Wear & Care Patient Response Patient;Acceptance;Explanation;Reinforcement Needed;Action Demonstration;Demonstration   Transfers Patient Response Patient;Acceptance;Explanation;Reinforcement " Needed;Verbal Demonstration   ADL Patient Response Patient;Acceptance;Explanation;Verbal Demonstration;Reinforcement Needed;Demonstration   Weight Bearing Precautions Patient Response Patient;Acceptance;Explanation;Demonstration;Verbal Demonstration;Reinforcement Needed
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
## 2021-07-12 NOTE — THERAPY
Physical Therapy   Daily Treatment     Patient Name: Jesus Gorman  Age:  55 y.o., Sex:  male  Medical Record #: [id number]  Today's Date: 7/12/2021     Precautions: (P) Fall Risk, Non Weight Bearing Right Lower Extremity, Non Weight Bearing Right Upper Extremity, TLSO (Thoracolumbosacral orthosis), Spinal / Back Precautions , Immobilizer Right Lower Extremity    Assessment  Pt slowly progressing towards therapy goals with improvements in participation, activity tolerance and mobility today. Able to initiate movement in transfers and mobility with reduced anxiety and reduced fear/anticipation of pain. Pt requires MOD x 2 for bed mobility, MOD x 2 for bed<>w/c slide board transfer, SPV for w/c propulsion (LUE/LLE only with NWB RUE/RLE) 200 feet with heavy cueing/education for all. Difficulty maintaining WB restrictions despite heavy education. Difficulty following one step commands. No overt LOB. Remained up in w/c end of session with nsg trained to return pt to bed with 2 person assist using slide board. Continue to progress POC as tolerated.     Plan    Continue current treatment plan.    DC Equipment Recommendations:  Unable to determine at this time  Discharge Recommendations:  Recommend post-acute placement for additional physical therapy services prior to discharge home      Subjective  Pt agreeable to PT session. Talkative, requires redirection. Pain with mobility.      Objective  Pt requires MOD x 2 for bed mobility, MOD x 2 for bed<>w/c slide board transfer, SPV for w/c propulsion (LUE/LLE only with NWB RUE/RLE) 200 feet with heavy cueing/education for all. Difficulty maintaining WB restrictions despite heavy education. Difficulty following one step commands. No overt LOB.      07/12/21 1041   Total Time Spent   Total Time Spent (Mins) 40   Charge Group   Charges  Yes   PT Therapeutic Activities 3   Precautions   Precautions Fall Risk;Non Weight Bearing Right Lower Extremity;Non Weight Bearing Right Upper  Extremity;TLSO (Thoracolumbosacral orthosis);Spinal / Back Precautions ;Immobilizer Right Lower Extremity   Comments TLSO EOB, immobilizer R knee, anxious/talkative   Pain 0 - 10 Group   Therapist Pain Assessment During Activity;Post Activity  (reports moderate pain, not quantified)   Non Verbal Descriptors   Non Verbal Scale  Calm   Cognition    Level of Consciousness Alert   Ability To Follow Commands 1 Step   Comments Distracted, talkative, requires redirection throughout   Neuro-Muscular Treatments   Neuro-Muscular Treatments Anterior weight shift;Postural Facilitation;Weight Shift Left;Weight Shift Right;Verbal Cuing;Tactile Cuing;Sequencing   Other Treatments   Other Treatments Provided Continues to require heavy cueing/education   Balance   Sitting Balance (Static) Fair   Sitting Balance (Dynamic) Fair -   Weight Shift Sitting Poor   Weight Shift Standing Absent   Skilled Intervention Postural Facilitation;Sequencing   Comments Slide board transfer only   Gait Analysis   Gait Level Of Assist Unable to Participate   Bed Mobility    Supine to Sit Moderate Assist   Sit to Supine   (up to w/c)   Scooting Moderate Assist   Rolling Moderate Assist to Lt.   Skilled Intervention Verbal Cuing;Sequencing   Comments Cueing throughout, MAX   Functional Mobility   Sit to Stand Unable to Participate   Bed, Chair, Wheelchair Transfer Moderate Assist  (slide board)   Transfer Method Slide Board   Mobility bed<>w/c   Wheelchair Assist Supervised  (cueing for RUE NWB)   Distance Wheelchair (Feet or Distance) 200   Skilled Intervention Verbal Cuing;Tactile Cuing;Postural Facilitation;Sequencing;Compensatory Strategies   Comments Cueing for RUE/RLE NWB and only use LUE/LLE   How much difficulty does the patient currently have...   Turning over in bed (including adjusting bedclothes, sheets and blankets)? 1   Sitting down on and standing up from a chair with arms (e.g., wheelchair, bedside commode, etc.) 1   Moving from lying on  back to sitting on the side of the bed? 1   How much help from another person does the patient currently need...   Moving to and from a bed to a chair (including a wheelchair)? 2   Need to walk in a hospital room? 1   Climbing 3-5 steps with a railing? 1   6 clicks Mobility Score 7   Activity Tolerance   Sitting in Chair up to w/c   Sitting Edge of Bed >20   Standing NT   Comments pain, fatigue, anxiety limiting   Patient / Family Goals    Patient / Family Goal #1 to improve activity tolerance    Goal #1 Outcome Progressing as expected   Short Term Goals    Short Term Goal # 1 Pt will perform supine<>sit from flat HOB/no railing with supervision within 6 visits to ensure independent mobility at home.   Goal Outcome # 1 goal not met   Short Term Goal # 2 Pt will perform sit<>stand with hemiwalker vs crutch with supervision within 6 visits to ensure progression to independence.    Goal Outcome # 2 Goal not met   Short Term Goal # 3 Pt will perform seated slideboard transfer with min A within 6 visits to ensure progression to independence within 6 vistis.    Goal Outcome # 3 Progressing as expected   Short Term Goal # 4 Pt will self propel w/c x 150ft with left Ue/LE with supervision within 6 visits to ensure progression to independence.    Goal Outcome # 4 Goal met   Short Term Goal # 5 Pt will ascend/descend 3 stairs with left UE support and min A within 6 visits to enter/exit home.    Goal Outcome # 5 Goal not met   Education Group   Education Provided Role of Physical Therapist   Spine Precautions Patient Response Patient;Acceptance;Explanation;Reinforcement Needed   Role of Physical Therapist Patient Response Patient;Acceptance;Explanation;Reinforcement Needed   Anticipated Discharge Equipment and Recommendations   DC Equipment Recommendations Unable to determine at this time   Discharge Recommendations Recommend post-acute placement for additional physical therapy services prior to discharge home    Interdisciplinary Plan of Care Collaboration   IDT Collaboration with  Nursing   Patient Position at End of Therapy In Bed;Call Light within Reach   Collaboration Comments RN Updated   Session Information   Date / Session Number  7/12-8(2/3, 7/14)

## 2021-07-13 PROCEDURE — 700102 HCHG RX REV CODE 250 W/ 637 OVERRIDE(OP): Performed by: NURSE PRACTITIONER

## 2021-07-13 PROCEDURE — A9270 NON-COVERED ITEM OR SERVICE: HCPCS | Performed by: NURSE PRACTITIONER

## 2021-07-13 PROCEDURE — RXMED WILLOW AMBULATORY MEDICATION CHARGE: Performed by: NURSE PRACTITIONER

## 2021-07-13 PROCEDURE — 770006 HCHG ROOM/CARE - MED/SURG/GYN SEMI*

## 2021-07-13 RX ORDER — METAXALONE 800 MG/1
400 TABLET ORAL 3 TIMES DAILY PRN
Qty: 8 TABLET | Refills: 0 | Status: SHIPPED | OUTPATIENT
Start: 2021-07-13 | End: 2021-07-19

## 2021-07-13 RX ORDER — ACETAMINOPHEN 325 MG/1
650 TABLET ORAL EVERY 6 HOURS PRN
Status: DISCONTINUED | OUTPATIENT
Start: 2021-07-13 | End: 2021-07-14 | Stop reason: HOSPADM

## 2021-07-13 RX ORDER — ACETAMINOPHEN 325 MG/1
650 TABLET ORAL EVERY 6 HOURS PRN
Qty: 30 TABLET | Refills: 0 | COMMUNITY
Start: 2021-07-13 | End: 2024-02-06

## 2021-07-13 RX ADMIN — RIVAROXABAN 15 MG: 15 TABLET, FILM COATED ORAL at 08:25

## 2021-07-13 RX ADMIN — FAMOTIDINE 20 MG: 20 TABLET ORAL at 17:54

## 2021-07-13 RX ADMIN — HYDROMORPHONE HYDROCHLORIDE 2 MG: 2 TABLET ORAL at 06:00

## 2021-07-13 RX ADMIN — RIVAROXABAN 15 MG: 15 TABLET, FILM COATED ORAL at 17:54

## 2021-07-13 RX ADMIN — HYDROMORPHONE HYDROCHLORIDE 2 MG: 2 TABLET ORAL at 17:57

## 2021-07-13 RX ADMIN — GABAPENTIN 400 MG: 400 CAPSULE ORAL at 05:22

## 2021-07-13 RX ADMIN — HYDROMORPHONE HYDROCHLORIDE 2 MG: 2 TABLET ORAL at 12:00

## 2021-07-13 RX ADMIN — HYDROMORPHONE HYDROCHLORIDE 2 MG: 2 TABLET ORAL at 00:12

## 2021-07-13 RX ADMIN — TAMSULOSIN HYDROCHLORIDE 0.4 MG: 0.4 CAPSULE ORAL at 05:22

## 2021-07-13 RX ADMIN — FAMOTIDINE 20 MG: 20 TABLET ORAL at 05:22

## 2021-07-13 ASSESSMENT — ENCOUNTER SYMPTOMS
VOMITING: 0
MYALGIAS: 1
CONSTIPATION: 0
ABDOMINAL PAIN: 0
SPEECH CHANGE: 0
SHORTNESS OF BREATH: 0
HEADACHES: 0
NAUSEA: 0

## 2021-07-13 ASSESSMENT — PAIN DESCRIPTION - PAIN TYPE
TYPE: SURGICAL PAIN
TYPE: ACUTE PAIN
TYPE: ACUTE PAIN;SURGICAL PAIN
TYPE: ACUTE PAIN;SURGICAL PAIN
TYPE: ACUTE PAIN
TYPE: ACUTE PAIN

## 2021-07-13 NOTE — PROGRESS NOTES
1350  Received call from meds to beds. Pt's Xarelto won't be covered in [place] d/t medical insurance. Xarelto will cost ~$200. S/w Carmen SMITH. Carmen SMITH wants pt to have Xarelto filled and in hand at discharge d/t concern of poor compliance. Kim GALE notified of this and will discuss with patient. I also informed Kim GALE that pt will need a leg extender for w/c.     1333  Volte message sent to Carmen SMITH to add elevated leg rests/removables arm rest for w/c and slide board. PT/OT has concerns about pt discharging home without 24/7 care and requests that family and/or friend be trained on how to don/doff TLSO brace, how to use the slide board and transfers to w/c. PT/OT can train tomorrow. Still awaiting w/c, slide board and HHC to be arranged. Pt medically ready for dc.      1654   CM unable to obtain w/c w/o removable armrests d/t pt insurance. As of today, pt is unable to transfer from the w/c w/o removing armrests, 2 person assist and slideboard. If unable to arrange removable armrests, pt may be able to transfer with 1-2 person assist and gait belt. Possible dc to home tomorrow pending DME, HHC, family training and meds to beds.
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
## 2021-07-13 NOTE — DISCHARGE PLANNING
Anticipated Discharge Disposition: Home with HHC and DME-wheelchair    Action: LSW spoke with SARAH Madrid who requested this LSW find the pt a PCP and get him a wheelchair to DC home with. LSW met with pt at bedside. Pt reported that his previous doctor was Ander Way, however they no longer work at [facility]. Pt stated that he would like this LSW to call [facility] to find out if he still has a PCP. Pt agreeable to have wheelchair ordered from whoever will accept his insurance. Pt also reported that he has previously been on service with [facility]. LSW notified the pt that he may not be able to get HHC set up if he doesn't have a current PCP. If that is the case he will need to follow up with a PCP appointment to get HHC.    LSW made phone call to [facility] ([phone]) and spoke with Eliot. Eliot reported that this pt is still established with them, even though MD Way no longer works there. Eliot scheduled an appointment for this pt to establish with MD Flower 7/21 @8936 with a 1400 check in time. LSW entered appointment information in pt follow up.    Barriers to Discharge: wheelchair delivered    Plan: Care coordination will follow up with DME and HHC referral.

## 2021-07-13 NOTE — DISCHARGE PLANNING
LSW met with pt to verify DC plan and transportation home. Pt reported that he will have a ride when it comes time for him to DC.    [facility] reported that they received the referral for the WC, however did not receive the order for it and requested it be resent.    Addendum @2039  LSW notified by HUDSON Ag that the pt will need Zeralto and is unable to afford it. LSW made phone call to pharmacy tech [id number] and spoke with Karishma. Karishma provided this LSW with the cost for approved services for the medications. LSW faxed approved services to pharmacy.

## 2021-07-13 NOTE — PROGRESS NOTES
1730 Charge nurse passed patient room and patient's spouse was at bedside. Security called. Pt's spouse escorted out of room. 3 empty [facility] insulin needles found in room. No substance found in syringes. Pt alert and oriented. Pt mental status doesn't appear altered and at baseline. Pt not a legal hold. Therefore, security is unable to search patient belongings. Emily ortho supervisor, s/w patient at bedside. All visitors have been restricted d/t pt receiving heroin from visitor this admission. Carmen SMITH notified. MScontin dc'd and dilaudid dose/length changed.
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
## 2021-07-13 NOTE — DISCHARGE PLANNING
@1256  Agency/Facility Name: [facility]  Outcome: Referral resent  @1142  Agency[facility]Facility Name: [facility]  Outcome: Per Fax received referral decline due to non contracted with insurance.     Received Choice form at 1001  Agency[facility]Facility Name: [facility]  Referral sent per Choice form @ 1001    Received Choice form at 1001  Agency/Facility Name: [facility]   Referral sent per Choice form @ 1001

## 2021-07-13 NOTE — PROGRESS NOTES
Spoke with Carmen SMITH of the trauma surgery service.  Reviewed patient's chart in detail.  Discussed on executive difficult discharge rounds.  Patient is medically stable for transfer home. His insurance is not contracted with [facility] but he has good family support and 24/7 care at home. PT/OT will train family tomorrow AM and he can get outpatient therapy services in [place] with his already established PCP appointment.  No other acute medical changes at this time.

## 2021-07-14 ENCOUNTER — PHARMACY VISIT (OUTPATIENT)
Dept: PHARMACY | Facility: MEDICAL CENTER | Age: 56
End: 2021-07-14
Payer: COMMERCIAL

## 2021-07-14 VITALS
DIASTOLIC BLOOD PRESSURE: 74 MMHG | HEART RATE: 76 BPM | OXYGEN SATURATION: 98 % | RESPIRATION RATE: 18 BRPM | TEMPERATURE: 97.2 F | HEIGHT: 69 IN | WEIGHT: 175.49 LBS | BODY MASS INDEX: 25.99 KG/M2 | SYSTOLIC BLOOD PRESSURE: 93 MMHG

## 2021-07-14 PROCEDURE — 97530 THERAPEUTIC ACTIVITIES: CPT

## 2021-07-14 PROCEDURE — 99239 HOSP IP/OBS DSCHRG MGMT >30: CPT | Performed by: INTERNAL MEDICINE

## 2021-07-14 PROCEDURE — 700102 HCHG RX REV CODE 250 W/ 637 OVERRIDE(OP): Performed by: SURGERY

## 2021-07-14 PROCEDURE — A9270 NON-COVERED ITEM OR SERVICE: HCPCS | Performed by: NURSE PRACTITIONER

## 2021-07-14 PROCEDURE — 700102 HCHG RX REV CODE 250 W/ 637 OVERRIDE(OP): Performed by: NURSE PRACTITIONER

## 2021-07-14 PROCEDURE — 97535 SELF CARE MNGMENT TRAINING: CPT

## 2021-07-14 PROCEDURE — A9270 NON-COVERED ITEM OR SERVICE: HCPCS | Performed by: SURGERY

## 2021-07-14 PROCEDURE — RXMED WILLOW AMBULATORY MEDICATION CHARGE: Performed by: NURSE PRACTITIONER

## 2021-07-14 RX ADMIN — FUROSEMIDE 20 MG: 20 TABLET ORAL at 06:05

## 2021-07-14 RX ADMIN — FAMOTIDINE 20 MG: 20 TABLET ORAL at 06:05

## 2021-07-14 RX ADMIN — HYDROMORPHONE HYDROCHLORIDE 2 MG: 2 TABLET ORAL at 00:03

## 2021-07-14 RX ADMIN — HYDROMORPHONE HYDROCHLORIDE 2 MG: 2 TABLET ORAL at 06:05

## 2021-07-14 RX ADMIN — TAMSULOSIN HYDROCHLORIDE 0.4 MG: 0.4 CAPSULE ORAL at 06:05

## 2021-07-14 RX ADMIN — ACETAMINOPHEN 650 MG: 325 TABLET, FILM COATED ORAL at 03:47

## 2021-07-14 RX ADMIN — RIVAROXABAN 15 MG: 15 TABLET, FILM COATED ORAL at 08:23

## 2021-07-14 RX ADMIN — METAXALONE 400 MG: 800 TABLET ORAL at 03:47

## 2021-07-14 ASSESSMENT — COGNITIVE AND FUNCTIONAL STATUS - GENERAL
TURNING FROM BACK TO SIDE WHILE IN FLAT BAD: UNABLE
MOVING FROM LYING ON BACK TO SITTING ON SIDE OF FLAT BED: UNABLE
TOILETING: A LOT
DAILY ACTIVITIY SCORE: 14
PERSONAL GROOMING: A LITTLE
DRESSING REGULAR LOWER BODY CLOTHING: A LOT
SUGGESTED CMS G CODE MODIFIER DAILY ACTIVITY: CK
DRESSING REGULAR UPPER BODY CLOTHING: A LOT
CLIMB 3 TO 5 STEPS WITH RAILING: TOTAL
HELP NEEDED FOR BATHING: A LOT
SUGGESTED CMS G CODE MODIFIER MOBILITY: CM
EATING MEALS: A LITTLE
WALKING IN HOSPITAL ROOM: TOTAL
MOBILITY SCORE: 7
STANDING UP FROM CHAIR USING ARMS: A LOT
MOVING TO AND FROM BED TO CHAIR: UNABLE

## 2021-07-14 ASSESSMENT — PAIN DESCRIPTION - PAIN TYPE
TYPE: ACUTE PAIN;SURGICAL PAIN

## 2021-07-14 ASSESSMENT — GAIT ASSESSMENTS: GAIT LEVEL OF ASSIST: UNABLE TO PARTICIPATE

## 2021-07-14 NOTE — DISCHARGE PLANNING
"Anticipated Discharge Disposition: Home with DME-WC and slid board    Action: Pt requires a slide board as well as a wheelchair that has removable armrests and an elevating leg rest. LSW made phone call to [employer] and spoke with Leela. Leela reported that they don't have the 18\" wheelchair, however they do have 16 and 20 inch ones. However, [facility] is not able to accept self-pay nor an CORINA with the hospital.    DANISHW made phone call to [employer] and spoke with Ifeanyi. Ifeanyi reported that they have the wheelchair and accept self pay. Payment would need to be received at the time of delivery. Ifeanyi reported that the wheelchair costs $250 and the elevated leg rest is $50. He also stated that they are able to deliver the wheelchair today.    LSW made phone call to pt's spouse Lucia. Lucia reported that they are able to pay $300 for the wheelchair.    LSW faxed choice form for [employer] to [facility].    Addendum @8458  SAV Johnson notified this DPA that none of the DME companies carry slide boards and it has to be pre-ordered. LSW made phone call to [place] campus director Marilee regarding slide board barrier to DC.    Addendum @2999  LSW notified by Marilee that the pt will be able to take home the slide board that is already in his room.  LSW made phone call to [employer] [facility] to find out an ETA for when the wheelchair will be delivered. Ifeanyi reported that it will be delivered today, however does not know what time. Ifeanyi requested pt's wife's phone number to call for payment.    Barriers to Discharge: DME delivery.    Plan: No DC needs identified at this time.  "
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
## 2021-07-14 NOTE — CARE PLAN
The patient is Stable - Low risk of patient condition declining or worsening    Shift Goals  Clinical Goals: safety  Patient Goals: pain control  Family Goals: pain control by time of discharge     Progress made toward(s) clinical / shift goals:    Problem: Pain - Standard  Goal: Alleviation of pain or a reduction in pain to the patient’s comfort goal  Outcome: Progressing   The pt is still complaining of pain with his pain medications that are available.   Problem: Knowledge Deficit - Standard  Goal: Patient and family/care givers will demonstrate understanding of plan of care, disease process/condition, diagnostic tests and medications  Outcome: Progressing   The pt is educated on his plan of care and treatments.   Problem: Urinary Elimination  Goal: Establish and maintain regular urinary output  Outcome: Progressing  The pt is urinating at a normal rate.      Patient is not progressing towards the following goals:

## 2021-07-14 NOTE — DISCHARGE PLANNING
Received Choice form at 1127  Agency/Facility Name: [facility]   Referral sent per Choice form @ 1129

## 2021-07-14 NOTE — DISCHARGE PLANNING
Meds-to-Beds: Discharge prescription orders listed below delivered to patient's bedside. HUDSON Grant notified. Patient counseled.  Patient elected to have co-payment billed to patient account.     Reviewed signs and symptoms of bleeding and when to seek medical attention. Reviewed potential drug-drug interactions.     Jesus Gorman   Home Medication Instructions LUIS:[id number]    Printed on:07/14/21 9449   Medication Information                      metaxalone (SKELAXIN) 800 MG Tab  Take one half tablet by mouth 3 times a day as needed for Muscle Spasms for up to 5 days.             rivaroxaban (XARELTO) 15 MG Tab tablet  Take 1 tablet by mouth 2 times a day with meals for 6 days.             rivaroxaban (XARELTO) 20 MG Tab tablet  Take 1 tablet by mouth with dinner.                 Clotilde Killian, PharmD

## 2021-07-14 NOTE — PROGRESS NOTES
Per Dr. Weaver, okay for patient to have visitors in order for PT/OT to teach wife how to safely transfer patient from bed to wheelchair.    0900 This RN called patient's wife Lucia to see when she will be able to come by for PT/OT.  Lucia stated that she will be here around 1330 and will be bringing a friend to also learn how to safely transfer patient.

## 2021-07-14 NOTE — CARE PLAN
The patient is Stable - Low risk of patient condition declining or worsening    Shift Goals  Clinical Goals: safety  Patient Goals: pain control  Family Goals: no family present    Progress made toward(s) clinical / shift goals:    Problem: Pain - Standard  Goal: Alleviation of pain or a reduction in pain to the patient’s comfort goal  Outcome: Progressing  Note: PRN pain medication given per MAR     Problem: Knowledge Deficit - Standard  Goal: Patient and family/care givers will demonstrate understanding of plan of care, disease process/condition, diagnostic tests and medications  Outcome: Progressing  Note: Patient able to communicate needs effectively. Plan of care updated to patient and on whiteboard. All questions answered at this time.        Patient is not progressing towards the following goals:

## 2021-07-14 NOTE — THERAPY
"Occupational Therapy  Daily Treatment     Patient Name: Jesus Gorman  Age:  55 y.o., Sex:  male  Medical Record #: [id number]  Today's Date: 7/14/2021     Precautions  Precautions: Fall Risk, Non Weight Bearing Right Lower Extremity, Non Weight Bearing Right Upper Extremity, Immobilizer Right Lower Extremity, TLSO (Thoracolumbosacral orthosis), Spinal / Back Precautions   Comments: TLSO, L wrist brace, R knee immobilizer    Assessment    Pt seen for OT session. Pt progressing with txfs and activity tolerance. However continues to req max v/cs for WB restrictions, braces, and adhering to spinal precautions, sequencing for txfs, and assist for safety. Completed family training for brace wear/care, log roll assist (req max A), dressing/bathing/toileting, w/c and elevating leg rests, SB use for ADL txfs, WB/spinal precautions, DME (needs BSC), proper body mechanics while assisting, and home safetysetup. Wife reported she \"knows all this\" as she \"worked in a hospital\", but wife req max v/cs and re-education for each step of sequencing/setup for SB and txfs, w/c mgmt/use, and to assist pt with braces/ADLs. Wife unable, at any point, to demo skills/technique required for txfs and proper safety/assist that will be necessary for pt at home.  Continues to be limited by decreased functional mobility, activity tolerance, cognition, strength, AROM, coordination, balance, adherence to precautions, and pain which are currently affecting pt's ability to complete ADLs/IADLs at baseline. Will continue to follow. D/c home is not advised at this time.    Plan    Continue current treatment plan.    DC Equipment Recommendations: Bed Side Commode, Tub / Shower Seat (slide board)  Discharge Recommendations: Recommend post-acute placement for additional occupational therapy services prior to discharge home. If goes home recommend OTH and 24/7 assist for safety.     Objective     07/14/21 1332   Precautions   Precautions Fall " Risk;Immobilizer Left Lower Extremity;TLSO (Thoracolumbosacral orthosis);Spinal / Back Precautions ;Non Weight Bearing Right Lower Extremity;Platform Weight Bearing Right Upper Extremity   Comments TLSO, L wrist brace, R knee immobilizer   Vitals   O2 Delivery Device None - Room Air   Pain 0 - 10 Group   Therapist Pain Assessment Post Activity Pain Same as Prior to Activity;During Activity;Nurse Notified;0   Cognition    Cognition / Consciousness X   Level of Consciousness Alert   Safety Awareness Impaired;Impulsive   New Learning Impaired   Attention Impaired   Comments tangential req mod redirection for attention. Poor retention of education and decreased memory and adherence to WB precautions. Wife present and reported she already knew the info this therapist was providing, but unable to demonstrate appropriately   Passive ROM Upper Body   Passive ROM Upper Body X   Comments R wrist in splint   Active ROM Upper Body   Active ROM Upper Body  X   Dominant Hand Right   Comments LUE WFL, RUE limited by brace on wrist, full AROM of fingers.   Strength Upper Body   Upper Body Strength  X   Comments LUE WFL; RUE limited by brace req max v/cs for NWB. decreased  strength. encouraged to continue use for ADLs   Sensation Upper Body   Upper Extremity Sensation  WDL   Other Treatments   Other Treatments Provided Completed family training for brace wear/care, log roll assist, w/c and elevating leg rests, SB use for txfs, WB precautions, DME (needs BSC), body mechanics, proper assist, spinal precautions, and home safety.   Balance   Sitting Balance (Static) Fair   Sitting Balance (Dynamic) Fair -   Weight Shift Sitting Fair   Skilled Intervention Verbal Cuing;Tactile Cuing;Facilitation;Compensatory Strategies   Bed Mobility    Supine to Sit Maximal Assist  (for logroll)   Sit to Supine   (left in w/c)   Scooting Minimal Assist   Rolling Maximum Assist to Lt.   Skilled Intervention Verbal Cuing;Tactile  "Cuing;Facilitation;Compensatory Strategies;Sequencing   Comments req max v/cs and assist for log roll; pt with poor retention. wife educated   Activities of Daily Living   Grooming Supervision;Seated   Upper Body Dressing Moderate Assist  (TLSO)   Lower Body Dressing Moderate Assist  (max for immobilizer, mod for pants and sock)   Toileting Maximal Assist   Skilled Intervention Verbal Cuing;Tactile Cuing;Facilitation;Compensatory Strategies;Sequencing;Postural Facilitation   Comments max v/cs for adaptive techniques, braces, and adherence to WB   How much help from another person does the patient currently need...   Putting on and taking off regular lower body clothing? 2   Bathing (including washing, rinsing, and drying)? 2   Toileting, which includes using a toilet, bedpan, or urinal? 2   Putting on and taking off regular upper body clothing? 2   Taking care of personal grooming such as brushing teeth? 3   Eating meals? 3   6 Clicks Daily Activity Score 14   Functional Mobility   Sit to Stand Unable to Participate   Bed, Chair, Wheelchair Transfer Moderate Assist  (max v/cs for NWB RUE/LE and sequencing)   Toilet Transfers Refused   Transfer Method Slide Board   Mobility declined BSC, but bed>w/c   Skilled Intervention Verbal Cuing;Tactile Cuing;Facilitation;Compensatory Strategies;Sequencing   Comments req max v/cs for technique for pt and wife   Activity Tolerance   Comments improving in w/c   Patient / Family Goals   Patient / Family Goal #1 \"To get out of bed\"   Short Term Goals   Short Term Goal # 1 Pt will complete ADL transfers with min A   Goal Outcome # 1 Progressing slower than expected   Short Term Goal # 2 Pt will complete UB dressing with min A using jodi technique    Goal Outcome # 2 Progressing slower than expected   Short Term Goal # 3 Pt will complete seated in w/c grooming with supv    Goal Outcome # 3 Progressing as expected   Short Term Goal # 4 Pt will increase R composite flexion/grasp to " 100% to incorporate as stabilizer during bimanual functional tasks    Goal Outcome # 4 Progressing as expected   Education Group   Education Provided Transfers;Brace Wear and Care;Home Safety;Back Safety;Pathology of bedrest;Weight Bearing Precautions;Role of Occupational Therapist;Activities of Daily Living;Adaptive Equipment;Wheelchair Safety   Back Safety Patient Response Patient;Family;Acceptance;Explanation;Demonstration;Verbal Demonstration;Reinforcement Needed   Brace Wear & Care Patient Response Patient;Acceptance;Family;Explanation;Demonstration;Action Demonstration;Reinforcement Needed;Verbal Demonstration   Home Safety Patient Response Patient;Family;Acceptance;Explanation;Verbal Demonstration;Reinforcement Needed   Transfers Patient Response Patient;Family;Acceptance;Explanation;Demonstration;Action Demonstration;Reinforcement Needed;Verbal Demonstration   ADL Patient Response Patient;Family;Acceptance;Explanation;Demonstration;Verbal Demonstration;Reinforcement Needed   Adaptive Equipment Patient Response Patient;Family;Acceptance;Explanation;Verbal Demonstration;Reinforcement Needed   Wheelchair Safety Patient Response Patient;Family;Acceptance;Explanation;Demonstration;Action Demonstration;Reinforcement Needed   Weight Bearing Precautions Patient Response Patient;Family;Acceptance;Explanation;Verbal Demonstration;Reinforcement Needed   Pathology of Bedrest Patient Response Patient;Family;Acceptance;Explanation;Verbal Demonstration;Reinforcement Needed

## 2021-07-14 NOTE — PROGRESS NOTES
Pt given discharge information, educated about medications, and following up with upcoming appointments. Meds to bed delivered to patient. Pt left with all belongings, wheelchair, and slideboard via wheelchair with escort at 1520

## 2021-07-14 NOTE — THERAPY
Physical Therapy   Daily Treatment     Patient Name: Jesus Gorman  Age:  55 y.o., Sex:  male  Medical Record #: [id number]  Today's Date: 7/14/2021     Precautions: (P) Fall Risk, Non Weight Bearing Right Lower Extremity, Non Weight Bearing Right Upper Extremity, Immobilizer Right Lower Extremity, TLSO (Thoracolumbosacral orthosis), Spinal / Back Precautions     Assessment  Pt continues to slowly progress towards therapy goals with improved pain control and improved activity tolerance. Still requires heavy heavy cueing for NWB to RUE/RLE and spinal precautions and proper sequencing/setup/body mechanics for bed mobility and slide board transfer to wheelchair. Wife present for family training and observed therapy perform bed mobility and slide board transfer assist with heavy education on knee immobilizer, TLSO, slide board, wheelchair parts/brakes/armrests/legrests and verbalizes understanding and competence. Pt requires MAX assist for bed mobility (log roll with heavy cueing), slide board transfer MODA with heavy cueing. 2 trials total bed<>w/c slide board with wife present. After 1st trial completed with therapy (MAX bed mobility and MODA slide board), pt returned to bed and PT asked wife and pt to perform to demo competence (using pt's newly delivered w/c). Each step of process, wife required re-education and cueing to correctly perform. Wife and pt needed assistance with arm rest removal, wheelchair positioning, slide board positioning, transfer assistance on slide board, bed<>w/c transfer, NWB cueing, attaching leg rest, adjusting leg rest. Then wife had further questioning about car transfers to which therapy educated on safest possible technique using w/c and slide board with wife to assist. PT unable to provide car transfer training on actual car and therefore pt and wife unable to safely demonstrate. At no point was pt or wife able to demonstrate competence to be at a safe functional level to return home.  Patient is not safe to return home at this level. He is a high likelihood for a fall, reinjury and readmission. PT continues to recommend skilled PT at this facility or a post acute placement. Home discharge is not advised. PT will progress POC as tolerated.     Plan    Continue current treatment plan.    DC Equipment Recommendations: Unable to determine at this time  Discharge Recommendations: Recommend post-acute placement for additional physical therapy services prior to discharge home      Subjective  Pt agreeable to PT session and wife agreeable to family training. Pt with reduced pain overall.      Objective  Pt requires MAX assist for bed mobility (log roll with heavy cueing), slide board transfer MODA with heavy cueing. 2 trials total bed<>w/c slide board with wife present.      07/14/21 1432   Total Time Spent   Total Time Spent (Mins) 60   Charge Group   PT Therapeutic Activities 2   PT Self Care / Home Evaluation  2   Precautions   Precautions Fall Risk;Non Weight Bearing Right Lower Extremity;Non Weight Bearing Right Upper Extremity;Immobilizer Right Lower Extremity;TLSO (Thoracolumbosacral orthosis);Spinal / Back Precautions    Comments TLSO EOB, immobilizer R knee   Pain 0 - 10 Group   Therapist Pain Assessment Post Activity;During Activity  (moderate pain with mobility, not quantified)   Non Verbal Descriptors   Non Verbal Scale  Calm   Cognition    Level of Consciousness Alert   Comments Tangential, distracted, requires cueing and orientation to task at hand. Wife present throughout   Neuro-Muscular Treatments   Neuro-Muscular Treatments Anterior weight shift;Postural Facilitation;Verbal Cuing   Other Treatments   Other Treatments Provided Family training for 60 mins for transfers and equipment use   Balance   Sitting Balance (Static) Fair   Sitting Balance (Dynamic) Fair -   Weight Shift Sitting Fair   Skilled Intervention Verbal Cuing;Postural Facilitation;Sequencing   Comments Slide board transfer    Gait Analysis   Gait Level Of Assist Unable to Participate   Bed Mobility    Supine to Sit Maximal Assist   Sit to Supine   (up to w/c)   Scooting Moderate Assist   Rolling Moderate Assist to Lt.   Skilled Intervention Verbal Cuing;Tactile Cuing;Sequencing;Postural Facilitation;Compensatory Strategies   Comments Max cueing for log roll and bed mobility   Functional Mobility   Sit to Stand Unable to Participate   Bed, Chair, Wheelchair Transfer Moderate Assist  (heavy cueing for NWB RUE/RLE and sequencing)   Transfer Method Slide Board   Mobility slide board bed<>w/c 2 trials total   Skilled Intervention Verbal Cuing;Tactile Cuing;Sequencing;Postural Facilitation;Compensatory Strategies   Comments Heavy cueing throughout   How much difficulty does the patient currently have...   Turning over in bed (including adjusting bedclothes, sheets and blankets)? 1   Sitting down on and standing up from a chair with arms (e.g., wheelchair, bedside commode, etc.) 1   Moving from lying on back to sitting on the side of the bed? 1   How much help from another person does the patient currently need...   Moving to and from a bed to a chair (including a wheelchair)? 2   Need to walk in a hospital room? 1   Climbing 3-5 steps with a railing? 1   6 clicks Mobility Score 7   Activity Tolerance   Sitting in Chair up to w/c   Sitting Edge of Bed >20   Standing NT   Comments Improved tolerance and mobility but still breaks WB restrictions   Patient / Family Goals    Patient / Family Goal #1 to improve activity tolerance    Goal #1 Outcome Progressing as expected   Short Term Goals    Short Term Goal # 1 Pt will perform supine<>sit from flat HOB/no railing with supervision within 6 visits to ensure independent mobility at home.   Goal Outcome # 1 goal not met   Short Term Goal # 2 Pt will perform sit<>stand with hemiwalker vs crutch with supervision within 6 visits to ensure progression to independence.    Goal Outcome # 2 Goal not met    Short Term Goal # 3 Pt will perform seated slideboard transfer with min A within 6 visits to ensure progression to independence within 6 vistis.    Goal Outcome # 3 Goal not met   Short Term Goal # 4 Pt will self propel w/c x 150ft with left Ue/LE with supervision within 6 visits to ensure progression to independence.    Goal Outcome # 4 Goal met   Short Term Goal # 5 Pt will ascend/descend 3 stairs with left UE support and min A within 6 visits to enter/exit home.    Goal Outcome # 5 Goal not met   Education Group   Education Provided Role of Physical Therapist   Spine Precautions Patient Response Patient;Family;Acceptance;Explanation;Reinforcement Needed;Verbal Demonstration   Role of Physical Therapist Patient Response Patient;Family;Acceptance;Explanation;Verbal Demonstration   Brace Wear & Care Patient Response Patient;Family;Acceptance;Explanation;Verbal Demonstration;Reinforcement Needed   Anticipated Discharge Equipment and Recommendations   DC Equipment Recommendations Unable to determine at this time   Discharge Recommendations Recommend post-acute placement for additional physical therapy services prior to discharge home   Interdisciplinary Plan of Care Collaboration   IDT Collaboration with  Nursing;Family / Caregiver   Patient Position at End of Therapy Seated;Call Light within Reach;Tray Table within Reach;Phone within Reach;Family / Friend in Room   Collaboration Comments RN Updated   Session Information   Date / Session Number  7/14-9(3/3, 7/14)

## 2021-07-14 NOTE — DISCHARGE INSTRUCTIONS
- Call or seek medical attention for questions or concerns  - Follow up with [facility] on 7/21/2021 at 2 pm to establish with Dr. Flower as your primary care provider  - Referrals have been sent to outpatient physical therapy, occupational therapy, and anticoagulation services who you will need to follow up with locally, they may require a referral directly from your primary care provider  - Follow up with Dr. Marie as needed  - Follow up with Dr. Baca in 2 weeks time, continue to wear off the shelf TLSO when out of bed  - Follow up with Dr. Sherman in 2 weeks time, continue platform weightbearing to the right upper extremity with a velcro wrist splint in place, continue nonweightbearing to the right lower extremity with a knee immobilizer in place (may remove when in bed)  - Recommend outpatient follow up with an addiction center or methadone clinic for drug abuse  - Resume regular diet  - May take over the counter acetaminophen as needed for pain  - Continue daily over the counter stool softener as needed for constipation  - No operation of machinery or motorized vehicles  - No alcohol, marijuana or illicit drug use  - In the event of a narcotic overdose naloxone (Narcan) is available without a prescription from any [facility] or [facility]  - No swimming, hot tubs, baths or wound submersion until cleared by outpatient provider. May shower  - No contact sports, strenuous activities, or heavy lifting until cleared by outpatient provider  - If respiratory decompensation, persistent or worsening pain, changes in sensation or motor function, or signs or symptoms of infection occur seek medical attention    Discharge Instructions    Discharged to home by car with relative. Discharged via wheelchair, hospital escort: Yes.  Special equipment needed: Wheelchair    Be sure to schedule a follow-up appointment with your primary care doctor or any specialists as instructed.     Discharge Plan:   Diet Plan:  Discussed  Activity Level: Discussed  Confirmed Follow up Appointment: Patient to Call and Schedule Appointment  Confirmed Symptoms Management: Discussed  Medication Reconciliation Updated: Yes    I understand that a diet low in cholesterol, fat, and sodium is recommended for good health. Unless I have been given specific instructions below for another diet, I accept this instruction as my diet prescription.   Other diet: Regular    Special Instructions: None    · Is patient discharged on Warfarin / Coumadin?   No     Depression / Suicide Risk    As you are discharged from this [facility] facility, it is important to learn how to keep safe from harming yourself.    Recognize the warning signs:  · Abrupt changes in personality, positive or negative- including increase in energy   · Giving away possessions  · Change in eating patterns- significant weight changes-  positive or negative  · Change in sleeping patterns- unable to sleep or sleeping all the time   · Unwillingness or inability to communicate  · Depression  · Unusual sadness, discouragement and loneliness  · Talk of wanting to die  · Neglect of personal appearance   · Rebelliousness- reckless behavior  · Withdrawal from people/activities they love  · Confusion- inability to concentrate     If you or a loved one observes any of these behaviors or has concerns about self-harm, here's what you can do:  · Talk about it- your feelings and reasons for harming yourself  · Remove any means that you might use to hurt yourself (examples: pills, rope, extension cords, firearm)  · Get professional help from the community (Mental Health, Substance Abuse, psychological counseling)  · Do not be alone:Call your Safe Contact- someone whom you trust who will be there for you.  · Call your local CRISIS HOTLINE 328-7596 or 721-165-0140  · Call your local Children's Mobile Crisis Response Team [place] (010) 585-7306 or www.[employer].io  · Call the toll free [employer]  Suicide Prevention Hotlines   · National Suicide Prevention Lifeline 420-883-LIQA (4264)  · [facility] 800-SUICIDE (452-4560)    Open Reduction, Internal Fixation (ORIF), Generic  Usually, if bones are broken (fractured) and are out of place, unstable, or may become out of place, surgery is needed. This surgery is called an open reduction and internal fixation (ORIF). Open reduction means that the area of the fracture is opened up so the surgeon can see it. Internal fixation means that screws, pins, or fixation devices are used to hold the bone pieces in place.  LET YOUR CAREGIVER KNOW ABOUT:   · Allergies.  · Medicines taken, including herbs, eyedrops, over-the-counter medicines, and creams.  · Use of steroids (by mouth or creams).  · Previous problems with anesthetics or numbing medicines.  · History of bleeding or blood problems.  · History of blood clots.  · Possibility of pregnancy, if this applies.  · Previous surgery.  · Other health problems.  RISKS AND COMPLICATIONS   All surgery is associated with risks. Some of these risks are:  · Excessive bleeding.  · Infection.  · Imperfect results with loss of joint function.  BEFORE THE PROCEDURE   Usually, surgery is performed shortly after the injury. It is important to provide information to your caregiver after your injury.  AFTER THE PROCEDURE   After surgery, you will be taken to a recovery area where a nurse will monitor your progress. You may have a long, narrow tube(catheter) in the bladder following surgery that helps you pass your water. When awake, stable, taking fluids well, and without complications, you will be returned to your room. You will receive physical therapy and other care. Physical therapy is done until you are doing well and your caregiver feels it is safe for you to go home or to an extended care facility.  Following surgery, the bones may be protected with a cast. The type of casting depends on where the fracture was. Casts  are generally left in place for about 5 to 6 weeks. During this time, your caregiver will follow your progress. X-rays may be taken during healing to make sure the bones stay in place.  HOME CARE INSTRUCTIONS   · You or your child may resume normal diet and activities as directed or allowed.  · Put ice on the injured area.  · Put ice in a plastic bag.  · Place a towel between the skin and the bag.  · Leave the ice on for 15-20 minutes at a time, 3-4 times a day, for the first 2 days following surgery.  · Change bandages (dressings) if necessary or as directed.  · If given a plaster or fiberglass cast:  · Do not try to scratch the skin under the cast using sharp or pointed objects.  · Check the skin around the cast every day. You may put lotion on any red or sore areas.  · Keep the cast dry and clean.  · Do not put pressure on any part of the cast or splint until it is fully hardened.  · The cast or splint can be protected during bathing with a plastic bag. Do not lower the cast or splint into water.  · Only take over-the-counter or prescription medicines for pain, discomfort, or fever as directed by your caregiver.  · Use crutches as directed and do not exercise the leg unless instructed.  · If the bones get out of position (displaced), it may eventually lead to arthritis and lasting disability. Problems can follow even the best of care. Follow the directions of your caregiver.  · Follow all instructions given by your caregiver, make and keep follow-up appointments, and use crutches as directed.  SEEK IMMEDIATE MEDICAL CARE IF:   · There is redness, swelling, numbness, or increasing pain in the wound.  · There is pus coming from the wound.  · You or your child has an oral temperature above 102° F (38.9° C), not controlled by medicine.  · A bad smell is coming from the wound or dressing.  · The wound breaks open (edges not staying together) after stitches (sutures) or staples have been removed.  · The skin or nails  below the injury turn blue or gray, or feel cold or numb.  · There is severe pain under the cast or in the foot.  If there is not a window in the cast for observing the wound, a discharge or minor bleeding may show up as a stain on the outside of the cast. Report these findings to your caregiver.  MAKE SURE YOU:   · Understand these instructions.  · Will watch your condition.  · Will get help right away if you are not doing well or gets worse.  Document Released: 12/29/2007 Document Revised: 03/11/2013 Document Reviewed: 12/05/2008  [employer] Patient Information ©2014 [employer], [place].

## 2021-07-14 NOTE — DISCHARGE PLANNING
[facility] Rehabilitation Transitional Care Coordination    Referral from:  Man Weaver MD   Insurance Provider on Facesheet: [employer]    Discussed with PMR - Unfortunately, [facility] rehab is not contracted with [employer], and unable to accept pt.    Also, [facility] which services [place] does not accept [employer].  Post acute recommendations are for a SNF in [place].     This referral will not be forwarded to PMR for consult.     I have messaged Kanwal CARPENTER/ SHAHLA also.     No further intervention.

## 2021-07-15 NOTE — CARE PLAN
The patient is Stable - Low risk of patient condition declining or worsening    Shift Goals  Clinical Goals: safety  Patient Goals: pain control  Family Goals: no family present    Progress made toward(s) clinical / shift goals:    Problem: Pain - Standard  Goal: Alleviation of pain or a reduction in pain to the patient’s comfort goal  7/14/2021 1754 by Juanita Pham R.N.  Outcome: Met  7/14/2021 1256 by Juanita Pham R.N.  Outcome: Progressing  Note: PRN pain medication given per MAR     Problem: Knowledge Deficit - Standard  Goal: Patient and family/care givers will demonstrate understanding of plan of care, disease process/condition, diagnostic tests and medications  7/14/2021 1754 by Juanita Pham R.N.  Outcome: Met  7/14/2021 1256 by Juanita Pham R.N.  Outcome: Progressing  Note: Patient able to communicate needs effectively. Plan of care updated to patient and on whiteboard. All questions answered at this time.      Problem: Skin Integrity  Goal: Skin integrity is maintained or improved  Outcome: Met     Problem: Psychosocial  Goal: Patient's level of anxiety will decrease  Outcome: Met  Goal: Patient's ability to verbalize feelings about condition will improve  Outcome: Met  Goal: Patient's ability to re-evaluate and adapt role responsibilities will improve  Outcome: Met  Goal: Patient and family will demonstrate ability to cope with life altering diagnosis and/or procedure  Outcome: Met  Goal: Spiritual and cultural needs incorporated into hospitalization  Outcome: Met     Problem: Discharge Barriers/Planning  Goal: Patient's continuum of care needs are met  Outcome: Met     Problem: Hemodynamics  Goal: Patient's hemodynamics, fluid balance and neurologic status will be stable or improve  Outcome: Met     Problem: Respiratory  Goal: Patient will achieve/maintain optimum respiratory ventilation and gas exchange  Outcome: Met     Problem: Fluid Volume  Goal: Fluid volume balance will be  maintained  Outcome: Met     Problem: Dysphagia  Goal: Dysphagia will improve  Outcome: Met     Problem: Risk for Aspiration  Goal: Patient's risk for aspiration will be absent or decrease  Outcome: Met     Problem: Nutrition  Goal: Patient's nutritional and fluid intake will be adequate or improve  Outcome: Met  Goal: Enteral nutrition will be maintained or improve  Outcome: Met  Goal: Enteral nutrition will be maintained or improve  Outcome: Met     Problem: Urinary Elimination  Goal: Establish and maintain regular urinary output  Outcome: Met     Problem: Bowel Elimination  Goal: Establish and maintain regular bowel function  Outcome: Met     Problem: Mobility  Goal: Patient's capacity to carry out activities will improve  Outcome: Met     Problem: Self Care  Goal: Patient will have the ability to perform ADLs independently or with assistance (bathe, groom, dress, toilet and feed)  Outcome: Met     Problem: Infection - Standard  Goal: Patient will remain free from infection  Outcome: Met     Problem: Wound/ / Incision Healing  Goal: Patient's wound/surgical incision will decrease in size and heals properly  Outcome: Met       Patient is not progressing towards the following goals:

## 2021-07-16 ENCOUNTER — DOCUMENTATION (OUTPATIENT)
Dept: VASCULAR LAB | Facility: MEDICAL CENTER | Age: 56
End: 2021-07-16

## 2021-07-16 NOTE — PROGRESS NOTES
[facility] Heart and Vascular Health and Pharmacotherapy Programs      Called and spoke to the patient in an effort to establish care regarding anticoagulation referral for Xarelto due to Hx of DVT from Dr. Madrid on 7/13/21.      Patient reports that he lives in [place], [place] and has Medical and will be following up anticoagulation monitoring with his PCP there locally.   He requests his referral be closed as he will not be returning to be seen in our clinic.      Insurance: Medical  PCP: non-[facility]   Locations to be seen: MAIN      Phone number left for return call or any questions or concerns.  [facility] at [phone], fax [phone]      Ros PeñaD

## 2021-09-18 NOTE — PROGRESS NOTES
2 RN Skin Check    2 RN skin check completed with HUDSON Stevens.   Devices in place: SCD, Hightower, Nasal Cannula, Dressing to RUE and RLE, RLE immoblizer.  Skin assessed under devices: Yes.  Confirmed pressure ulcers found on: N/A.  New potential pressure ulcers noted on N/A.   Wound consult placed N/A.  The following interventions in place Q2H turns. Pillows for support and postioning. Linen changes as needed.    Skin: Skin is fragile throughout with scattered abrasions. RUE has CDI  dressing in place. LUE abrasions. Right hip has CDI gauze and Tegaderm. RLE dressing CDI with immobilizer in place. Left ankle has small open skin with Mepilex in place. Left heel is intact and blanching. Small open scratches to lower back. Sacrum is intact, pink, and blanching. Scab to top of head.    Principal Discharge DX:	Skin irritation   1
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
## 2024-02-06 ENCOUNTER — HOSPITAL ENCOUNTER (INPATIENT)
Facility: MEDICAL CENTER | Age: 59
LOS: 6 days | DRG: 300 | End: 2024-02-12
Attending: EMERGENCY MEDICINE | Admitting: STUDENT IN AN ORGANIZED HEALTH CARE EDUCATION/TRAINING PROGRAM
Payer: COMMERCIAL

## 2024-02-06 ENCOUNTER — APPOINTMENT (OUTPATIENT)
Dept: RADIOLOGY | Facility: MEDICAL CENTER | Age: 59
DRG: 300 | End: 2024-02-06
Attending: STUDENT IN AN ORGANIZED HEALTH CARE EDUCATION/TRAINING PROGRAM
Payer: COMMERCIAL

## 2024-02-06 ENCOUNTER — APPOINTMENT (OUTPATIENT)
Dept: RADIOLOGY | Facility: MEDICAL CENTER | Age: 59
DRG: 300 | End: 2024-02-06
Attending: EMERGENCY MEDICINE
Payer: COMMERCIAL

## 2024-02-06 DIAGNOSIS — I82.492 ACUTE DEEP VEIN THROMBOSIS (DVT) OF OTHER SPECIFIED VEIN OF LEFT LOWER EXTREMITY (HCC): ICD-10-CM

## 2024-02-06 PROBLEM — L03.119 CELLULITIS OF LOWER EXTREMITY: Status: ACTIVE | Noted: 2024-02-06

## 2024-02-06 PROBLEM — I82.402 ACUTE DEEP VEIN THROMBOSIS (DVT) OF LEFT LOWER EXTREMITY, UNSPECIFIED VEIN (HCC): Status: ACTIVE | Noted: 2024-02-06

## 2024-02-06 LAB
ANION GAP SERPL CALC-SCNC: 12 MMOL/L (ref 7–16)
APPEARANCE UR: CLEAR
APTT PPP: 40.8 SEC (ref 24.7–36)
BACTERIA #/AREA URNS HPF: ABNORMAL /HPF
BASOPHILS # BLD AUTO: 0.5 % (ref 0–1.8)
BASOPHILS # BLD: 0.06 K/UL (ref 0–0.12)
BILIRUB UR QL STRIP.AUTO: NEGATIVE
BUN SERPL-MCNC: 61 MG/DL (ref 8–22)
CALCIUM SERPL-MCNC: 8.1 MG/DL (ref 8.5–10.5)
CHLORIDE SERPL-SCNC: 105 MMOL/L (ref 96–112)
CHLORIDE UR-SCNC: <20 MMOL/L
CO2 SERPL-SCNC: 20 MMOL/L (ref 20–33)
COLOR UR: YELLOW
CREAT SERPL-MCNC: 3.85 MG/DL (ref 0.5–1.4)
CREAT UR-MCNC: 54.31 MG/DL
CRP SERPL HS-MCNC: 7.46 MG/DL (ref 0–0.75)
EOSINOPHIL # BLD AUTO: 0.28 K/UL (ref 0–0.51)
EOSINOPHIL NFR BLD: 2.5 % (ref 0–6.9)
EPI CELLS #/AREA URNS HPF: NEGATIVE /HPF
ERYTHROCYTE [DISTWIDTH] IN BLOOD BY AUTOMATED COUNT: 44.4 FL (ref 35.9–50)
ERYTHROCYTE [SEDIMENTATION RATE] IN BLOOD BY WESTERGREN METHOD: >140 MM/HOUR (ref 0–20)
GFR SERPLBLD CREATININE-BSD FMLA CKD-EPI: 17 ML/MIN/1.73 M 2
GLUCOSE SERPL-MCNC: 106 MG/DL (ref 65–99)
GLUCOSE UR STRIP.AUTO-MCNC: NEGATIVE MG/DL
HCT VFR BLD AUTO: 28.1 % (ref 42–52)
HGB BLD-MCNC: 9.2 G/DL (ref 14–18)
HYALINE CASTS #/AREA URNS LPF: ABNORMAL /LPF
IMM GRANULOCYTES # BLD AUTO: 0.12 K/UL (ref 0–0.11)
IMM GRANULOCYTES NFR BLD AUTO: 1.1 % (ref 0–0.9)
INR PPP: 1.64 (ref 0.87–1.13)
KETONES UR STRIP.AUTO-MCNC: NEGATIVE MG/DL
LEUKOCYTE ESTERASE UR QL STRIP.AUTO: ABNORMAL
LYMPHOCYTES # BLD AUTO: 1.05 K/UL (ref 1–4.8)
LYMPHOCYTES NFR BLD: 9.2 % (ref 22–41)
MAGNESIUM SERPL-MCNC: 1.7 MG/DL (ref 1.5–2.5)
MCH RBC QN AUTO: 31 PG (ref 27–33)
MCHC RBC AUTO-ENTMCNC: 32.7 G/DL (ref 32.3–36.5)
MCV RBC AUTO: 94.6 FL (ref 81.4–97.8)
MICRO URNS: ABNORMAL
MONOCYTES # BLD AUTO: 0.73 K/UL (ref 0–0.85)
MONOCYTES NFR BLD AUTO: 6.4 % (ref 0–13.4)
NEUTROPHILS # BLD AUTO: 9.13 K/UL (ref 1.82–7.42)
NEUTROPHILS NFR BLD: 80.3 % (ref 44–72)
NITRITE UR QL STRIP.AUTO: NEGATIVE
NRBC # BLD AUTO: 0 K/UL
NRBC BLD-RTO: 0 /100 WBC (ref 0–0.2)
PH UR STRIP.AUTO: 5.5 [PH] (ref 5–8)
PHOSPHATE SERPL-MCNC: 4.7 MG/DL (ref 2.5–4.5)
PLATELET # BLD AUTO: 290 K/UL (ref 164–446)
PMV BLD AUTO: 9.4 FL (ref 9–12.9)
POTASSIUM SERPL-SCNC: 4.5 MMOL/L (ref 3.6–5.5)
POTASSIUM UR-SCNC: 30 MMOL/L
PROCALCITONIN SERPL-MCNC: 1.63 NG/ML
PROT UR QL STRIP: NEGATIVE MG/DL
PROT UR-MCNC: 9 MG/DL (ref 0–15)
PROT/CREAT UR: 166 MG/G (ref 15–68)
PROTHROMBIN TIME: 19.7 SEC (ref 12–14.6)
RBC # BLD AUTO: 2.97 M/UL (ref 4.7–6.1)
RBC # URNS HPF: ABNORMAL /HPF
RBC UR QL AUTO: NEGATIVE
SODIUM SERPL-SCNC: 137 MMOL/L (ref 135–145)
SODIUM UR-SCNC: <20 MMOL/L
SP GR UR STRIP.AUTO: 1.01
UFH PPP CHRO-ACNC: >1.1 IU/ML
UROBILINOGEN UR STRIP.AUTO-MCNC: 0.2 MG/DL
WBC # BLD AUTO: 11.4 K/UL (ref 4.8–10.8)
WBC #/AREA URNS HPF: ABNORMAL /HPF
YEAST BUDDING URNS QL: PRESENT /HPF
YEAST HYPHAE #/AREA URNS HPF: PRESENT /HPF

## 2024-02-06 PROCEDURE — 84145 PROCALCITONIN (PCT): CPT

## 2024-02-06 PROCEDURE — 700102 HCHG RX REV CODE 250 W/ 637 OVERRIDE(OP): Performed by: STUDENT IN AN ORGANIZED HEALTH CARE EDUCATION/TRAINING PROGRAM

## 2024-02-06 PROCEDURE — 85520 HEPARIN ASSAY: CPT

## 2024-02-06 PROCEDURE — 85025 COMPLETE CBC W/AUTO DIFF WBC: CPT

## 2024-02-06 PROCEDURE — 99285 EMERGENCY DEPT VISIT HI MDM: CPT

## 2024-02-06 PROCEDURE — 87640 STAPH A DNA AMP PROBE: CPT

## 2024-02-06 PROCEDURE — 82570 ASSAY OF URINE CREATININE: CPT

## 2024-02-06 PROCEDURE — 82436 ASSAY OF URINE CHLORIDE: CPT

## 2024-02-06 PROCEDURE — 700111 HCHG RX REV CODE 636 W/ 250 OVERRIDE (IP): Mod: JZ | Performed by: STUDENT IN AN ORGANIZED HEALTH CARE EDUCATION/TRAINING PROGRAM

## 2024-02-06 PROCEDURE — 85730 THROMBOPLASTIN TIME PARTIAL: CPT

## 2024-02-06 PROCEDURE — 80048 BASIC METABOLIC PNL TOTAL CA: CPT

## 2024-02-06 PROCEDURE — 87641 MR-STAPH DNA AMP PROBE: CPT

## 2024-02-06 PROCEDURE — 36415 COLL VENOUS BLD VENIPUNCTURE: CPT

## 2024-02-06 PROCEDURE — 84133 ASSAY OF URINE POTASSIUM: CPT

## 2024-02-06 PROCEDURE — 83735 ASSAY OF MAGNESIUM: CPT

## 2024-02-06 PROCEDURE — 84300 ASSAY OF URINE SODIUM: CPT

## 2024-02-06 PROCEDURE — 700105 HCHG RX REV CODE 258: Performed by: STUDENT IN AN ORGANIZED HEALTH CARE EDUCATION/TRAINING PROGRAM

## 2024-02-06 PROCEDURE — 96365 THER/PROPH/DIAG IV INF INIT: CPT

## 2024-02-06 PROCEDURE — 76775 US EXAM ABDO BACK WALL LIM: CPT

## 2024-02-06 PROCEDURE — A9270 NON-COVERED ITEM OR SERVICE: HCPCS | Performed by: STUDENT IN AN ORGANIZED HEALTH CARE EDUCATION/TRAINING PROGRAM

## 2024-02-06 PROCEDURE — 85652 RBC SED RATE AUTOMATED: CPT

## 2024-02-06 PROCEDURE — 99223 1ST HOSP IP/OBS HIGH 75: CPT | Performed by: STUDENT IN AN ORGANIZED HEALTH CARE EDUCATION/TRAINING PROGRAM

## 2024-02-06 PROCEDURE — 85610 PROTHROMBIN TIME: CPT

## 2024-02-06 PROCEDURE — 770006 HCHG ROOM/CARE - MED/SURG/GYN SEMI*

## 2024-02-06 PROCEDURE — 84156 ASSAY OF PROTEIN URINE: CPT

## 2024-02-06 PROCEDURE — 84100 ASSAY OF PHOSPHORUS: CPT

## 2024-02-06 PROCEDURE — 86140 C-REACTIVE PROTEIN: CPT

## 2024-02-06 PROCEDURE — 93971 EXTREMITY STUDY: CPT | Mod: LT

## 2024-02-06 PROCEDURE — 93971 EXTREMITY STUDY: CPT | Mod: 26,LT | Performed by: INTERNAL MEDICINE

## 2024-02-06 PROCEDURE — 81001 URINALYSIS AUTO W/SCOPE: CPT

## 2024-02-06 RX ORDER — HEPARIN SODIUM 1000 [USP'U]/ML
40 INJECTION, SOLUTION INTRAVENOUS; SUBCUTANEOUS PRN
Status: DISCONTINUED | OUTPATIENT
Start: 2024-02-06 | End: 2024-02-06

## 2024-02-06 RX ORDER — HEPARIN SODIUM 1000 [USP'U]/ML
80 INJECTION, SOLUTION INTRAVENOUS; SUBCUTANEOUS ONCE
Status: DISCONTINUED | OUTPATIENT
Start: 2024-02-06 | End: 2024-02-06

## 2024-02-06 RX ORDER — HYDROMORPHONE HYDROCHLORIDE 1 MG/ML
0.25 INJECTION, SOLUTION INTRAMUSCULAR; INTRAVENOUS; SUBCUTANEOUS EVERY 8 HOURS PRN
Status: DISCONTINUED | OUTPATIENT
Start: 2024-02-06 | End: 2024-02-12 | Stop reason: HOSPADM

## 2024-02-06 RX ORDER — ACETAMINOPHEN 500 MG
1000 TABLET ORAL EVERY 6 HOURS PRN
Status: DISCONTINUED | OUTPATIENT
Start: 2024-02-11 | End: 2024-02-12 | Stop reason: HOSPADM

## 2024-02-06 RX ORDER — OXYCODONE HYDROCHLORIDE 5 MG/1
5 TABLET ORAL EVERY 6 HOURS PRN
Status: DISCONTINUED | OUTPATIENT
Start: 2024-02-06 | End: 2024-02-12 | Stop reason: HOSPADM

## 2024-02-06 RX ORDER — OXYCODONE HYDROCHLORIDE 5 MG/1
2.5 TABLET ORAL EVERY 6 HOURS PRN
Status: DISCONTINUED | OUTPATIENT
Start: 2024-02-06 | End: 2024-02-12 | Stop reason: HOSPADM

## 2024-02-06 RX ORDER — CLOPIDOGREL BISULFATE 75 MG/1
75 TABLET ORAL DAILY
Status: DISCONTINUED | OUTPATIENT
Start: 2024-02-06 | End: 2024-02-12 | Stop reason: HOSPADM

## 2024-02-06 RX ORDER — HEPARIN SODIUM 5000 [USP'U]/100ML
INJECTION, SOLUTION INTRAVENOUS CONTINUOUS
Status: DISCONTINUED | OUTPATIENT
Start: 2024-02-07 | End: 2024-02-08

## 2024-02-06 RX ORDER — HEPARIN SODIUM 5000 [USP'U]/100ML
0-30 INJECTION, SOLUTION INTRAVENOUS CONTINUOUS
Status: DISCONTINUED | OUTPATIENT
Start: 2024-02-06 | End: 2024-02-06

## 2024-02-06 RX ORDER — ACETAMINOPHEN 500 MG
1000 TABLET ORAL EVERY 6 HOURS
Status: DISPENSED | OUTPATIENT
Start: 2024-02-06 | End: 2024-02-11

## 2024-02-06 RX ORDER — SODIUM CHLORIDE 9 MG/ML
INJECTION, SOLUTION INTRAVENOUS CONTINUOUS
Status: DISCONTINUED | OUTPATIENT
Start: 2024-02-06 | End: 2024-02-06

## 2024-02-06 RX ORDER — SODIUM CHLORIDE 9 MG/ML
INJECTION, SOLUTION INTRAVENOUS CONTINUOUS
Status: DISCONTINUED | OUTPATIENT
Start: 2024-02-06 | End: 2024-02-09

## 2024-02-06 RX ORDER — HEPARIN SODIUM 1000 [USP'U]/ML
2600 INJECTION, SOLUTION INTRAVENOUS; SUBCUTANEOUS PRN
Status: DISCONTINUED | OUTPATIENT
Start: 2024-02-07 | End: 2024-02-08

## 2024-02-06 RX ORDER — LABETALOL HYDROCHLORIDE 5 MG/ML
10 INJECTION, SOLUTION INTRAVENOUS EVERY 4 HOURS PRN
Status: DISCONTINUED | OUTPATIENT
Start: 2024-02-06 | End: 2024-02-12 | Stop reason: HOSPADM

## 2024-02-06 RX ADMIN — CLOPIDOGREL BISULFATE 75 MG: 75 TABLET ORAL at 15:52

## 2024-02-06 RX ADMIN — OXYCODONE 5 MG: 5 TABLET ORAL at 17:50

## 2024-02-06 RX ADMIN — ACETAMINOPHEN 1000 MG: 500 TABLET ORAL at 23:36

## 2024-02-06 RX ADMIN — AMPICILLIN AND SULBACTAM 3 G: 1; 2 INJECTION, POWDER, FOR SOLUTION INTRAMUSCULAR; INTRAVENOUS at 14:49

## 2024-02-06 RX ADMIN — ACETAMINOPHEN 1000 MG: 500 TABLET ORAL at 17:51

## 2024-02-06 RX ADMIN — SODIUM CHLORIDE: 9 INJECTION, SOLUTION INTRAVENOUS at 15:56

## 2024-02-06 ASSESSMENT — LIFESTYLE VARIABLES
TOTAL SCORE: 0
HAVE YOU EVER FELT YOU SHOULD CUT DOWN ON YOUR DRINKING: NO
HOW MANY TIMES IN THE PAST YEAR HAVE YOU HAD 5 OR MORE DRINKS IN A DAY: 0
EVER FELT BAD OR GUILTY ABOUT YOUR DRINKING: NO
DOES PATIENT WANT TO STOP DRINKING: NO
CONSUMPTION TOTAL: NEGATIVE
AVERAGE NUMBER OF DAYS PER WEEK YOU HAVE A DRINK CONTAINING ALCOHOL: 0
HAVE PEOPLE ANNOYED YOU BY CRITICIZING YOUR DRINKING: NO
EVER HAD A DRINK FIRST THING IN THE MORNING TO STEADY YOUR NERVES TO GET RID OF A HANGOVER: NO
ALCOHOL_USE: NO
ON A TYPICAL DAY WHEN YOU DRINK ALCOHOL HOW MANY DRINKS DO YOU HAVE: 0

## 2024-02-06 ASSESSMENT — COGNITIVE AND FUNCTIONAL STATUS - GENERAL
DRESSING REGULAR LOWER BODY CLOTHING: A LITTLE
MOVING FROM LYING ON BACK TO SITTING ON SIDE OF FLAT BED: A LITTLE
WALKING IN HOSPITAL ROOM: A LITTLE
STANDING UP FROM CHAIR USING ARMS: A LITTLE
CLIMB 3 TO 5 STEPS WITH RAILING: A LOT
DAILY ACTIVITIY SCORE: 23
MOBILITY SCORE: 19
SUGGESTED CMS G CODE MODIFIER DAILY ACTIVITY: CI
SUGGESTED CMS G CODE MODIFIER MOBILITY: CK

## 2024-02-06 ASSESSMENT — ENCOUNTER SYMPTOMS
SPUTUM PRODUCTION: 0
FEVER: 0

## 2024-02-06 ASSESSMENT — PATIENT HEALTH QUESTIONNAIRE - PHQ9
1. LITTLE INTEREST OR PLEASURE IN DOING THINGS: NOT AT ALL
2. FEELING DOWN, DEPRESSED, IRRITABLE, OR HOPELESS: NOT AT ALL
SUM OF ALL RESPONSES TO PHQ9 QUESTIONS 1 AND 2: 0

## 2024-02-06 ASSESSMENT — PAIN DESCRIPTION - PAIN TYPE
TYPE: ACUTE PAIN

## 2024-02-06 NOTE — ED NOTES
Med Rec complete per patient   Allergies reviewed  Antibiotics in the past 30 days:no  Anticoagulant in past 14 days:yes  Anticoagulant: Eliquis (per pt) Last dose:unk  Pharmacy patient utilizes:[facility]    Pt states he hasn't taken medications in many months  Pt states he was on Plavix but last time was many months ago  Pt states he prefers [facility] in [place] however that store is permanently closed    Pt states he's on whatever was given to him at [facility] pt mentioned Eliquis. Unable to verify through chart review documents

## 2024-02-06 NOTE — ASSESSMENT & PLAN NOTE
Left lower extremity duplex noted with acute/subacute DVT in common femoral, femoral and popliteal vein with area of complete occlusion and areas of partial occlusion.  DVT in  peroneal and posterior tibial vein.   IVC filter placement by Dr. Schrader on 2/8  Therapeutic Lovenox, switch to oral anticoagulant at discharge

## 2024-02-06 NOTE — ED NOTES
[facility]ed [facility] in [place] Phone: [phone] [facility] who stated patient received enoxaparin 70 mg (1 mg/kg) dose on 2/6 at 0720 AM    Given CrCl 20 (Scr 3.85) therapeutic enoxaparin would be dosed once daily in kidney failure. Discussed case with Dr. Pappas heparin drip to start on 2/7 at 0600.     Gabrielle Prasad Pharm.D., [facility]  ER Clinical Pharmacist

## 2024-02-06 NOTE — ED PROVIDER NOTES
ED Provider Note    CHIEF COMPLAINT  Chief Complaint   Patient presents with    Leg Swelling     Transfer for possible DVT.       EXTERNAL RECORDS REVIEWED  Outpatient Notes I reviewed the notes from today the patient had lab work performed, he had an arterial study of his left lower extremity that was negative for arterial occlusion    HPI/ROS  LIMITATION TO HISTORY   Select: : None  OUTSIDE HISTORIAN(S):  None    Jesus Gorman is a 58 y.o. male who presents stating that he has a history of pulmonary embolism, DVT, heroin abuse, he had a previous carotid aneurysm repair, he presented to [facility] with swelling and pain in the left lower extremity.  He was diagnosed with DVT and started on Eliquis.  After taking Eliquis for 2 days he returned today with much worsening leg swelling.  There was concern there that he was not responding to the Eliquis and did not have a pulse.  They performed arterial Doppler that was negative for arterial occlusion.  They transferred here for possible lysis of DVT secondary to his extreme swelling of his lower extremity.  The patient denies chest pain or shortness of breath.    PAST MEDICAL HISTORY   has a past medical history of Bypass graft mechanical complication (HCC), Fall, Hernia (2009), Heroin abuse (HCC), Indigestion, Infectious disease (2012), and IV drug user.    SURGICAL HISTORY   has a past surgical history that includes other cardiac surgery; other orthopedic surgery (2012); other orthopedic surgery (1985); other orthopedic surgery (2009); other orthopedic surgery (1991); other orthopedic surgery (1992); other orthopedic surgery (1978); other orthopedic surgery (1983); thrombectomy (7/28/2013); angiogram (7/28/2013); femur nailing intramedullary (Right, 6/15/2021); si screw (Bilateral, 6/15/2021); orif, fracture, tibia, plateau (Right, 6/22/2021); and orif, wrist (Right, 6/22/2021).    FAMILY HISTORY  History reviewed. No pertinent family  "history.    SOCIAL HISTORY  Social History     Tobacco Use    Smoking status: Every Day     Current packs/day: 1.00     Average packs/day: 1 pack/day for 34.0 years (34.0 ttl pk-yrs)     Types: Cigarettes    Smokeless tobacco: Never   Substance and Sexual Activity    Alcohol use: No    Drug use: Yes     Comment: heroine    Sexual activity: Not on file       CURRENT MEDICATIONS  Home Medications       Reviewed by Abigail Judd R.N. (Registered Nurse) on 02/06/24 at 1155  Med List Status: Not Addressed     Medication Last Dose Status   acetaminophen (TYLENOL) 325 MG Tab  Active   aspirin (ASA) 325 MG TABS  Active   clopidogrel (PLAVIX) 75 MG TABS  Active   docusate sodium (RA COL-RITE) 100 MG Cap  Active   famotidine (PEPCID) 20 MG Tab  Active   furosemide (LASIX) 40 MG Tab  Active   gabapentin (NEURONTIN) 300 MG CAPS  Active   morphine SR (MS CONTIN) 60 MG TB12  Active   oxycodone, immediate release, (ROXICODONE) 5 MG TABS  Active   pantoprazole (PROTONIX) 40 MG TBEC  Active   potassium chloride SA (KDUR) 20 MEQ Tab CR  Active   rivaroxaban (XARELTO) 20 MG Tab tablet  Active   tamsulosin (FLOMAX) 0.4 MG capsule  Active   temazepam (RESTORIL) 15 MG CAPS  Active                    ALLERGIES  No Known Allergies    PHYSICAL EXAM  VITAL SIGNS: /78   Pulse 73   Temp 37.2 °C (98.9 °F) (Temporal)   Resp 18   Ht 1.727 m (5' 8\")   Wt 70.3 kg (155 lb)   SpO2 97%   BMI 23.57 kg/m²    Constitutional: Alert.  HENT: No signs of trauma, Bilateral external ears normal, Nose normal.   Eyes: Pupils are equal and reactive, Conjunctiva normal, Non-icteric.   Neck: Normal range of motion, No tenderness, Supple, No stridor.   Lymphatic: No lymphadenopathy noted.   Cardiovascular: Regular rate and rhythm, no murmurs.   Thorax & Lungs: Normal breath sounds, No respiratory distress, No wheezing, No chest tenderness.   Abdomen: Bowel sounds normal, Soft, No tenderness, No peritoneal signs, No masses, No pulsatile masses. "   Skin: Warm, Dry, No erythema, No rash.   Back: No bony tenderness, No CVA tenderness.   Extremities: Extreme swelling of the left lower extremity, warm to touch.  Musculoskeletal: Good range of motion in all major joints. No tenderness to palpation or major deformities noted.   Neurologic: Alert , Normal motor function, Normal sensory function, No focal deficits noted.   Psychiatric: Affect normal, Judgment normal, Mood normal.       DIAGNOSTIC STUDIES / PROCEDURES      LABS  Labs were reviewed from the transferring facility, the patient had labs done today that showed an elevated white count of 11.8.  His H&H is low at 9 and 27.  His CO2 slightly low at 18.  His BNP is slightly elevated 552.  Lactic acid normal.  Anion gap normal.        RADIOLOGY  I have independently interpreted the diagnostic imaging associated with this visit and am waiting the final reading from the radiologist.   My preliminary interpretation is as follows: DVT  Radiologist interpretation:     US-EXTREMITY VENOUS LOWER UNILAT LEFT           DVT    COURSE & MEDICAL DECISION MAKING    ED Observation Status? No; Patient does not meet criteria for ED Observation.     INITIAL ASSESSMENT, COURSE AND PLAN  Care Narrative:     The patient presents with worsening swelling of his known DVT.  I discussed the case with  vascular surgeon who encouraged me to call interventional radiology who might be able to perform lysis on the patient's DVT.  I then discussed case with Dr. Jamil Nicole interventional radiologist who encouraged me to admit the patient on IV heparin, repeat the DVT ultrasound, check the clinical findings of the patient tomorrow and the patient possibly will be a candidate for lysis of DVT.      ADDITIONAL PROBLEM LIST  History of pulmonary embolism, DVT  DISPOSITION AND DISCUSSIONS  I have discussed management of the patient with the following physicians and SANDY's: Dr. Bhatia of vascular surgery and Dr. Nicole of interventional  radiology.  I spoke with the hospitalist to assess the patient for hospitalization.    Discussion of management with other QHP or appropriate source(s): None         Barriers to care at this time, including but not limited to:  None .     Decision tools and prescription drugs considered including, but not limited to:  Repeat ultrasound performed in the emergency department .    FINAL DIAGNOSIS  1. Acute deep vein thrombosis (DVT) of other specified vein of left lower extremity (HCC)           Electronically signed by: Reg Lomeli M.D., 2/6/2024 12:54 PM

## 2024-02-06 NOTE — H&P
Hospital Medicine History & Physical Note    Date of Service  2/6/2024    Primary Care Physician  Ander Way M.D.    Consultants  vascular surgery and IR     Specialist Names: Dr nicole     Code Status  Full Code    Chief Complaint  Chief Complaint   Patient presents with    Leg Swelling     Transfer for possible DVT.       History of Presenting Illness  Jesus Gorman is a 58 y.o. male with past medical history of DVT which was provoked from surgery, CABG, cardiac surgery, carotid thrombectomy on Plavix who presented 2/6/2024 transfer from [facility] for worsening left lower extremity DVT.  Patient had arterial Doppler ultrasound which is negative for occlusion.  Transferred here for lysis of DVT.      Patient seen and examined at bedside.  Vital remained stable.  On room air saturating 90%.  No labs available to review.  Left lower extremity duplex noted with acute/subacute DVT in common femoral, femoral and popliteal vein with area of complete occlusion and areas of partial occlusion.  DVT in  peroneal and posterior tibial vein.     ED physician discussed case with vascular Dr.  recommended IR evaluation for lysis.  IR Dr. Nicole recommended to start on heparin drip and repeat ultrasound and he will decide tomorrow for  DVT lysis depending upon his clinical improvement.      I spoke and discussed the case with the ER physician, Dr. Kee     Patient will be admitted to the hospital for further evaluation and management for   Extensive left lower extremity DV, bilateral lower extremity cellulitis requiring IV antibiotics..  He will require IV heparin drip and IR intervention for thrombectomy.Requiring close monitoring for toxicity while on IV heparin drip.Requiring IV narcotics need close monitoring for toxicity.    .     I discussed the plan of care with patient.    Review of Systems  Review of Systems   Constitutional:  Positive for malaise/fatigue. Negative for fever.   Respiratory:  Negative for  sputum production.    Cardiovascular:  Positive for leg swelling.       Past Medical History   has a past medical history of Bypass graft mechanical complication (HCC), Fall, Hernia (2009), Heroin abuse (HCC), Indigestion, Infectious disease (2012), and IV drug user.    Surgical History   has a past surgical history that includes other cardiac surgery; other orthopedic surgery (2012); other orthopedic surgery (1985); other orthopedic surgery (2009); other orthopedic surgery (1991); other orthopedic surgery (1992); other orthopedic surgery (1978); other orthopedic surgery (1983); thrombectomy (7/28/2013); angiogram (7/28/2013); femur nailing intramedullary (Right, 6/15/2021); si screw (Bilateral, 6/15/2021); orif, fracture, tibia, plateau (Right, 6/22/2021); and orif, wrist (Right, 6/22/2021).     Family History  family history is not on file.   Family history reviewed with patient. There is no family history that is pertinent to the chief complaint.     Social History   reports that he has been smoking cigarettes. He has a 34.0 pack-year smoking history. He has never used smokeless tobacco. He reports current drug use. He reports that he does not drink alcohol.    Allergies  No Known Allergies    Medications  None       Physical Exam  Temp:  [37.2 °C (98.9 °F)] 37.2 °C (98.9 °F)  Pulse:  [73] 73  Resp:  [18] 18  BP: (125)/(78) 125/78  SpO2:  [97 %] 97 %  Blood Pressure: 125/78   Temperature: 37.2 °C (98.9 °F)   Pulse: 73   Respiration: 18   Pulse Oximetry: 97 %       Physical Exam  Constitutional:       Appearance: Normal appearance.   HENT:      Mouth/Throat:      Mouth: Mucous membranes are dry.   Cardiovascular:      Rate and Rhythm: Normal rate and regular rhythm.      Pulses: Normal pulses.      Heart sounds: Normal heart sounds.   Pulmonary:      Effort: Pulmonary effort is normal. No respiratory distress.      Breath sounds: Normal breath sounds.   Musculoskeletal:      Comments: Extensive swelling of left  "lower extremity compared to right lower extremity.  Bilateral diffuse erythema, tenderness, warm to touch.  Noted to have some crusted wound on right lower extremity.    Neurological:      Mental Status: He is alert.         Laboratory:          No results for input(s): \"ALTSGPT\", \"ASTSGOT\", \"ALKPHOSPHAT\", \"TBILIRUBIN\", \"DBILIRUBIN\", \"GAMMAGT\", \"AMYLASE\", \"LIPASE\", \"ALB\", \"PREALBUMIN\", \"GLUCOSE\" in the last 72 hours.      No results for input(s): \"NTPROBNP\" in the last 72 hours.      No results for input(s): \"TROPONINT\" in the last 72 hours.    Imaging:  US-EXTREMITY VENOUS LOWER UNILAT LEFT   Final Result          no X-Ray or EKG requiring interpretation    Assessment/Plan:  Justification for Admission Status  I anticipate this patient will require at least two midnights for appropriate medical management, necessitating inpatient admission  for   Extensive left lower extremity DV, bilateral lower extremity cellulitis requiring IV antibiotics..  He will require IV heparin drip and IR intervention for thrombectomy.Requiring close monitoring for toxicity while on IV heparin drip.Requiring IV narcotics need close monitoring for toxicity.      * Acute deep vein thrombosis (DVT) of left lower extremity, unspecified vein (HCC)- (present on admission)  Assessment & Plan  Left lower extremity duplex noted with acute/subacute DVT in common femoral, femoral and popliteal vein with area of complete occlusion and areas of partial occlusion.  DVT in  peroneal and posterior tibial vein.     Begin anticoagulation with heparin - I am titrating a heparin drip based upon serial anti-Xa determinations .  Requiring IV narcotics for pain management, monitor for respiratory toxicity  IR Dr. Nicole recommended to start on heparin drip and repeat ultrasound and he will decide tomorrow for DVT lysis depending upon his clinical improvement.      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  Admission order was placed.  Labs ordered for follow-up.  I independently reviewed pertinent radiographic images and the radiologist's reports since admission and ordered additional follow up radiographic studies.  The details of the available patient records in [facility] (including laboratory tests, culture data, medications, imaging, and other pertinent diagnostic tests) and that information was utilized as warranted in today's medical decision making for this patient.  I personally evaluated the patient condition at bedside.     Care interventions include:   Review of interval medical and surgical history, current medications and outpatient medication reconciliation, interval imaging studies and radiologist interpretation, and interval laboratory values.

## 2024-02-06 NOTE — ED NOTES
Transport back @ bedside to take pt to floor. NAD. VSS. Respirations equal and unlabored. All belongings with pt.

## 2024-02-06 NOTE — ED NOTES
After transport took pt. Floor RN called and stated the room is not not ready. Pt brought back to yellow 58.

## 2024-02-06 NOTE — ED TRIAGE NOTES
Chief Complaint   Patient presents with    Leg Swelling     Transfer for possible DVT.     Leg swelling for approx 1 week. Pt currently taking eliquis

## 2024-02-07 ENCOUNTER — APPOINTMENT (OUTPATIENT)
Dept: RADIOLOGY | Facility: MEDICAL CENTER | Age: 59
DRG: 300 | End: 2024-02-07
Attending: STUDENT IN AN ORGANIZED HEALTH CARE EDUCATION/TRAINING PROGRAM
Payer: COMMERCIAL

## 2024-02-07 ENCOUNTER — APPOINTMENT (OUTPATIENT)
Dept: RADIOLOGY | Facility: MEDICAL CENTER | Age: 59
DRG: 300 | End: 2024-02-07
Payer: COMMERCIAL

## 2024-02-07 PROBLEM — N17.9 ACUTE RENAL FAILURE (ARF) (HCC): Status: ACTIVE | Noted: 2024-02-07

## 2024-02-07 PROBLEM — I82.4Y3 DVT, LOWER EXTREMITY, PROXIMAL, ACUTE, BILATERAL (HCC): Status: ACTIVE | Noted: 2024-02-07

## 2024-02-07 LAB
ANION GAP SERPL CALC-SCNC: 9 MMOL/L (ref 7–16)
APTT PPP: 44.9 SEC (ref 24.7–36)
APTT PPP: 54.2 SEC (ref 24.7–36)
BUN SERPL-MCNC: 53 MG/DL (ref 8–22)
CALCIUM SERPL-MCNC: 7.9 MG/DL (ref 8.5–10.5)
CHLORIDE SERPL-SCNC: 112 MMOL/L (ref 96–112)
CO2 SERPL-SCNC: 21 MMOL/L (ref 20–33)
CREAT SERPL-MCNC: 3.26 MG/DL (ref 0.5–1.4)
ERYTHROCYTE [DISTWIDTH] IN BLOOD BY AUTOMATED COUNT: 46.3 FL (ref 35.9–50)
GFR SERPLBLD CREATININE-BSD FMLA CKD-EPI: 21 ML/MIN/1.73 M 2
GLUCOSE SERPL-MCNC: 84 MG/DL (ref 65–99)
HCT VFR BLD AUTO: 29.9 % (ref 42–52)
HGB BLD-MCNC: 9.5 G/DL (ref 14–18)
MCH RBC QN AUTO: 30.6 PG (ref 27–33)
MCHC RBC AUTO-ENTMCNC: 31.8 G/DL (ref 32.3–36.5)
MCV RBC AUTO: 96.5 FL (ref 81.4–97.8)
PLATELET # BLD AUTO: 328 K/UL (ref 164–446)
PMV BLD AUTO: 9.3 FL (ref 9–12.9)
POTASSIUM SERPL-SCNC: 5.1 MMOL/L (ref 3.6–5.5)
RBC # BLD AUTO: 3.1 M/UL (ref 4.7–6.1)
SCCMEC + MECA PNL NOSE NAA+PROBE: NEGATIVE
SCCMEC + MECA PNL NOSE NAA+PROBE: NEGATIVE
SODIUM SERPL-SCNC: 142 MMOL/L (ref 135–145)
WBC # BLD AUTO: 11.5 K/UL (ref 4.8–10.8)

## 2024-02-07 PROCEDURE — 85027 COMPLETE CBC AUTOMATED: CPT

## 2024-02-07 PROCEDURE — 85730 THROMBOPLASTIN TIME PARTIAL: CPT | Mod: 91

## 2024-02-07 PROCEDURE — 700105 HCHG RX REV CODE 258: Performed by: STUDENT IN AN ORGANIZED HEALTH CARE EDUCATION/TRAINING PROGRAM

## 2024-02-07 PROCEDURE — 93971 EXTREMITY STUDY: CPT | Mod: RT

## 2024-02-07 PROCEDURE — 93971 EXTREMITY STUDY: CPT | Mod: 26,RT | Performed by: INTERNAL MEDICINE

## 2024-02-07 PROCEDURE — 99233 SBSQ HOSP IP/OBS HIGH 50: CPT | Performed by: STUDENT IN AN ORGANIZED HEALTH CARE EDUCATION/TRAINING PROGRAM

## 2024-02-07 PROCEDURE — 770006 HCHG ROOM/CARE - MED/SURG/GYN SEMI*

## 2024-02-07 PROCEDURE — 36415 COLL VENOUS BLD VENIPUNCTURE: CPT

## 2024-02-07 PROCEDURE — A9270 NON-COVERED ITEM OR SERVICE: HCPCS | Performed by: STUDENT IN AN ORGANIZED HEALTH CARE EDUCATION/TRAINING PROGRAM

## 2024-02-07 PROCEDURE — 700111 HCHG RX REV CODE 636 W/ 250 OVERRIDE (IP): Performed by: STUDENT IN AN ORGANIZED HEALTH CARE EDUCATION/TRAINING PROGRAM

## 2024-02-07 PROCEDURE — 80048 BASIC METABOLIC PNL TOTAL CA: CPT

## 2024-02-07 PROCEDURE — 700102 HCHG RX REV CODE 250 W/ 637 OVERRIDE(OP): Performed by: STUDENT IN AN ORGANIZED HEALTH CARE EDUCATION/TRAINING PROGRAM

## 2024-02-07 PROCEDURE — 74176 CT ABD & PELVIS W/O CONTRAST: CPT

## 2024-02-07 RX ADMIN — HEPARIN SODIUM 2600 UNITS: 1000 INJECTION, SOLUTION INTRAVENOUS; SUBCUTANEOUS at 21:20

## 2024-02-07 RX ADMIN — ACETAMINOPHEN 1000 MG: 500 TABLET ORAL at 18:30

## 2024-02-07 RX ADMIN — ACETAMINOPHEN 1000 MG: 500 TABLET ORAL at 23:31

## 2024-02-07 RX ADMIN — OXYCODONE 5 MG: 5 TABLET ORAL at 08:23

## 2024-02-07 RX ADMIN — HEPARIN SODIUM 2600 UNITS: 1000 INJECTION, SOLUTION INTRAVENOUS; SUBCUTANEOUS at 14:18

## 2024-02-07 RX ADMIN — AMPICILLIN AND SULBACTAM 3 G: 1; 2 INJECTION, POWDER, FOR SOLUTION INTRAMUSCULAR; INTRAVENOUS at 05:12

## 2024-02-07 ASSESSMENT — PAIN DESCRIPTION - PAIN TYPE
TYPE: ACUTE PAIN

## 2024-02-07 ASSESSMENT — ENCOUNTER SYMPTOMS
SHORTNESS OF BREATH: 0
COUGH: 0
MYALGIAS: 1

## 2024-02-07 NOTE — CONSULTS
"Surgery Urology Consultation    Date of Service  2/7/2024    Referring Physician  Leander Pappas M.D.    Consulting Physician  AGUSTIN Ma    Reason for Consultation  hydronephrosis    History of Presenting Illness  58 y.o. male who presented 2/6/2024 with severe right hydronephrosis. Patient does report a history of 1 kidney stone in the last few years, it is unclear when this was as patient reports a \"few\" years ago and patient did not have laser lithotripsy for this, he passed it on his own. He is reporting that since the stone a few years ago, urinating has not felt the same. He reports  that he does not feel as though he is completely empyting. Patient does report back pain but also reported that he has broken his back. Patient denies hematuria and dysuria prior to douglas catheter insertion.    Patient reports that he has not been seen in the ED for urinary retention before. Has not had recurrent UTIs, no ureteral/bladder cancer.     Review of Systems  ROS    Past Medical History   has a past medical history of Bypass graft mechanical complication (HCC), Fall, Hernia (2009), Heroin abuse (HCC), Indigestion, Infectious disease (2012), and IV drug user.    He has no past medical history of Renal disorder.    Surgical History   has a past surgical history that includes other cardiac surgery; other orthopedic surgery (2012); other orthopedic surgery (1985); other orthopedic surgery (2009); other orthopedic surgery (1991); other orthopedic surgery (1992); other orthopedic surgery (1978); other orthopedic surgery (1983); thrombectomy (7/28/2013); angiogram (7/28/2013); femur nailing intramedullary (Right, 6/15/2021); si screw (Bilateral, 6/15/2021); orif, fracture, tibia, plateau (Right, 6/22/2021); and orif, wrist (Right, 6/22/2021).    Family History  family history is not on file.    Social History   reports that he has been smoking cigarettes. He has a 34.0 pack-year smoking history. He has never used " smokeless tobacco. He reports current drug use. He reports that he does not drink alcohol.    Medications  On heparin gtt for DVT    Allergies  No Known Allergies    Physical Exam  Temp:  [35.9 °C (96.6 °F)-37.2 °C (98.9 °F)] 35.9 °C (96.6 °F)  Pulse:  [70-86] 80  Resp:  [15-18] 16  BP: ()/(50-78) 105/65  SpO2:  [95 %-97 %] 97 %    Physical Exam    Fluids      Laboratory  Recent Labs     02/06/24  1358   WBC 11.4*   RBC 2.97*   HEMOGLOBIN 9.2*   HEMATOCRIT 28.1*   MCV 94.6   MCH 31.0   MCHC 32.7   RDW 44.4   PLATELETCT 290   MPV 9.4     Recent Labs     02/06/24  1358   SODIUM 137   POTASSIUM 4.5   CHLORIDE 105   CO2 20   GLUCOSE 106*   BUN 61*   CREATININE 3.85*   CALCIUM 8.1*     Recent Labs     02/06/24  1358   APTT 40.8*   INR 1.64*                 Imaging  HISTORY/REASON FOR EXAM:  Abnormal Labs        TECHNIQUE/EXAM DESCRIPTION:  Renal ultrasound.     COMPARISON:  CT scan June 14, 2021     FINDINGS:     The right kidney measures 10.61 cm.  The entire kidney is not well-visualized. There is severe right hydronephrosis. There are a few scattered small cysts. There are no renal calculi.     The left kidney measures 13.53 cm. Multiple renal cysts are identified. There also appears to be moderate hydronephrosis. There are no renal calculi.     The bladder is distended.           IMPRESSION:     1.  Severe right-sided hydronephrosis.     2.  Multiple left renal cysts with apparent moderate hydronephrosis.     3.  Distended urinary bladder.    Assessment/Plan    At this time, douglas catheter in place, urine clear yellow- ~500 in bag.     Continuing to follow Creatinine trends, WBC and fevers.     I have ordered a STAT CT Renal Colic to rule out a mid ureteral stone. Douglas to remain in place. Please keep NPO at this time until imaging results to assess for further interventions done by Urology team

## 2024-02-07 NOTE — PROGRESS NOTES
Patient refused to put a new IV for Heparin drip and requested to perform it with VAT this AM. Maira RN explained the importance of another IV line but patient still refused as he had multiple unsuccessful IV attempts yesterday without the ultrasound.

## 2024-02-07 NOTE — CARE PLAN
The patient is Watcher - Medium risk of patient condition declining or worsening    Shift Goals  Clinical Goals: By 1am, patient will verbalized tolerable level of pain 4/10  Patient Goals: rest and sleep  Family Goals: none present    Progress made toward(s) clinical / shift goals: After repositioning, provided comfort measures and administered Acetaminophen 1000mg tab. PO, seen patient comfortably sleeping without any signs of pain and discomfort.       Patient is not progressing towards the following goals: N/A

## 2024-02-07 NOTE — PROGRESS NOTES
APTT 14.8 (26/24 at 13:58) weight based protocol Heparin drip 1050 units/hr. at 21 ml/hr. started with HUDSON Rao (charge nurse). Monitored sx/sy of bleeding. Ordered APTT monitoring after 6hrs. at 12nn.

## 2024-02-07 NOTE — PROGRESS NOTES
Indwelling cath. inserted, patient tolerated well, secured on right inner thigh. Instructed the patient on strict NPO post midnight.

## 2024-02-07 NOTE — PROGRESS NOTES
Patient urinated 450ml, yellow. PVR showed 1413ml and distended bladder palpated. Patient has 6am Heparin drip order, notified HUDSON Rao (charge nurse).

## 2024-02-07 NOTE — CARE PLAN
The patient is Stable - Low risk of patient condition declining or worsening    Shift Goals  Clinical Goals: By 1am, patient will verbalized tolerable level of pain 4/10  Patient Goals: rest and sleep  Family Goals: none present    Progress made toward(s) clinical / shift goals:    Problem: Knowledge Deficit - Standard  Goal: Patient and family/care givers will demonstrate understanding of plan of care, disease process/condition, diagnostic tests and medications  Outcome: Progressing     Problem: Pain - Standard  Goal: Alleviation of pain or a reduction in pain to the patient’s comfort goal  Outcome: Progressing   Pain managed well with PRN medication at this time.  Patient uses pharmacological and non pharmacological pain-relief strategies. Patient displays improvement in mood, coping.    Patient is not progressing towards the following goals:

## 2024-02-07 NOTE — CARE PLAN
The patient is Stable - Low risk of patient condition declining or worsening    Shift Goals  Clinical Goals: control pain    Progress made toward(s) clinical / shift goals:    Problem: Knowledge Deficit - Standard  Goal: Patient and family/care givers will demonstrate understanding of plan of care, disease process/condition, diagnostic tests and medications  Outcome: Progressing     Problem: Pain - Standard  Goal: Alleviation of pain or a reduction in pain to the patient’s comfort goal  Outcome: Progressing

## 2024-02-07 NOTE — PROGRESS NOTES
Surgical Progress Note    Author: AGUSTIN Ma Date & Time created: 2024   12:43 PM     Interval Events:  Bilateral hydronephosis.     ROS  Hemodynamics:  Temp (24hrs), Av.6 °C (97.8 °F), Min:35.9 °C (96.6 °F), Max:37.1 °C (98.7 °F)  Temperature: 35.9 °C (96.6 °F)  Pulse  Av.5  Min: 70  Max: 86   Blood Pressure: 105/65     Respiratory:    Respiration: 16, Pulse Oximetry: 97 %       Neuro:  GCS  15     Fluids:    Intake/Output Summary (Last 24 hours) at 2024 1243  Last data filed at 2024 1000  Gross per 24 hour   Intake 1297.77 ml   Output 5950 ml   Net -4652.23 ml     Weight: 86.5 kg (190 lb 11.2 oz)  Current Diet Order   Procedures    Diet NPO Restrict to: Strict     Physical Exam  Labs:  Recent Results (from the past 24 hour(s))   aPTT    Collection Time: 24  1:58 PM   Result Value Ref Range    APTT 40.8 (H) 24.7 - 36.0 sec   Prothrombin Time    Collection Time: 24  1:58 PM   Result Value Ref Range    PT 19.7 (H) 12.0 - 14.6 sec    INR 1.64 (H) 0.87 - 1.13   Heparin Xa (Unfractionated)    Collection Time: 24  1:58 PM   Result Value Ref Range    Heparin Xa (UFH) >1.10 (HH) IU/mL   CBC WITH DIFFERENTIAL    Collection Time: 24  1:58 PM   Result Value Ref Range    WBC 11.4 (H) 4.8 - 10.8 K/uL    RBC 2.97 (L) 4.70 - 6.10 M/uL    Hemoglobin 9.2 (L) 14.0 - 18.0 g/dL    Hematocrit 28.1 (L) 42.0 - 52.0 %    MCV 94.6 81.4 - 97.8 fL    MCH 31.0 27.0 - 33.0 pg    MCHC 32.7 32.3 - 36.5 g/dL    RDW 44.4 35.9 - 50.0 fL    Platelet Count 290 164 - 446 K/uL    MPV 9.4 9.0 - 12.9 fL    Neutrophils-Polys 80.30 (H) 44.00 - 72.00 %    Lymphocytes 9.20 (L) 22.00 - 41.00 %    Monocytes 6.40 0.00 - 13.40 %    Eosinophils 2.50 0.00 - 6.90 %    Basophils 0.50 0.00 - 1.80 %    Immature Granulocytes 1.10 (H) 0.00 - 0.90 %    Nucleated RBC 0.00 0.00 - 0.20 /100 WBC    Neutrophils (Absolute) 9.13 (H) 1.82 - 7.42 K/uL    Lymphs (Absolute) 1.05 1.00 - 4.80 K/uL    Monos (Absolute) 0.73  0.00 - 0.85 K/uL    Eos (Absolute) 0.28 0.00 - 0.51 K/uL    Baso (Absolute) 0.06 0.00 - 0.12 K/uL    Immature Granulocytes (abs) 0.12 (H) 0.00 - 0.11 K/uL    NRBC (Absolute) 0.00 K/uL   Basic Metabolic Panel    Collection Time: 02/06/24  1:58 PM   Result Value Ref Range    Sodium 137 135 - 145 mmol/L    Potassium 4.5 3.6 - 5.5 mmol/L    Chloride 105 96 - 112 mmol/L    Co2 20 20 - 33 mmol/L    Glucose 106 (H) 65 - 99 mg/dL    Bun 61 (H) 8 - 22 mg/dL    Creatinine 3.85 (H) 0.50 - 1.40 mg/dL    Calcium 8.1 (L) 8.5 - 10.5 mg/dL    Anion Gap 12.0 7.0 - 16.0   MAGNESIUM    Collection Time: 02/06/24  1:58 PM   Result Value Ref Range    Magnesium 1.7 1.5 - 2.5 mg/dL   PHOSPHORUS    Collection Time: 02/06/24  1:58 PM   Result Value Ref Range    Phosphorus 4.7 (H) 2.5 - 4.5 mg/dL   PROCALCITONIN    Collection Time: 02/06/24  1:58 PM   Result Value Ref Range    Procalcitonin 1.63 (H) <0.25 ng/mL   CRP QUANTITIVE (NON-CARDIAC)    Collection Time: 02/06/24  1:58 PM   Result Value Ref Range    Stat C-Reactive Protein 7.46 (H) 0.00 - 0.75 mg/dL   Sed Rate    Collection Time: 02/06/24  1:58 PM   Result Value Ref Range    Sed Rate Westergren >140 (H) 0 - 20 mm/hour   ESTIMATED GFR    Collection Time: 02/06/24  1:58 PM   Result Value Ref Range    GFR (CKD-EPI) 17 (A) >60 mL/min/1.73 m 2   URINALYSIS    Collection Time: 02/06/24  9:40 PM    Specimen: Urine, Clean Catch   Result Value Ref Range    Color Yellow     Character Clear     Specific Gravity 1.010 <1.035    Ph 5.5 5.0 - 8.0    Glucose Negative Negative mg/dL    Ketones Negative Negative mg/dL    Protein Negative Negative mg/dL    Bilirubin Negative Negative    Urobilinogen, Urine 0.2 Negative    Nitrite Negative Negative    Leukocyte Esterase Small (A) Negative    Occult Blood Negative Negative    Micro Urine Req Microscopic    URINE SODIUM RANDOM    Collection Time: 02/06/24  9:40 PM   Result Value Ref Range    Sodium, Urine -per volume <20 mmol/L   URINE POTASSIUM RANDOM     Collection Time: 02/06/24  9:40 PM   Result Value Ref Range    Potassium 30.0 mmol/L   URINE CHLORIDE RANDOM    Collection Time: 02/06/24  9:40 PM   Result Value Ref Range    Chloride, Urine-per volume <20 mmol/L   PROTEIN/CREAT RATIO URINE    Collection Time: 02/06/24  9:40 PM   Result Value Ref Range    Total Protein, Urine 9.0 0.0 - 15.0 mg/dL    Creatinine, Random Urine 54.31 mg/dL    Protein Creatinine Ratio 166 (H) 15 - 68 mg/g   URINE MICROSCOPIC (W/UA)    Collection Time: 02/06/24  9:40 PM   Result Value Ref Range    WBC 5-10 (A) /hpf    RBC 0-2 (A) /hpf    Bacteria Moderate (A) None /hpf    Epithelial Cells Negative /hpf    Hyaline Cast 0-2 /lpf    Budding Yeast Present (A) Absent /hpf    Hyphae Yeast Present (A) Absent /hpf   S. Aureus By PCR, Nasal Complete    Collection Time: 02/06/24 10:55 PM    Specimen: Respirate   Result Value Ref Range    Staph aureus by PCR Negative Negative    MRSA by PCR Negative Negative     Medical Decision Making, by Problem:  Active Hospital Problems    Diagnosis     Acute deep vein thrombosis (DVT) of left lower extremity, unspecified vein (HCC) [I82.402]     Cellulitis of lower extremity [L03.119]     Occlusion of right carotid artery [I65.21]     Occlusion of right brachial artery (HCC) [I70.208]     Hepatitis C [B19.20]      Plan:  CT scan reviewed. From Urology standpoint, okay to eat. No surgical interventions at this time. Will order renal US in 48 hours. Please keep douglas in place    Quality Measures:  Quality-Core Measures    Discussed patient condition with patient

## 2024-02-07 NOTE — PROGRESS NOTES
4 Eyes Skin Assessment Completed by Torsten RN and Simon RN.    Head WDL  Ears WDL  Nose WDL  Mouth WDL  Neck WDL  Breast/Chest Scab  Shoulder Blades WDL  Spine WDL  (R) Arm/Elbow/Hand WDL  (L) Arm/Elbow/Hand Abrasion  Abdomen WDL  Groin WDL  Scrotum/Coccyx/Buttocks Redness and Blanching  (R) Leg Edema, Ulcer  (L) Leg Edema, Ulcers  (R) Heel/Foot/Toe Edema  (L) Heel/Foot/Toe Edema, weeping from lateral ulceration          Devices In Places NA      Interventions In Place Pillows    Possible Skin Injury Yes    Pictures Uploaded Into Epic Yes  Wound Consult Placed Yes  RN Wound Prevention Protocol Ordered Yes

## 2024-02-08 ENCOUNTER — APPOINTMENT (OUTPATIENT)
Dept: RADIOLOGY | Facility: MEDICAL CENTER | Age: 59
DRG: 300 | End: 2024-02-08
Attending: STUDENT IN AN ORGANIZED HEALTH CARE EDUCATION/TRAINING PROGRAM
Payer: COMMERCIAL

## 2024-02-08 LAB
ANION GAP SERPL CALC-SCNC: 12 MMOL/L (ref 7–16)
APTT PPP: 66.5 SEC (ref 24.7–36)
APTT PPP: 89.4 SEC (ref 24.7–36)
BASOPHILS # BLD AUTO: 1.2 % (ref 0–1.8)
BASOPHILS # BLD: 0.11 K/UL (ref 0–0.12)
BUN SERPL-MCNC: 48 MG/DL (ref 8–22)
CALCIUM SERPL-MCNC: 7.4 MG/DL (ref 8.5–10.5)
CHLORIDE SERPL-SCNC: 108 MMOL/L (ref 96–112)
CO2 SERPL-SCNC: 21 MMOL/L (ref 20–33)
CREAT SERPL-MCNC: 2.84 MG/DL (ref 0.5–1.4)
EOSINOPHIL # BLD AUTO: 0.19 K/UL (ref 0–0.51)
EOSINOPHIL NFR BLD: 2.1 % (ref 0–6.9)
ERYTHROCYTE [DISTWIDTH] IN BLOOD BY AUTOMATED COUNT: 42.7 FL (ref 35.9–50)
GFR SERPLBLD CREATININE-BSD FMLA CKD-EPI: 25 ML/MIN/1.73 M 2
GLUCOSE SERPL-MCNC: 102 MG/DL (ref 65–99)
HCT VFR BLD AUTO: 29.5 % (ref 42–52)
HGB BLD-MCNC: 10.1 G/DL (ref 14–18)
IMM GRANULOCYTES # BLD AUTO: 0.2 K/UL (ref 0–0.11)
IMM GRANULOCYTES NFR BLD AUTO: 2.2 % (ref 0–0.9)
INR PPP: 1.16 (ref 0.87–1.13)
LYMPHOCYTES # BLD AUTO: 1.1 K/UL (ref 1–4.8)
LYMPHOCYTES NFR BLD: 12 % (ref 22–41)
MAGNESIUM SERPL-MCNC: 1.4 MG/DL (ref 1.5–2.5)
MCH RBC QN AUTO: 31.4 PG (ref 27–33)
MCHC RBC AUTO-ENTMCNC: 34.2 G/DL (ref 32.3–36.5)
MCV RBC AUTO: 91.6 FL (ref 81.4–97.8)
MONOCYTES # BLD AUTO: 0.63 K/UL (ref 0–0.85)
MONOCYTES NFR BLD AUTO: 6.9 % (ref 0–13.4)
NEUTROPHILS # BLD AUTO: 6.95 K/UL (ref 1.82–7.42)
NEUTROPHILS NFR BLD: 75.6 % (ref 44–72)
NRBC # BLD AUTO: 0 K/UL
NRBC BLD-RTO: 0 /100 WBC (ref 0–0.2)
PHOSPHATE SERPL-MCNC: 3.5 MG/DL (ref 2.5–4.5)
PLATELET # BLD AUTO: 324 K/UL (ref 164–446)
PMV BLD AUTO: 9.7 FL (ref 9–12.9)
POTASSIUM SERPL-SCNC: 4.7 MMOL/L (ref 3.6–5.5)
PROTHROMBIN TIME: 14.9 SEC (ref 12–14.6)
RBC # BLD AUTO: 3.22 M/UL (ref 4.7–6.1)
SODIUM SERPL-SCNC: 141 MMOL/L (ref 135–145)
UFH PPP CHRO-ACNC: 0.84 IU/ML
UFH PPP CHRO-ACNC: 0.85 IU/ML
WBC # BLD AUTO: 9.2 K/UL (ref 4.8–10.8)

## 2024-02-08 PROCEDURE — A9270 NON-COVERED ITEM OR SERVICE: HCPCS | Performed by: STUDENT IN AN ORGANIZED HEALTH CARE EDUCATION/TRAINING PROGRAM

## 2024-02-08 PROCEDURE — 700117 HCHG RX CONTRAST REV CODE 255: Performed by: STUDENT IN AN ORGANIZED HEALTH CARE EDUCATION/TRAINING PROGRAM

## 2024-02-08 PROCEDURE — 83735 ASSAY OF MAGNESIUM: CPT

## 2024-02-08 PROCEDURE — 770006 HCHG ROOM/CARE - MED/SURG/GYN SEMI*

## 2024-02-08 PROCEDURE — 36415 COLL VENOUS BLD VENIPUNCTURE: CPT

## 2024-02-08 PROCEDURE — 85520 HEPARIN ASSAY: CPT

## 2024-02-08 PROCEDURE — 06H03DZ INSERTION OF INTRALUMINAL DEVICE INTO INFERIOR VENA CAVA, PERCUTANEOUS APPROACH: ICD-10-PCS | Performed by: RADIOLOGY

## 2024-02-08 PROCEDURE — 85730 THROMBOPLASTIN TIME PARTIAL: CPT

## 2024-02-08 PROCEDURE — C1887 CATHETER, GUIDING: HCPCS

## 2024-02-08 PROCEDURE — 85025 COMPLETE CBC W/AUTO DIFF WBC: CPT

## 2024-02-08 PROCEDURE — 700102 HCHG RX REV CODE 250 W/ 637 OVERRIDE(OP): Performed by: STUDENT IN AN ORGANIZED HEALTH CARE EDUCATION/TRAINING PROGRAM

## 2024-02-08 PROCEDURE — 84100 ASSAY OF PHOSPHORUS: CPT

## 2024-02-08 PROCEDURE — 80048 BASIC METABOLIC PNL TOTAL CA: CPT

## 2024-02-08 PROCEDURE — 85610 PROTHROMBIN TIME: CPT

## 2024-02-08 PROCEDURE — 99291 CRITICAL CARE FIRST HOUR: CPT | Performed by: STUDENT IN AN ORGANIZED HEALTH CARE EDUCATION/TRAINING PROGRAM

## 2024-02-08 PROCEDURE — 700105 HCHG RX REV CODE 258: Performed by: STUDENT IN AN ORGANIZED HEALTH CARE EDUCATION/TRAINING PROGRAM

## 2024-02-08 PROCEDURE — 700111 HCHG RX REV CODE 636 W/ 250 OVERRIDE (IP): Performed by: STUDENT IN AN ORGANIZED HEALTH CARE EDUCATION/TRAINING PROGRAM

## 2024-02-08 RX ORDER — MIDAZOLAM HYDROCHLORIDE 1 MG/ML
.5-2 INJECTION INTRAMUSCULAR; INTRAVENOUS PRN
Status: ACTIVE | OUTPATIENT
Start: 2024-02-08 | End: 2024-02-08

## 2024-02-08 RX ORDER — ONDANSETRON 2 MG/ML
4 INJECTION INTRAMUSCULAR; INTRAVENOUS PRN
Status: ACTIVE | OUTPATIENT
Start: 2024-02-08 | End: 2024-02-08

## 2024-02-08 RX ORDER — HEPARIN SODIUM 5000 [USP'U]/100ML
0-30 INJECTION, SOLUTION INTRAVENOUS CONTINUOUS
Status: DISCONTINUED | OUTPATIENT
Start: 2024-02-08 | End: 2024-02-09

## 2024-02-08 RX ORDER — SODIUM CHLORIDE 9 MG/ML
500 INJECTION, SOLUTION INTRAVENOUS
Status: ACTIVE | OUTPATIENT
Start: 2024-02-08 | End: 2024-02-08

## 2024-02-08 RX ORDER — MAGNESIUM SULFATE HEPTAHYDRATE 40 MG/ML
4 INJECTION, SOLUTION INTRAVENOUS ONCE
Status: COMPLETED | OUTPATIENT
Start: 2024-02-08 | End: 2024-02-08

## 2024-02-08 RX ORDER — HEPARIN SODIUM 1000 [USP'U]/ML
40 INJECTION, SOLUTION INTRAVENOUS; SUBCUTANEOUS PRN
Status: DISCONTINUED | OUTPATIENT
Start: 2024-02-08 | End: 2024-02-09

## 2024-02-08 RX ORDER — MIDAZOLAM HYDROCHLORIDE 1 MG/ML
INJECTION INTRAMUSCULAR; INTRAVENOUS
Status: DISPENSED
Start: 2024-02-08 | End: 2024-02-09

## 2024-02-08 RX ADMIN — ACETAMINOPHEN 1000 MG: 500 TABLET ORAL at 16:52

## 2024-02-08 RX ADMIN — SODIUM CHLORIDE: 9 INJECTION, SOLUTION INTRAVENOUS at 05:33

## 2024-02-08 RX ADMIN — HEPARIN SODIUM 1350 UNITS/HR: 5000 INJECTION, SOLUTION INTRAVENOUS at 03:23

## 2024-02-08 RX ADMIN — HEPARIN SODIUM 15.6 UNITS/KG/HR: 5000 INJECTION, SOLUTION INTRAVENOUS at 20:39

## 2024-02-08 RX ADMIN — ACETAMINOPHEN 1000 MG: 500 TABLET ORAL at 12:22

## 2024-02-08 RX ADMIN — IOHEXOL 25 ML: 300 INJECTION, SOLUTION INTRAVENOUS at 15:00

## 2024-02-08 RX ADMIN — AMPICILLIN AND SULBACTAM 3 G: 1; 2 INJECTION, POWDER, FOR SOLUTION INTRAMUSCULAR; INTRAVENOUS at 12:34

## 2024-02-08 RX ADMIN — MAGNESIUM SULFATE HEPTAHYDRATE 4 G: 4 INJECTION, SOLUTION INTRAVENOUS at 09:04

## 2024-02-08 RX ADMIN — AMPICILLIN AND SULBACTAM 3 G: 1; 2 INJECTION, POWDER, FOR SOLUTION INTRAMUSCULAR; INTRAVENOUS at 05:38

## 2024-02-08 ASSESSMENT — ENCOUNTER SYMPTOMS
MYALGIAS: 1
COUGH: 0
SHORTNESS OF BREATH: 0

## 2024-02-08 ASSESSMENT — PAIN DESCRIPTION - PAIN TYPE
TYPE: ACUTE PAIN
TYPE: ACUTE PAIN

## 2024-02-08 ASSESSMENT — PATIENT HEALTH QUESTIONNAIRE - PHQ9
1. LITTLE INTEREST OR PLEASURE IN DOING THINGS: NOT AT ALL
SUM OF ALL RESPONSES TO PHQ9 QUESTIONS 1 AND 2: 0
2. FEELING DOWN, DEPRESSED, IRRITABLE, OR HOPELESS: NOT AT ALL

## 2024-02-08 NOTE — DIETARY
"Nutrition services: Day 2 of admit.  Jesus Gorman is a 58 y.o. male with admitting DX of acute deep vein thrombosis to left lower extremity, unspecified vein.   Consult received for unplanned wt loss of 12-23 lbs in >1 year (MST 2).     RD able to visit pt at bedside. Pt reports UBW of 160 lbs. Reports weight has been trending down over the past few months. Per chart review pt weight is trending up over the past few years. No weight loss identified at this time. Pt asking about meal tray during assessment. Reports he hasn't had anything to eat since coming to the hospital. Expressed being very hungry. Prior to admission pt eating normally. RD informed the patient diet status is up to MD discretion. Per NFPE pt is well nourished.     Assessment:  Height: 172.7 cm (5' 8\")  Weight: 86.5 kg (190 lb 11.2 oz)  Body mass index is 29 kg/m²., BMI classification: Overweight  Diet/Intake: NPO for IR    Wt Readings from Last 5 Encounters:   02/06/24 86.5 kg (190 lb 11.2 oz)   09/08/21 79.4 kg (175 lb)   07/28/21 79.4 kg (175 lb)   07/03/21 79.6 kg (175 lb 7.8 oz)   07/26/13 77.2 kg (170 lb 1.6 oz)     Evaluation:   Hx of DVT, CABG, cardiac surgery, carotid thrombectomy. Transfer from [facility] for worsening left lower extremity DVT.   Labs: Glu 102, Bun 48, Creatinine 2.84, GFR 25   Meds reviewed.   Skin: 2+ edema in the LLE and 1+ edema in the RLE.   +BM PTA    Malnutrition Risk: No criteria identified at this time.     Recommendations/Plan:  Diet advancement per MD.    Encourage intake of >50% once diet assigned.   Document intake of all PO as % taken in ADL's to provide interdisciplinary communication across all shifts.   Monitor weight.  Nutrition rep will continue to see patient for ongoing meal and snack preferences.     RD following.     "

## 2024-02-08 NOTE — PROGRESS NOTES
Re-instructed patient on strict NPO post midnight. Watch out for any signs and symptoms of bleeding.

## 2024-02-08 NOTE — PROGRESS NOTES
HUDSON Gray (charge nurse) convinced the patient to have another IV access. New IV line at left FA G22 inserted ultrasound guided by HUDSON rGay, NS continued at 100ml/hr. And Unasyn IVPB given (see MAR).

## 2024-02-08 NOTE — CARE PLAN
The patient is Watcher - Medium risk of patient condition declining or worsening    Shift Goals  Clinical Goals: No sx/sy of bleeding  Patient Goals: sleep comfortably  Family Goals: none present    Progress made toward(s) clinical / shift goals: Ongoing weight-based Heparin drip 1350 units/hr. at 27 ml/hr, continuous monitoring of APTT after 6hrs. No sx/sy of bleeding noted.    Patient is not progressing towards the following goals: N/A

## 2024-02-08 NOTE — PROGRESS NOTES
APTT 54.5, verified with HUDSON Gray (charge nurse). Heparin 2600units (2.6ml) IVP bolus administered then increased Heparin drip to 1350units/hr. (27ml/hr).

## 2024-02-08 NOTE — PROGRESS NOTES
"Brief IR Note:    Pt reports LEFT thigh swelling feels much better, \"has gone down a bunch\", and is not tight anymore. LEFT thigh is red to pink in color, warm to touch, with popliteal pulses present. Darkened leathery skin with peeling epidermis and open ulcer to LEFT calf and foot. LEFT great toes has cap refill <3 sec.    2/6/24 - US lower extremity RIGHT shows an acute to subacute partially occlusive thrombus in the popliteal vein.  2/6/24 - US lower extremity LEFT shows acute to subacute, deep venous thrombosis in the common femoral, femoral, and popliteal veins with areas of complete occlusion and areas of partial    occlusion    Heparin infusion running.    Pending IVC filter placement with IR Dr. Schrader.  Consult vascular medicine for potential venous insufficiency.  "

## 2024-02-08 NOTE — PROGRESS NOTES
HUDSON Gray talked to the patient and convinced him to continue blood works. Notified phlebotomist.

## 2024-02-08 NOTE — PROGRESS NOTES
Hospital Medicine Daily Progress Note    Date of Service  2/8/2024    Chief Complaint  Jesus Gorman is a 58 y.o. male admitted 2/6/2024 with acute DVT    Hospital Course    58 y.o. male with past medical history of DVT which was provoked from surgery, CABG, cardiac surgery, carotid thrombectomy on Plavix who presented 2/6/2024 transfer from [facility] for worsening left lower extremity DVT.  Patient had arterial Doppler ultrasound which is negative for occlusion. Transferred here for lysis of DVT.  Left lower extremity ultrasound noted with extensive DVT of left lower extremity, Acute to subacute, deep venous thrombosis in the common femoral, femoral,  and popliteal veins with areas of complete occlusion and areas of partial occlusion, The proximal extent of the thrombus appears loosely attached..  Right lower extremity also noted with acute subacute to DVT.  This was discussed with IR Dr. Schrader ans  is planning for thrombolysis and IVC filter placement.  Urology was consulted for severe right-sided hydronephrosis and recommending continue Hightower and repeat ultrasound in 48 hours.         Interval Problem Update    2/8/2024  Patient seen and examined at bedside  Vitals remained stable  Recent labs reviewed normal white count, hemoglobin 10.1, renal function improving BUN/creatinine 48/2.84    Discussed with radiology nurse practitioner Grace.  Patient is scheduled for IVC filter placement        Started on Keflex  Continue IV fluid  Continue IV heparin drip for ongoing DVT  Will get bilateral arterial ultrasound with DOMINGO to rule out artery insufficiency.  Vascular surgery consult depending upon DOMINGO result    Repeat BMP in a.m. to monitor electrolytes and renal function  Repeat CBC in a.m. to monitor white count and hemoglobin while on IV heparin drip    Need close monitoring of blood counts, electrolytes and renal function due to potential of organ dysfunction.    Requiring close monitoring for toxicity while  on IV controlled substance  Scheduled for thrombolysis and IVC placement by IR    High risk of deterioration into acute PE.  Need close monitoring for oxygen requirement.  Given extensive clot burden patient is requiring IVC filter    Patient has a high medical complexity, complex decision making and is at high risk of complication, morbidity and mortality          I have discussed this patient's plan of care and discharge plan at IDT rounds today with Case Management, Nursing, Nursing leadership, and other members of the IDT team.    Consultants/Specialty  urology and IR     Code Status  Full Code    Disposition  The patient is not medically cleared for discharge to home or a post-acute facility.  Anticipate discharge to: home with close outpatient follow-up    I have placed the appropriate orders for post-discharge needs.    Review of Systems  Review of Systems   Respiratory:  Negative for cough and shortness of breath.    Musculoskeletal:  Positive for joint pain and myalgias.        Physical Exam  Temp:  [36.3 °C (97.4 °F)-36.7 °C (98 °F)] 36.3 °C (97.4 °F)  Pulse:  [63-79] 64  Resp:  [16-18] 16  BP: (108-127)/(65-82) 127/82  SpO2:  [98 %-99 %] 98 %    Physical Exam  Constitutional:       Appearance: He is ill-appearing.   Cardiovascular:      Rate and Rhythm: Normal rate and regular rhythm.      Pulses: Normal pulses.      Heart sounds: Normal heart sounds.   Pulmonary:      Effort: No respiratory distress.      Breath sounds: Normal breath sounds.   Genitourinary:     Comments: On Hightower  Musculoskeletal:      Right lower leg: No edema.      Left lower leg: Edema present.      Comments: Bilateral lower extremity edema.  Diffuse erythema.  Warm and tender to touch   Neurological:      General: No focal deficit present.      Mental Status: He is alert and oriented to person, place, and time.   Psychiatric:         Mood and Affect: Mood normal.         Fluids    Intake/Output Summary (Last 24 hours) at 2/8/2024  1357  Last data filed at 2/8/2024 1300  Gross per 24 hour   Intake 655.71 ml   Output 3800 ml   Net -3144.29 ml         Laboratory  Recent Labs     02/06/24  1358 02/07/24  1226 02/08/24  0453   WBC 11.4* 11.5* 9.2   RBC 2.97* 3.10* 3.22*   HEMOGLOBIN 9.2* 9.5* 10.1*   HEMATOCRIT 28.1* 29.9* 29.5*   MCV 94.6 96.5 91.6   MCH 31.0 30.6 31.4   MCHC 32.7 31.8* 34.2   RDW 44.4 46.3 42.7   PLATELETCT 290 328 324   MPV 9.4 9.3 9.7       Recent Labs     02/06/24  1358 02/07/24  1226 02/08/24  0453   SODIUM 137 142 141   POTASSIUM 4.5 5.1 4.7   CHLORIDE 105 112 108   CO2 20 21 21   GLUCOSE 106* 84 102*   BUN 61* 53* 48*   CREATININE 3.85* 3.26* 2.84*   CALCIUM 8.1* 7.9* 7.4*       Recent Labs     02/06/24  1358 02/07/24  1226 02/07/24 2009 02/08/24  0453 02/08/24  1108   APTT 40.8*   < > 54.2* 66.5* 89.4*   INR 1.64*  --   --  1.16*  --     < > = values in this interval not displayed.                 Imaging  US-EXTREMITY VENOUS LOWER UNILAT RIGHT   Final Result      CT-RENAL COLIC EVALUATION(A/P W/O)   Final Result      1.  Moderate to severe bilateral hydronephrosis.  No calcified ureteral stone.   2.  Bladder decompressed by Hightower catheter with multiple bladder stones present.   3.  Distended gallbladder.   4.  Diffusely increased colonic stool suggesting constipation.   5.  Limited by lack of IV contrast.   6.  Diffuse LEFT thigh subcutaneous edema and LEFT groin adenopathy.      US-RENAL   Final Result      1.  Severe right-sided hydronephrosis.      2.  Multiple left renal cysts with apparent moderate hydronephrosis.      3.  Distended urinary bladder.      US-EXTREMITY VENOUS LOWER UNILAT LEFT   Final Result      IR-INSERT IVC FILTER WITH IG & SI    (Results Pending)   US-EXTREMITY ARTERY LOWER BILAT W/DOMINGO (COMBO)    (Results Pending)        Assessment/Plan  * Acute deep vein thrombosis (DVT) of left lower extremity, unspecified vein (HCC)- (present on admission)  Assessment & Plan  Left lower extremity duplex noted  with acute/subacute DVT in common femoral, femoral and popliteal vein with area of complete occlusion and areas of partial occlusion.  DVT in  peroneal and posterior tibial vein.     Begin anticoagulation with heparin - I am titrating a heparin drip based upon serial anti-Xa determinations .  Requiring IV narcotics for pain management, monitor for respiratory toxicity  IR Dr. Nicole recommended to start on heparin drip and repeat ultrasound and he will decide tomorrow for DVT lysis depending upon his clinical improvement.      DVT, lower extremity, proximal, acute, bilateral (HCC)  Assessment & Plan  Left lower extremity ultrasound noted with extensive DVT of left lower extremity, Acute to subacute, deep venous thrombosis in the common femoral, femoral,  and popliteal veins with areas of complete occlusion and areas of partial occlusion, The proximal extent of the thrombus appears loosely attached..    Right lower extremity also noted with acute subacute to DVT.    This was discussed with IR Dr. Schrader ans  is planning for thrombolysis and IVC filter placement.    Continue IV heparin drip.  I Am titrating a heparin drip based upon serial anti-Xa determinations   High risk of deterioration into acute PE.  Need close monitoring for oxygen requirement.  2/8/2024  High risk of deterioration into acute PE.  Need close monitoring for oxygen requirement.  Given extensive clot burden patient is requiring IVC filter.  I am titrating heparin drip with serial anti-Xa monitoring    Acute renal failure (ARF) (HCC)  Assessment & Plan    Acute kidney injury, likely secondary to obstructive uropathy due to right-sided hydronephrosis  Intravenous fluids  Avoid nephrotoxins, monitor inputs and outputs  Hightower placed  Urology following  Repeat kidney function closely  Case discussed with surgery urology Dr. Gallagher .  Urology evaluated and plan for continue Hightower and repeat ultrasound 48 hours.  2/8/2024  Renal function  improving    Cellulitis of lower extremity  Assessment & Plan  Concern for bilateral lower extremity cellulitis  On IV antibiotics  2/8/2024  There is concern for peripheral vascular disease given extensive cellulitis  Will get bilateral arterial ultrasound with DOMINGO to rule out artery insufficiency.  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 In review of yesterday's note (2/7/2024), there are no changes except as documented above.      Patient is critically ill.   The patient continues to have: Extensive clot burden of bilateral lower extremity  The vital organ system that is affected is the: Extensive DVT  If untreated there is a high chance of deterioration into: Acute PE/cardiac arrest  And eventually death.   The critical care that I am providing today is: Reviewing labs, adjusting IV heparin drip, discussed with IR for IVC filter placement  The critical that has been undertaken is medically complex.   There has been no overlap in critical care time.   Critical Care Time not including procedures: 31 min

## 2024-02-08 NOTE — PROGRESS NOTES
Phlebotomist informed that the patient is refusing blood draw. Despite explaining the importance of blood works, the patient continues to refuse. Notified HUDSON Grya (charge nurse).

## 2024-02-08 NOTE — OR SURGEON
Immediate Post- Operative Note        Findings: DVT and PE despite anticoagulation.       Procedure(s): IVC gram. IVC filter placement.       Estimated Blood Loss: Less than 5 ml        Complications: None            2/8/2024     1424 PM     Raz Schrader M.D.

## 2024-02-08 NOTE — PROGRESS NOTES
IR RN note    Patient underwent a IVC filter placement by Dr. Schrader.  Procedure site was verified by MD using imaging guidance. Consent was signed.  IR tom Berkowitz and Claudio assisted. Patient was placed in a supine position.  Vitals were taken every 5 minutes and remained stable during procedure (see doc flow sheet for results), see chart.  Right groin  access site.   A gauze and tegaderm  dressing was placed over surgical site. Report called to Susanna TREVIÑO. Pt transported by gurjudy with RN to room, with monitor .    No sedation given    MD procedure ended at 1421      Option Elite  Argon IVC filter   REF # [id number]  LOT # [id number]  Exp. 11-

## 2024-02-08 NOTE — PROGRESS NOTES
Patient returned to [facility] from IR. Procedure placed on hold for now. Continues on heparin infusion.

## 2024-02-08 NOTE — ASSESSMENT & PLAN NOTE
Acute kidney injury, likely secondary to obstructive uropathy due to right-sided hydronephrosis  Intravenous fluids  Avoid nephrotoxins, monitor inputs and outputs  Hightower placed  Urology following  Repeat kidney function closely  Repeat CT abdomen pelvis on 2/10 noted with moderate to severe bilateral hydronephrosis, possible obstructing stone in the right UVJ.  Scheduled for cystoscopy with stent placement tomorrow  Follow labs closely

## 2024-02-08 NOTE — PROGRESS NOTES
Pt presents to IR 1. Patient was consented by MD at bedside, confirmed by this RN and consent at bedside. Pt transferred to IR table in prone position.     This procedure was cancelled by Dr Schrader, prior to any intervention took place.  Phoned report to Raz RN with update of this.  Informed that patient can be back on previous diet but to be placed NPO at midnight again for potential of this procedure tomorrow 2/8.    Patient transported back to room with this RN.

## 2024-02-08 NOTE — PROGRESS NOTES
Nelida from lab. is asking if they he can draw APTT and Heparin XA altogether at 03:25am. Notified SARAH Frazier and advised this nurse that they can draw altogether at 03:25am.

## 2024-02-08 NOTE — PROGRESS NOTES
Hospital Medicine Daily Progress Note    Date of Service  2/7/2024    Chief Complaint  Jesus Gorman is a 58 y.o. male admitted 2/6/2024 with acute DVT    Hospital Course    58 y.o. male with past medical history of DVT which was provoked from surgery, CABG, cardiac surgery, carotid thrombectomy on Plavix who presented 2/6/2024 transfer from [facility] for worsening left lower extremity DVT.  Patient had arterial Doppler ultrasound which is negative for occlusion. Transferred here for lysis of DVT.  Left lower extremity ultrasound noted with extensive DVT of left lower extremity, Acute to subacute, deep venous thrombosis in the common femoral, femoral,  and popliteal veins with areas of complete occlusion and areas of partial occlusion, The proximal extent of the thrombus appears loosely attached..  Right lower extremity also noted with acute subacute to DVT.  This was discussed with IR Dr. Schrader ans  is planning for thrombolysis and IVC filter placement.  Urology was consulted for severe right-sided hydronephrosis and recommending continue Hightower and repeat ultrasound in 48 hours.         Interval Problem Update    2/7/2024  Patient seen and examined at bedside  Vital remained stable  Labs reviewed noted leukocytosis white count 11.5, renal function slightly improved with Hightower, BUN/creatinine 43/3.26  Chart reviewed, bilateral lower extremity venous ultrasound result reviewed.    Discussed with IR Dr. Schrader.  IR planning for thrombolysis and IVC filter placement    Case discussed with surgery urology Dr. Gallagher .  Urology evaluated and plan for continue Hightower and repeat ultrasound 48 hours.    Switch IV Unasyn and eventually will switch to Keflex  Continue IV fluid  Continue IV heparin drip for ongoing DVT  Repeat BMP in a.m. to monitor electrolytes and renal function  Repeat CBC in a.m. to monitor white count and hemoglobin while on IV heparin drip    Need close monitoring of blood counts, electrolytes and  renal function due to potential of organ dysfunction.    Requiring close monitoring for toxicity while on IV controlled substance  Requiring IV antibiotics, need close monitoring for toxicity  Scheduled for thrombolysis and IVC placement by IR    High risk of deterioration into acute PE.  Need close monitoring for oxygen requirement.  Patient has a high medical complexity, complex decision making and is at high risk of complication, morbidity and mortality            I have discussed this patient's plan of care and discharge plan at IDT rounds today with Case Management, Nursing, Nursing leadership, and other members of the IDT team.    Consultants/Specialty  urology and IR     Code Status  Full Code    Disposition  The patient is not medically cleared for discharge to home or a post-acute facility.  Anticipate discharge to: home with close outpatient follow-up    I have placed the appropriate orders for post-discharge needs.    Review of Systems  Review of Systems   Respiratory:  Negative for cough and shortness of breath.    Musculoskeletal:  Positive for joint pain and myalgias.        Physical Exam  Temp:  [35.9 °C (96.6 °F)-36.7 °C (98 °F)] 35.9 °C (96.6 °F)  Pulse:  [80-86] 80  Resp:  [16-18] 16  BP: ()/(50-65) 105/65  SpO2:  [96 %-97 %] 97 %    Physical Exam  Constitutional:       Appearance: He is ill-appearing.   Cardiovascular:      Rate and Rhythm: Normal rate and regular rhythm.      Pulses: Normal pulses.      Heart sounds: Normal heart sounds.   Pulmonary:      Effort: No respiratory distress.      Breath sounds: Normal breath sounds.   Genitourinary:     Comments: On Hightower  Musculoskeletal:      Right lower leg: No edema.      Left lower leg: Edema present.      Comments: Bilateral lower extremity edema.  Diffuse erythema.  Warm and tender to touch   Neurological:      General: No focal deficit present.      Mental Status: He is alert and oriented to person, place, and time.   Psychiatric:          Mood and Affect: Mood normal.         Fluids    Intake/Output Summary (Last 24 hours) at 2/7/2024 1726  Last data filed at 2/7/2024 1000  Gross per 24 hour   Intake 1297.77 ml   Output 5250 ml   Net -3952.23 ml       Laboratory  Recent Labs     02/06/24  1358 02/07/24  1226   WBC 11.4* 11.5*   RBC 2.97* 3.10*   HEMOGLOBIN 9.2* 9.5*   HEMATOCRIT 28.1* 29.9*   MCV 94.6 96.5   MCH 31.0 30.6   MCHC 32.7 31.8*   RDW 44.4 46.3   PLATELETCT 290 328   MPV 9.4 9.3     Recent Labs     02/06/24  1358 02/07/24  1226   SODIUM 137 142   POTASSIUM 4.5 5.1   CHLORIDE 105 112   CO2 20 21   GLUCOSE 106* 84   BUN 61* 53*   CREATININE 3.85* 3.26*   CALCIUM 8.1* 7.9*     Recent Labs     02/06/24  1358 02/07/24  1226   APTT 40.8* 44.9*   INR 1.64*  --                Imaging  US-EXTREMITY VENOUS LOWER UNILAT RIGHT   Final Result      CT-RENAL COLIC EVALUATION(A/P W/O)   Final Result      1.  Moderate to severe bilateral hydronephrosis.  No calcified ureteral stone.   2.  Bladder decompressed by Hightower catheter with multiple bladder stones present.   3.  Distended gallbladder.   4.  Diffusely increased colonic stool suggesting constipation.   5.  Limited by lack of IV contrast.   6.  Diffuse LEFT thigh subcutaneous edema and LEFT groin adenopathy.      US-RENAL   Final Result      1.  Severe right-sided hydronephrosis.      2.  Multiple left renal cysts with apparent moderate hydronephrosis.      3.  Distended urinary bladder.      US-EXTREMITY VENOUS LOWER UNILAT LEFT   Final Result      IR-INSERT IVC FILTER WITH IG & SI    (Results Pending)   IR-EXTREMITY ANGIOGRAM-UNILATERAL LEFT    (Results Pending)   IR-US GUIDED PIV    (Results Pending)        Assessment/Plan  * Acute deep vein thrombosis (DVT) of left lower extremity, unspecified vein (HCC)- (present on admission)  Assessment & Plan  Left lower extremity duplex noted with acute/subacute DVT in common femoral, femoral and popliteal vein with area of complete occlusion and areas of  partial occlusion.  DVT in  peroneal and posterior tibial vein.     Begin anticoagulation with heparin - I am titrating a heparin drip based upon serial anti-Xa determinations .  Requiring IV narcotics for pain management, monitor for respiratory toxicity  IR Dr. Nicole recommended to start on heparin drip and repeat ultrasound and he will decide tomorrow for DVT lysis depending upon his clinical improvement.      DVT, lower extremity, proximal, acute, bilateral (HCC)  Assessment & Plan  Left lower extremity ultrasound noted with extensive DVT of left lower extremity, Acute to subacute, deep venous thrombosis in the common femoral, femoral,  and popliteal veins with areas of complete occlusion and areas of partial occlusion, The proximal extent of the thrombus appears loosely attached..    Right lower extremity also noted with acute subacute to DVT.    This was discussed with IR Dr. Schrader ans  is planning for thrombolysis and IVC filter placement.    Continue IV heparin drip.  I Am titrating a heparin drip based upon serial anti-Xa determinations   High risk of deterioration into acute PE.  Need close monitoring for oxygen requirement.    Acute renal failure (ARF) ([facility])  Assessment & Plan    Acute kidney injury, likely secondary to obstructive uropathy due to right-sided hydronephrosis  Intravenous fluids  Avoid nephrotoxins, monitor inputs and outputs  Hightower placed  Urology following  Repeat kidney function closely    Case discussed with surgery urology Dr. Gallagher .  Urology evaluated and plan for continue Hightower and repeat ultrasound 48 hours.    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 In review of yesterday's note (2/6/2024), there are no changes except as documented above.

## 2024-02-09 ENCOUNTER — APPOINTMENT (OUTPATIENT)
Dept: RADIOLOGY | Facility: MEDICAL CENTER | Age: 59
DRG: 300 | End: 2024-02-09
Payer: COMMERCIAL

## 2024-02-09 ENCOUNTER — APPOINTMENT (OUTPATIENT)
Dept: RADIOLOGY | Facility: MEDICAL CENTER | Age: 59
DRG: 300 | End: 2024-02-09
Attending: STUDENT IN AN ORGANIZED HEALTH CARE EDUCATION/TRAINING PROGRAM
Payer: COMMERCIAL

## 2024-02-09 PROBLEM — N13.30 HYDRONEPHROSIS: Status: ACTIVE | Noted: 2024-02-09

## 2024-02-09 LAB
APTT PPP: 74.1 SEC (ref 24.7–36)
BASOPHILS # BLD AUTO: 0.8 % (ref 0–1.8)
BASOPHILS # BLD: 0.08 K/UL (ref 0–0.12)
EOSINOPHIL # BLD AUTO: 0.2 K/UL (ref 0–0.51)
EOSINOPHIL NFR BLD: 2 % (ref 0–6.9)
ERYTHROCYTE [DISTWIDTH] IN BLOOD BY AUTOMATED COUNT: 44.8 FL (ref 35.9–50)
HCT VFR BLD AUTO: 31.4 % (ref 42–52)
HGB BLD-MCNC: 10.2 G/DL (ref 14–18)
IMM GRANULOCYTES # BLD AUTO: 0.13 K/UL (ref 0–0.11)
IMM GRANULOCYTES NFR BLD AUTO: 1.3 % (ref 0–0.9)
LYMPHOCYTES # BLD AUTO: 1.22 K/UL (ref 1–4.8)
LYMPHOCYTES NFR BLD: 12.2 % (ref 22–41)
MCH RBC QN AUTO: 30.9 PG (ref 27–33)
MCHC RBC AUTO-ENTMCNC: 32.5 G/DL (ref 32.3–36.5)
MCV RBC AUTO: 95.2 FL (ref 81.4–97.8)
MONOCYTES # BLD AUTO: 0.46 K/UL (ref 0–0.85)
MONOCYTES NFR BLD AUTO: 4.6 % (ref 0–13.4)
NEUTROPHILS # BLD AUTO: 7.87 K/UL (ref 1.82–7.42)
NEUTROPHILS NFR BLD: 79.1 % (ref 44–72)
NRBC # BLD AUTO: 0 K/UL
NRBC BLD-RTO: 0 /100 WBC (ref 0–0.2)
PLATELET # BLD AUTO: 331 K/UL (ref 164–446)
PMV BLD AUTO: 9.6 FL (ref 9–12.9)
RBC # BLD AUTO: 3.3 M/UL (ref 4.7–6.1)
UFH PPP CHRO-ACNC: 0.66 IU/ML
WBC # BLD AUTO: 10 K/UL (ref 4.8–10.8)

## 2024-02-09 PROCEDURE — 700102 HCHG RX REV CODE 250 W/ 637 OVERRIDE(OP)

## 2024-02-09 PROCEDURE — 85520 HEPARIN ASSAY: CPT

## 2024-02-09 PROCEDURE — 770006 HCHG ROOM/CARE - MED/SURG/GYN SEMI*

## 2024-02-09 PROCEDURE — 74176 CT ABD & PELVIS W/O CONTRAST: CPT

## 2024-02-09 PROCEDURE — 93925 LOWER EXTREMITY STUDY: CPT

## 2024-02-09 PROCEDURE — A9270 NON-COVERED ITEM OR SERVICE: HCPCS | Performed by: STUDENT IN AN ORGANIZED HEALTH CARE EDUCATION/TRAINING PROGRAM

## 2024-02-09 PROCEDURE — 85025 COMPLETE CBC W/AUTO DIFF WBC: CPT

## 2024-02-09 PROCEDURE — 99233 SBSQ HOSP IP/OBS HIGH 50: CPT | Performed by: STUDENT IN AN ORGANIZED HEALTH CARE EDUCATION/TRAINING PROGRAM

## 2024-02-09 PROCEDURE — 85730 THROMBOPLASTIN TIME PARTIAL: CPT

## 2024-02-09 PROCEDURE — 93925 LOWER EXTREMITY STUDY: CPT | Mod: 26 | Performed by: INTERNAL MEDICINE

## 2024-02-09 PROCEDURE — 700102 HCHG RX REV CODE 250 W/ 637 OVERRIDE(OP): Performed by: STUDENT IN AN ORGANIZED HEALTH CARE EDUCATION/TRAINING PROGRAM

## 2024-02-09 PROCEDURE — A9270 NON-COVERED ITEM OR SERVICE: HCPCS

## 2024-02-09 PROCEDURE — 700111 HCHG RX REV CODE 636 W/ 250 OVERRIDE (IP): Mod: JZ | Performed by: STUDENT IN AN ORGANIZED HEALTH CARE EDUCATION/TRAINING PROGRAM

## 2024-02-09 PROCEDURE — 36415 COLL VENOUS BLD VENIPUNCTURE: CPT

## 2024-02-09 PROCEDURE — 76775 US EXAM ABDO BACK WALL LIM: CPT

## 2024-02-09 PROCEDURE — 700105 HCHG RX REV CODE 258: Performed by: STUDENT IN AN ORGANIZED HEALTH CARE EDUCATION/TRAINING PROGRAM

## 2024-02-09 RX ORDER — CALCIUM CARBONATE 500 MG/1
500 TABLET, CHEWABLE ORAL DAILY
Status: DISCONTINUED | OUTPATIENT
Start: 2024-02-10 | End: 2024-02-12 | Stop reason: HOSPADM

## 2024-02-09 RX ORDER — ENOXAPARIN SODIUM 100 MG/ML
1 INJECTION SUBCUTANEOUS EVERY 12 HOURS
Status: DISCONTINUED | OUTPATIENT
Start: 2024-02-09 | End: 2024-02-12 | Stop reason: HOSPADM

## 2024-02-09 RX ORDER — CEPHALEXIN 500 MG/1
500 CAPSULE ORAL EVERY 8 HOURS
Status: COMPLETED | OUTPATIENT
Start: 2024-02-09 | End: 2024-02-11

## 2024-02-09 RX ORDER — ONDANSETRON 2 MG/ML
4 INJECTION INTRAMUSCULAR; INTRAVENOUS EVERY 4 HOURS PRN
Status: DISCONTINUED | OUTPATIENT
Start: 2024-02-09 | End: 2024-02-12 | Stop reason: HOSPADM

## 2024-02-09 RX ORDER — OMEPRAZOLE 20 MG/1
20 CAPSULE, DELAYED RELEASE ORAL DAILY
Status: DISCONTINUED | OUTPATIENT
Start: 2024-02-10 | End: 2024-02-12 | Stop reason: HOSPADM

## 2024-02-09 RX ORDER — ALUMINA, MAGNESIA, AND SIMETHICONE 2400; 2400; 240 MG/30ML; MG/30ML; MG/30ML
10 SUSPENSION ORAL 4 TIMES DAILY PRN
Status: DISCONTINUED | OUTPATIENT
Start: 2024-02-09 | End: 2024-02-12 | Stop reason: HOSPADM

## 2024-02-09 RX ADMIN — ACETAMINOPHEN 1000 MG: 500 TABLET ORAL at 12:45

## 2024-02-09 RX ADMIN — OXYCODONE 5 MG: 5 TABLET ORAL at 03:41

## 2024-02-09 RX ADMIN — ENOXAPARIN SODIUM 80 MG: 100 INJECTION SUBCUTANEOUS at 16:22

## 2024-02-09 RX ADMIN — AMPICILLIN AND SULBACTAM 3 G: 1; 2 INJECTION, POWDER, FOR SOLUTION INTRAMUSCULAR; INTRAVENOUS at 05:15

## 2024-02-09 RX ADMIN — ACETAMINOPHEN 1000 MG: 500 TABLET ORAL at 00:43

## 2024-02-09 RX ADMIN — AMPICILLIN AND SULBACTAM 3 G: 1; 2 INJECTION, POWDER, FOR SOLUTION INTRAMUSCULAR; INTRAVENOUS at 00:44

## 2024-02-09 RX ADMIN — ACETAMINOPHEN 1000 MG: 500 TABLET ORAL at 17:27

## 2024-02-09 RX ADMIN — ACETAMINOPHEN 1000 MG: 500 TABLET ORAL at 05:12

## 2024-02-09 RX ADMIN — AMPICILLIN AND SULBACTAM 3 G: 1; 2 INJECTION, POWDER, FOR SOLUTION INTRAMUSCULAR; INTRAVENOUS at 12:45

## 2024-02-09 RX ADMIN — OXYCODONE 5 MG: 5 TABLET ORAL at 20:36

## 2024-02-09 RX ADMIN — ONDANSETRON 4 MG: 2 INJECTION INTRAMUSCULAR; INTRAVENOUS at 18:18

## 2024-02-09 RX ADMIN — ALUMINUM HYDROXIDE, MAGNESIUM HYDROXIDE, AND DIMETHICONE 10 ML: 400; 400; 40 SUSPENSION ORAL at 21:41

## 2024-02-09 RX ADMIN — CEPHALEXIN 500 MG: 500 CAPSULE ORAL at 17:27

## 2024-02-09 RX ADMIN — CLOPIDOGREL BISULFATE 75 MG: 75 TABLET ORAL at 05:12

## 2024-02-09 RX ADMIN — SODIUM CHLORIDE: 9 INJECTION, SOLUTION INTRAVENOUS at 00:41

## 2024-02-09 ASSESSMENT — ENCOUNTER SYMPTOMS
FOCAL WEAKNESS: 0
SHORTNESS OF BREATH: 0
ABDOMINAL PAIN: 0
NAUSEA: 0
HEADACHES: 0
HEARTBURN: 1
COUGH: 0
CHILLS: 0
FEVER: 0
VOMITING: 0
MYALGIAS: 1
CONSTIPATION: 0

## 2024-02-09 ASSESSMENT — PAIN DESCRIPTION - PAIN TYPE
TYPE: ACUTE PAIN

## 2024-02-09 ASSESSMENT — LIFESTYLE VARIABLES: SUBSTANCE_ABUSE: 1

## 2024-02-09 NOTE — ASSESSMENT & PLAN NOTE
Renal ultrasound result reviewed, severe right hydronephrosis appears somewhat decreased on 2/9/2024  Continue Hightower  Urology following  Repeat CT abdomen pelvis on 2/10 noted with moderate to severe bilateral hydronephrosis, possible obstructing stone in the right UVJ.  Scheduled for cystoscopy with stent placement

## 2024-02-09 NOTE — PROGRESS NOTES
Hospital Medicine Daily Progress Note    Date of Service  2/9/2024    Chief Complaint  Jesus Gorman is a 58 y.o. male admitted 2/6/2024 with acute DVT    Hospital Course    58 y.o. male with past medical history of DVT which was provoked from surgery, CABG, cardiac surgery, carotid thrombectomy on Plavix who presented 2/6/2024 transfer from [facility] for worsening left lower extremity DVT.  Patient had arterial Doppler ultrasound which is negative for occlusion. Transferred here for lysis of DVT.  Left lower extremity ultrasound noted with extensive DVT of left lower extremity, Acute to subacute, deep venous thrombosis in the common femoral, femoral,  and popliteal veins with areas of complete occlusion and areas of partial occlusion, The proximal extent of the thrombus appears loosely attached..  Right lower extremity also noted with acute subacute to DVT.  This was discussed with IR Dr. Schrader ans  is planning for thrombolysis and IVC filter placement.  Urology was consulted for severe right-sided hydronephrosis and recommending continue Hightower and repeat ultrasound in 48 hours. S/p IVC filter placement by Dr. Schrader on 2/8         Interval Problem Update      2/9/2024  Vitals remained stable  On room air saturating 90%  Lower extremity swelling improved  He denies any discomfort    Labs reviewed normal white count, hemoglobin 10.2, renal function improving    Procedure reviewed, status post IVC filter placement    Renal ultrasound result reviewed, severe right hydronephrosis appears somewhat decreased        Started on Keflex  Continue IV fluid  Change IV heparin drip to Lovenox  Will get bilateral arterial ultrasound result pending  Vascular surgery consult depending upon DOMINGO result  Repeat BMP in a.m. to monitor electrolytes and renal function  Repeat CBC in a.m. to monitor white count and hemoglobin while on therapeutic Lovenox  Urology following      Need close monitoring of blood counts, electrolytes  and renal function due to ongoing acute renal failure.  Need close monitoring while on therapeutic Lovenox    Requiring close monitoring for toxicity while on IV controlled substance  Patient has a high medical complexity, complex decision making and is at high risk of complication, morbidity and mortality        I have discussed this patient's plan of care and discharge plan at IDT rounds today with Case Management, Nursing, Nursing leadership, and other members of the IDT team.    Consultants/Specialty  urology and IR     Code Status  Full Code    Disposition  The patient is not medically cleared for discharge to home or a post-acute facility.  Anticipate discharge to: home with close outpatient follow-up    I have placed the appropriate orders for post-discharge needs.    Review of Systems  Review of Systems   Respiratory:  Negative for cough and shortness of breath.    Musculoskeletal:  Positive for joint pain and myalgias.        Physical Exam  Temp:  [36.6 °C (97.8 °F)-36.8 °C (98.2 °F)] 36.7 °C (98.1 °F)  Pulse:  [62-77] 70  Resp:  [17-19] 18  BP: (128-137)/(85-90) 137/88  SpO2:  [96 %-98 %] 98 %    Physical Exam  Constitutional:       Appearance: He is ill-appearing.   Cardiovascular:      Rate and Rhythm: Normal rate and regular rhythm.      Pulses: Normal pulses.      Heart sounds: Normal heart sounds.   Pulmonary:      Effort: No respiratory distress.      Breath sounds: Normal breath sounds.   Genitourinary:     Comments: On Hightower  Musculoskeletal:      Right lower leg: No edema.      Left lower leg: Edema present.      Comments: Bilateral lower extremity edema.  Diffuse erythema.  Warm and tender to touch   Neurological:      General: No focal deficit present.      Mental Status: He is alert and oriented to person, place, and time.   Psychiatric:         Mood and Affect: Mood normal.         Fluids    Intake/Output Summary (Last 24 hours) at 2/9/2024 1432  Last data filed at 2/9/2024 1226  Gross per 24  hour   Intake 1309.8 ml   Output 6900 ml   Net -5590.2 ml         Laboratory  Recent Labs     02/07/24  1226 02/08/24  0453 02/09/24  0941   WBC 11.5* 9.2 10.0   RBC 3.10* 3.22* 3.30*   HEMOGLOBIN 9.5* 10.1* 10.2*   HEMATOCRIT 29.9* 29.5* 31.4*   MCV 96.5 91.6 95.2   MCH 30.6 31.4 30.9   MCHC 31.8* 34.2 32.5   RDW 46.3 42.7 44.8   PLATELETCT 328 324 331   MPV 9.3 9.7 9.6       Recent Labs     02/07/24  1226 02/08/24  0453   SODIUM 142 141   POTASSIUM 5.1 4.7   CHLORIDE 112 108   CO2 21 21   GLUCOSE 84 102*   BUN 53* 48*   CREATININE 3.26* 2.84*   CALCIUM 7.9* 7.4*       Recent Labs     02/08/24  0453 02/08/24  1108 02/09/24  0941   APTT 66.5* 89.4* 74.1*   INR 1.16*  --   --                  Imaging  US-EXTREMITY ARTERY LOWER BILAT         US-RENAL   Final Result         1.  Severe right hydronephrosis appears somewhat decreased since prior study.      IR-INSERT IVC FILTER WITH IG & SI   Final Result      1. Ultrasound and fluoroscopic guided placement of an Option Elite infrarenal inferior vena cava filter.      2. Inferior vena cavagram within normal limits with no evidence of caval thrombus or occlusion.      US-EXTREMITY VENOUS LOWER UNILAT RIGHT   Final Result      CT-RENAL COLIC EVALUATION(A/P W/O)   Final Result      1.  Moderate to severe bilateral hydronephrosis.  No calcified ureteral stone.   2.  Bladder decompressed by Hightower catheter with multiple bladder stones present.   3.  Distended gallbladder.   4.  Diffusely increased colonic stool suggesting constipation.   5.  Limited by lack of IV contrast.   6.  Diffuse LEFT thigh subcutaneous edema and LEFT groin adenopathy.      US-RENAL   Final Result      1.  Severe right-sided hydronephrosis.      2.  Multiple left renal cysts with apparent moderate hydronephrosis.      3.  Distended urinary bladder.      US-EXTREMITY VENOUS LOWER UNILAT LEFT   Final Result           Assessment/Plan  * Acute deep vein thrombosis (DVT) of left lower extremity, unspecified  vein (HCC)- (present on admission)  Assessment & Plan  Left lower extremity duplex noted with acute/subacute DVT in common femoral, femoral and popliteal vein with area of complete occlusion and areas of partial occlusion.  DVT in  peroneal and posterior tibial vein.     S/p   IVC filter placement by Dr. Schrader on 2/8    Hydronephrosis  Assessment & Plan  Renal ultrasound result reviewed, severe right hydronephrosis appears somewhat decreased on 2/9/2024  Continue Hightower  Urology following    DVT, lower extremity, proximal, acute, bilateral ([facility])  Assessment & Plan  Left lower extremity ultrasound noted with extensive DVT of left lower extremity, Acute to subacute, deep venous thrombosis in the common femoral, femoral,  and popliteal veins with areas of complete occlusion and areas of partial occlusion, The proximal extent of the thrombus appears loosely attached..    Right lower extremity also noted with acute subacute to DVT.    This was discussed with IR Dr. Schrader ans  is planning for thrombolysis and IVC filter placement.    Continue IV heparin drip.  I Am titrating a heparin drip based upon serial anti-Xa determinations   High risk of deterioration into acute PE.  Need close monitoring for oxygen requirement.  2/8/2024  High risk of deterioration into acute PE.  Need close monitoring for oxygen requirement.  Given extensive clot burden patient is requiring IVC filter.  I am titrating heparin drip with serial anti-Xa monitoring    Acute renal failure (ARF) ([facility])  Assessment & Plan    Acute kidney injury, likely secondary to obstructive uropathy due to right-sided hydronephrosis  Intravenous fluids  Avoid nephrotoxins, monitor inputs and outputs  Hightower placed  Urology following  Repeat kidney function closely  Case discussed with surgery urology Dr. Gallagher .  Urology evaluated and plan for continue Hightower and repeat ultrasound 48 hours.  Renal function improving    2/9/2024  Renal ultrasound result reviewed, severe  right hydronephrosis appears somewhat decreased    Cellulitis of lower extremity  Assessment & Plan  Concern for bilateral lower extremity cellulitis  On IV antibiotics  2/8/2024  There is concern for peripheral vascular disease given extensive cellulitis  Will get bilateral arterial ultrasound with DOMINGO to rule out artery insufficiency.  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 In review of yesterday's note (2/8/2024), there are no changes except as documented above.

## 2024-02-09 NOTE — PROGRESS NOTES
Called Pharmacy, spoke with Myrna and verified Heparin drip rate from Heparin XA 0.85. Myrna advised this nurse on 15.6 units/kg/hr from (1350 (current dose per APTT) / 86.5 (patient's weight).

## 2024-02-09 NOTE — CARE PLAN
The patient is Watcher - Medium risk of patient condition declining or worsening    Shift Goals  Clinical Goals: Monitor for sx/sy of bleeding while on Heparin drip.  Patient Goals: rest and eat  Family Goals: none present    Progress made toward(s) clinical / shift goals: Patient on continuous Heparin drip, no sx/sy of bleeding noted, /85. Will continue to monitor.    Patient is not progressing towards the following goals: N/A

## 2024-02-09 NOTE — CARE PLAN
The patient is Stable - Low risk of patient condition declining or worsening    Shift Goals  Clinical Goals: Monitor for bleeding, labs, Heparin drip  Patient Goals: sleep  Family Goals: none present    Progress made toward(s) clinical / shift goals:      Patient is not progressing towards the following goals:    Pt is A&Ox4, VSS. No c/o pain voiced at this time. Continue IV heparin drip for ongoing DVT. IVC filter inserted, incision site dng CDI, no drainage noted. Anti Xa drawn. Pt updated on POC, verbalized understanding. Safety measures in place.

## 2024-02-10 PROBLEM — E83.42 HYPOMAGNESEMIA: Status: ACTIVE | Noted: 2024-02-10

## 2024-02-10 LAB
ANION GAP SERPL CALC-SCNC: 11 MMOL/L (ref 7–16)
BASOPHILS # BLD AUTO: 1 % (ref 0–1.8)
BASOPHILS # BLD: 0.09 K/UL (ref 0–0.12)
BUN SERPL-MCNC: 28 MG/DL (ref 8–22)
CALCIUM SERPL-MCNC: 8.5 MG/DL (ref 8.5–10.5)
CHLORIDE SERPL-SCNC: 101 MMOL/L (ref 96–112)
CO2 SERPL-SCNC: 21 MMOL/L (ref 20–33)
CREAT SERPL-MCNC: 2.24 MG/DL (ref 0.5–1.4)
EOSINOPHIL # BLD AUTO: 0.19 K/UL (ref 0–0.51)
EOSINOPHIL NFR BLD: 2 % (ref 0–6.9)
ERYTHROCYTE [DISTWIDTH] IN BLOOD BY AUTOMATED COUNT: 44.5 FL (ref 35.9–50)
GFR SERPLBLD CREATININE-BSD FMLA CKD-EPI: 33 ML/MIN/1.73 M 2
GLUCOSE SERPL-MCNC: 125 MG/DL (ref 65–99)
HCT VFR BLD AUTO: 34.5 % (ref 42–52)
HGB BLD-MCNC: 11.3 G/DL (ref 14–18)
IMM GRANULOCYTES # BLD AUTO: 0.07 K/UL (ref 0–0.11)
IMM GRANULOCYTES NFR BLD AUTO: 0.7 % (ref 0–0.9)
LYMPHOCYTES # BLD AUTO: 1.07 K/UL (ref 1–4.8)
LYMPHOCYTES NFR BLD: 11.4 % (ref 22–41)
MCH RBC QN AUTO: 30.5 PG (ref 27–33)
MCHC RBC AUTO-ENTMCNC: 32.8 G/DL (ref 32.3–36.5)
MCV RBC AUTO: 93 FL (ref 81.4–97.8)
MONOCYTES # BLD AUTO: 0.41 K/UL (ref 0–0.85)
MONOCYTES NFR BLD AUTO: 4.4 % (ref 0–13.4)
NEUTROPHILS # BLD AUTO: 7.58 K/UL (ref 1.82–7.42)
NEUTROPHILS NFR BLD: 80.5 % (ref 44–72)
NRBC # BLD AUTO: 0 K/UL
NRBC BLD-RTO: 0 /100 WBC (ref 0–0.2)
PLATELET # BLD AUTO: 375 K/UL (ref 164–446)
PMV BLD AUTO: 9.2 FL (ref 9–12.9)
POTASSIUM SERPL-SCNC: 5 MMOL/L (ref 3.6–5.5)
RBC # BLD AUTO: 3.71 M/UL (ref 4.7–6.1)
SODIUM SERPL-SCNC: 133 MMOL/L (ref 135–145)
WBC # BLD AUTO: 9.4 K/UL (ref 4.8–10.8)

## 2024-02-10 PROCEDURE — A9270 NON-COVERED ITEM OR SERVICE: HCPCS

## 2024-02-10 PROCEDURE — 770006 HCHG ROOM/CARE - MED/SURG/GYN SEMI*

## 2024-02-10 PROCEDURE — 99233 SBSQ HOSP IP/OBS HIGH 50: CPT | Performed by: STUDENT IN AN ORGANIZED HEALTH CARE EDUCATION/TRAINING PROGRAM

## 2024-02-10 PROCEDURE — 700102 HCHG RX REV CODE 250 W/ 637 OVERRIDE(OP)

## 2024-02-10 PROCEDURE — 97602 WOUND(S) CARE NON-SELECTIVE: CPT

## 2024-02-10 PROCEDURE — A9270 NON-COVERED ITEM OR SERVICE: HCPCS | Performed by: STUDENT IN AN ORGANIZED HEALTH CARE EDUCATION/TRAINING PROGRAM

## 2024-02-10 PROCEDURE — 700111 HCHG RX REV CODE 636 W/ 250 OVERRIDE (IP): Performed by: STUDENT IN AN ORGANIZED HEALTH CARE EDUCATION/TRAINING PROGRAM

## 2024-02-10 PROCEDURE — 85025 COMPLETE CBC W/AUTO DIFF WBC: CPT

## 2024-02-10 PROCEDURE — 700111 HCHG RX REV CODE 636 W/ 250 OVERRIDE (IP)

## 2024-02-10 PROCEDURE — 36415 COLL VENOUS BLD VENIPUNCTURE: CPT

## 2024-02-10 PROCEDURE — 80048 BASIC METABOLIC PNL TOTAL CA: CPT

## 2024-02-10 PROCEDURE — 700102 HCHG RX REV CODE 250 W/ 637 OVERRIDE(OP): Performed by: STUDENT IN AN ORGANIZED HEALTH CARE EDUCATION/TRAINING PROGRAM

## 2024-02-10 RX ORDER — POLYETHYLENE GLYCOL 3350 17 G/17G
1 POWDER, FOR SOLUTION ORAL
Status: DISCONTINUED | OUTPATIENT
Start: 2024-02-10 | End: 2024-02-12 | Stop reason: HOSPADM

## 2024-02-10 RX ORDER — HYDROMORPHONE HYDROCHLORIDE 1 MG/ML
0.5 INJECTION, SOLUTION INTRAMUSCULAR; INTRAVENOUS; SUBCUTANEOUS ONCE
Status: COMPLETED | OUTPATIENT
Start: 2024-02-10 | End: 2024-02-10

## 2024-02-10 RX ORDER — MAGNESIUM SULFATE HEPTAHYDRATE 40 MG/ML
4 INJECTION, SOLUTION INTRAVENOUS ONCE
Status: COMPLETED | OUTPATIENT
Start: 2024-02-10 | End: 2024-02-10

## 2024-02-10 RX ORDER — AMOXICILLIN 250 MG
2 CAPSULE ORAL EVERY EVENING
Status: DISCONTINUED | OUTPATIENT
Start: 2024-02-11 | End: 2024-02-12 | Stop reason: HOSPADM

## 2024-02-10 RX ORDER — TAMSULOSIN HYDROCHLORIDE 0.4 MG/1
0.4 CAPSULE ORAL
Status: DISCONTINUED | OUTPATIENT
Start: 2024-02-10 | End: 2024-02-12 | Stop reason: HOSPADM

## 2024-02-10 RX ADMIN — NICOTINE 7 MG: 7 PATCH TRANSDERMAL at 14:25

## 2024-02-10 RX ADMIN — ACETAMINOPHEN 1000 MG: 500 TABLET ORAL at 23:47

## 2024-02-10 RX ADMIN — OMEPRAZOLE 20 MG: 20 CAPSULE, DELAYED RELEASE ORAL at 05:33

## 2024-02-10 RX ADMIN — NICOTINE POLACRILEX 2 MG: 2 GUM, CHEWING BUCCAL at 20:53

## 2024-02-10 RX ADMIN — HYDROMORPHONE HYDROCHLORIDE 0.25 MG: 1 INJECTION, SOLUTION INTRAMUSCULAR; INTRAVENOUS; SUBCUTANEOUS at 00:33

## 2024-02-10 RX ADMIN — ACETAMINOPHEN 1000 MG: 500 TABLET ORAL at 00:33

## 2024-02-10 RX ADMIN — TAMSULOSIN HYDROCHLORIDE 0.4 MG: 0.4 CAPSULE ORAL at 14:25

## 2024-02-10 RX ADMIN — ACETAMINOPHEN 1000 MG: 500 TABLET ORAL at 17:20

## 2024-02-10 RX ADMIN — HYDROMORPHONE HYDROCHLORIDE 0.25 MG: 1 INJECTION, SOLUTION INTRAMUSCULAR; INTRAVENOUS; SUBCUTANEOUS at 20:52

## 2024-02-10 RX ADMIN — OXYCODONE 5 MG: 5 TABLET ORAL at 19:50

## 2024-02-10 RX ADMIN — ENOXAPARIN SODIUM 80 MG: 100 INJECTION SUBCUTANEOUS at 05:34

## 2024-02-10 RX ADMIN — CLOPIDOGREL BISULFATE 75 MG: 75 TABLET ORAL at 05:33

## 2024-02-10 RX ADMIN — MAGNESIUM SULFATE IN WATER FOR 4 G: 40 INJECTION INTRAVENOUS at 10:31

## 2024-02-10 RX ADMIN — HYDROMORPHONE HYDROCHLORIDE 0.5 MG: 1 INJECTION, SOLUTION INTRAMUSCULAR; INTRAVENOUS; SUBCUTANEOUS at 23:47

## 2024-02-10 RX ADMIN — ANTACID TABLETS 500 MG: 500 TABLET, CHEWABLE ORAL at 05:33

## 2024-02-10 RX ADMIN — ENOXAPARIN SODIUM 80 MG: 100 INJECTION SUBCUTANEOUS at 17:20

## 2024-02-10 RX ADMIN — ALUMINUM HYDROXIDE, MAGNESIUM HYDROXIDE, AND DIMETHICONE 10 ML: 400; 400; 40 SUSPENSION ORAL at 21:36

## 2024-02-10 RX ADMIN — NICOTINE POLACRILEX 2 MG: 2 GUM, CHEWING BUCCAL at 23:47

## 2024-02-10 RX ADMIN — NICOTINE POLACRILEX 2 MG: 2 GUM, CHEWING BUCCAL at 18:59

## 2024-02-10 RX ADMIN — CEPHALEXIN 500 MG: 500 CAPSULE ORAL at 05:33

## 2024-02-10 RX ADMIN — ACETAMINOPHEN 1000 MG: 500 TABLET ORAL at 05:33

## 2024-02-10 RX ADMIN — ACETAMINOPHEN 1000 MG: 500 TABLET ORAL at 12:10

## 2024-02-10 RX ADMIN — CEPHALEXIN 500 MG: 500 CAPSULE ORAL at 21:35

## 2024-02-10 RX ADMIN — CEPHALEXIN 500 MG: 500 CAPSULE ORAL at 14:25

## 2024-02-10 ASSESSMENT — ENCOUNTER SYMPTOMS
SHORTNESS OF BREATH: 0
COUGH: 0
MYALGIAS: 1

## 2024-02-10 ASSESSMENT — PAIN DESCRIPTION - PAIN TYPE
TYPE: ACUTE PAIN;CHRONIC PAIN
TYPE: ACUTE PAIN
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
## 2024-02-10 NOTE — PROGRESS NOTES
Progress Note    Author: AGUSTIN Ma Date & Time created: 2/10/2024   1:42 PM     Interval Events:  Douglas remains in place. Hydronephrosis still present on both sides. CT scan yesterday. Creatinine 2.84 on 24  ROS  Hemodynamics:  Temp (24hrs), Av.7 °C (98 °F), Min:36.6 °C (97.8 °F), Max:36.7 °C (98.1 °F)  Temperature: 36.6 °C (97.9 °F)  Pulse  Av.1  Min: 57  Max: 86   Blood Pressure: 120/81     Respiratory:    Respiration: 16, Pulse Oximetry: 98 %        RUL Breath Sounds: Diminished, RML Breath Sounds: Diminished, RLL Breath Sounds: Diminished, ROD Breath Sounds: Diminished, LLL Breath Sounds: Diminished  Neuro:  GCS       Fluids:    Intake/Output Summary (Last 24 hours) at 2/10/2024 1342  Last data filed at 2/10/2024 1000  Gross per 24 hour   Intake 1320 ml   Output 3000 ml   Net -1680 ml        Current Diet Order   Procedures    Diet Order Diet: Regular     Physical Exam  Labs:  No results found for this or any previous visit (from the past 24 hour(s)).  Medical Decision Making, by Problem:  Active Hospital Problems    Diagnosis     Hypomagnesemia [E83.42]     Hydronephrosis [N13.30]     Acute renal failure (ARF) (HCC) [N17.9]     DVT, lower extremity, proximal, acute, bilateral (HCC) [I82.4Y3]     Acute deep vein thrombosis (DVT) of left lower extremity, unspecified vein (HCC) [I82.402]     Cellulitis of lower extremity [L03.119]     Occlusion of right carotid artery [I65.21]     Occlusion of right brachial artery (HCC) [I70.208]     Hepatitis C [B19.20]      Plan:  Ordered BMP for creatinine    Keep douglas in place. Started flomax 0.4mg PO QD.     Quality Measures:  Quality-Core Measures    Discussed patient condition with Hospitalist

## 2024-02-10 NOTE — PROGRESS NOTES
Assumed care of patient. Assessment performed. Patient reports generalized pain in back and legs. Medicated for pain PRN. Patient also reports heartburn. Hospitalist notified and orders obtained for relevant medications. Patient currently wishes to have douglas removed. Call light and belongings within reach. All needs met a this time.     2240 Hospitalist notified of patient request to remove douglas. Not recommended at this time.

## 2024-02-10 NOTE — CARE PLAN
The patient is Stable - Low risk of patient condition declining or worsening    Shift Goals  Clinical Goals: Monitor for s/s of bleeding while on Heparin drip  Patient Goals: Rest  Family Goals: none present    Progress made toward(s) clinical / shift goals:      Patient is not progressing towards the following goals:    Pt is A&Ox4, VSS. Heparin drip D/C. Hightower in place draining yellow color urine. Medicated for N/V with effectiveness. Needs met. Safety measures in place.

## 2024-02-10 NOTE — PROGRESS NOTES
Hospital Medicine Daily Progress Note    Date of Service  2/10/2024    Chief Complaint  Jesus Gorman is a 58 y.o. male admitted 2/6/2024 with acute DVT    Hospital Course    58 y.o. male with past medical history of DVT which was provoked from surgery, CABG, cardiac surgery, carotid thrombectomy on Plavix who presented 2/6/2024 transfer from [facility] for worsening left lower extremity DVT.  Patient had arterial Doppler ultrasound which is negative for occlusion. Transferred here for lysis of DVT.  Left lower extremity ultrasound noted with extensive DVT of left lower extremity, Acute to subacute, deep venous thrombosis in the common femoral, femoral,  and popliteal veins with areas of complete occlusion and areas of partial occlusion, The proximal extent of the thrombus appears loosely attached..  Right lower extremity also noted with acute subacute to DVT.  This was discussed with IR Dr. Schrader ans  is planning for thrombolysis and IVC filter placement.  Urology was consulted for severe right-sided hydronephrosis and recommending continue Hightower and repeat ultrasound in 48 hours. S/p IVC filter placement by Dr. Schrader on 2/8.    Repeat CT abdomen pelvis on 2/10 noted with moderate to severe bilateral hydronephrosis, possible obstructing stone in the right UVJ.           Interval Problem Update      2/10/2024  Patient seen and examined at bedside  Labs reviewed normal white count, BMP pending    Repeat CT abdomen pelvis on 2/10 noted with moderate to severe bilateral hydronephrosis, possible obstructing stone in the right UVJ.    DOMINGO result reviewed which is unremarkable    Case request urology SARAH Nelson.       Started on Keflex  Continue IV fluid  On therapeutic Lovenox  Will get bilateral arterial ultrasound result pending    Repeat BMP in a.m. to monitor electrolytes and renal function  Repeat CBC in a.m. to monitor white count and hemoglobin while on therapeutic Lovenox  Urology following      Need close  monitoring of blood counts, electrolytes and renal function due to ongoing acute renal failure.  Need close monitoring while on therapeutic Lovenox    Requiring close monitoring for toxicity while on IV controlled substance  Patient has a high medical complexity, complex decision making and is at high risk of complication, morbidity and mortality      Patient wanted to sign out AMA.  I discussed with the patient the risks of leaving without receiving appropriate care to include permanent disability or death.  After extensive counseling he agreed to stay for further treatment for ongoing acute hydronephrosis      I have discussed this patient's plan of care and discharge plan at IDT rounds today with Case Management, Nursing, Nursing leadership, and other members of the IDT team.    Consultants/Specialty  urology and IR     Code Status  Full Code    Disposition  The patient is not medically cleared for discharge to home or a post-acute facility.  Anticipate discharge to: home with close outpatient follow-up    I have placed the appropriate orders for post-discharge needs.    Review of Systems  Review of Systems   Respiratory:  Negative for cough and shortness of breath.    Musculoskeletal:  Positive for joint pain and myalgias.        Physical Exam  Temp:  [36.6 °C (97.8 °F)-36.7 °C (98.1 °F)] 36.6 °C (97.9 °F)  Pulse:  [64-81] 64  Resp:  [16-18] 16  BP: (114-130)/(71-86) 120/81  SpO2:  [94 %-99 %] 98 %    Physical Exam  Constitutional:       Appearance: He is ill-appearing.   Cardiovascular:      Rate and Rhythm: Normal rate and regular rhythm.      Pulses: Normal pulses.      Heart sounds: Normal heart sounds.   Pulmonary:      Effort: No respiratory distress.      Breath sounds: Normal breath sounds.   Genitourinary:     Comments: On Hightower  Musculoskeletal:      Right lower leg: No edema.      Left lower leg: Edema present.      Comments: Bilateral lower extremity edema.  Diffuse erythema.  Warm and tender to  touch   Neurological:      General: No focal deficit present.      Mental Status: He is alert and oriented to person, place, and time.   Psychiatric:         Mood and Affect: Mood normal.         Fluids    Intake/Output Summary (Last 24 hours) at 2/10/2024 1419  Last data filed at 2/10/2024 1000  Gross per 24 hour   Intake 960 ml   Output 3000 ml   Net -2040 ml         Laboratory  Recent Labs     02/08/24  0453 02/09/24  0941   WBC 9.2 10.0   RBC 3.22* 3.30*   HEMOGLOBIN 10.1* 10.2*   HEMATOCRIT 29.5* 31.4*   MCV 91.6 95.2   MCH 31.4 30.9   MCHC 34.2 32.5   RDW 42.7 44.8   PLATELETCT 324 331   MPV 9.7 9.6       Recent Labs     02/08/24  0453   SODIUM 141   POTASSIUM 4.7   CHLORIDE 108   CO2 21   GLUCOSE 102*   BUN 48*   CREATININE 2.84*   CALCIUM 7.4*       Recent Labs     02/08/24  0453 02/08/24  1108 02/09/24  0941   APTT 66.5* 89.4* 74.1*   INR 1.16*  --   --                  Imaging  CT-ABDOMEN-PELVIS W/O   Final Result      1.  Moderate to severe bilateral hydronephrosis, similar to prior. Possible obstructing stone at the right UVJ. No calcified left renal obstruction seen.   2.  Multiple bladder stones again noted.   3.  Bladder is thick-walled and decompressed with a Hightower catheter. Correlate with urinalysis for acute infection.   4.  Subcutaneous edema of the visualized left lower extremity. Correlate for infection.   5.  Left greater than right inguinal lymphadenopathy.   6.  Small volume ascites.   7.  Distended gallbladder again noted.   8.  Small hiatal hernia.      US-EXTREMITY ARTERY LOWER BILAT   Final Result      US-RENAL   Final Result         1.  Severe right hydronephrosis appears somewhat decreased since prior study.      IR-INSERT IVC FILTER WITH IG & SI   Final Result      1. Ultrasound and fluoroscopic guided placement of an Option Elite infrarenal inferior vena cava filter.      2. Inferior vena cavagram within normal limits with no evidence of caval thrombus or occlusion.      US-EXTREMITY  VENOUS LOWER UNILAT RIGHT   Final Result      CT-RENAL COLIC EVALUATION(A/P W/O)   Final Result      1.  Moderate to severe bilateral hydronephrosis.  No calcified ureteral stone.   2.  Bladder decompressed by Hightower catheter with multiple bladder stones present.   3.  Distended gallbladder.   4.  Diffusely increased colonic stool suggesting constipation.   5.  Limited by lack of IV contrast.   6.  Diffuse LEFT thigh subcutaneous edema and LEFT groin adenopathy.      US-RENAL   Final Result      1.  Severe right-sided hydronephrosis.      2.  Multiple left renal cysts with apparent moderate hydronephrosis.      3.  Distended urinary bladder.      US-EXTREMITY VENOUS LOWER UNILAT LEFT   Final Result           Assessment/Plan  * Acute deep vein thrombosis (DVT) of left lower extremity, unspecified vein (HCC)- (present on admission)  Assessment & Plan  Left lower extremity duplex noted with acute/subacute DVT in common femoral, femoral and popliteal vein with area of complete occlusion and areas of partial occlusion.  DVT in  peroneal and posterior tibial vein.     S/p   IVC filter placement by Dr. Schrader on 2/8    Hypomagnesemia  Assessment & Plan  IV mag replaced  Check mag in AM     Hydronephrosis  Assessment & Plan  Renal ultrasound result reviewed, severe right hydronephrosis appears somewhat decreased on 2/9/2024  Continue Hightower  Urology following    2/10/2024  Repeat CT abdomen pelvis on 2/10 noted with moderate to severe bilateral hydronephrosis, possible obstructing stone in the right UVJ.    DVT, lower extremity, proximal, acute, bilateral (HCC)  Assessment & Plan  Left lower extremity ultrasound noted with extensive DVT of left lower extremity, Acute to subacute, deep venous thrombosis in the common femoral, femoral,  and popliteal veins with areas of complete occlusion and areas of partial occlusion, The proximal extent of the thrombus appears loosely attached..    Right lower extremity also noted with acute  subacute to DVT.    This was discussed with IR Dr. Schrader ans  is planning for thrombolysis and IVC filter placement.    Continue IV heparin drip.  I Am titrating a heparin drip based upon serial anti-Xa determinations   High risk of deterioration into acute PE.  Need close monitoring for oxygen requirement.  2/8/2024  High risk of deterioration into acute PE.  Need close monitoring for oxygen requirement.  Given extensive clot burden patient is requiring IVC filter.  I am titrating heparin drip with serial anti-Xa monitoring    Acute renal failure (ARF) (HCC)  Assessment & Plan    Acute kidney injury, likely secondary to obstructive uropathy due to right-sided hydronephrosis  Intravenous fluids  Avoid nephrotoxins, monitor inputs and outputs  Hightower placed  Urology following  Repeat kidney function closely  Case discussed with surgery urology Dr. Gallagher .  Urology evaluated and plan for continue Hightower and repeat ultrasound 48 hours.  Renal function improving    2/9/2024  Renal ultrasound result reviewed, severe right hydronephrosis appears somewhat decreased    2/10/2024  Repeat CT abdomen pelvis on 2/10 noted with moderate to severe bilateral hydronephrosis, possible obstructing stone in the right UVJ.    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 In review of yesterday's note (2/9/2024), there are no changes except  as documented above.         Greater than 51 minutes spent preparing to see patient (e.g. review of tests) obtaining and/or reviewing separately obtained history. Performing a medically appropriate examination and/ evaluation.  Counseling and educating the patient/family/caregiver.  Ordering medications, tests, or procedures.  Referring and communicating with other health care professionals.  Documenting clinical information in EPIC.  Independently interpreting results and communicating results to patient/family/caregiver.  Care coordination.

## 2024-02-10 NOTE — CARE PLAN
The patient is Stable - Low risk of patient condition declining or worsening    Shift Goals  Clinical Goals: Monitor pedal pulses, pain control  Patient Goals: Pain control, remove douglas  Family Goals: Not present    Progress made toward(s) clinical / shift goals:  pain control addressed, heartburn addressed    Problem: Knowledge Deficit - Standard  Goal: Patient and family/care givers will demonstrate understanding of plan of care, disease process/condition, diagnostic tests and medications  Outcome: Progressing  Note: Patient will verbalize understanding of education provided throughout the shift.      Problem: Pain - Standard  Goal: Alleviation of pain or a reduction in pain to the patient’s comfort goal  Outcome: Progressing  Note: Pain will be controlled using medication as per MAR, this shift.      Problem: Fluid Volume  Goal: Fluid volume balance will be maintained  Outcome: Progressing  Note: Patient will remain hydrated as evidenced by appropriate urine output this shift.        Patient is not progressing towards the following goals:

## 2024-02-10 NOTE — PROGRESS NOTES
"Radiology Progress Note   Author: Grace Fontenot D.N.P. Date & Time created: 2/9/2024  6:48 PM   Date of admission  2/6/2024  Note to reader: this note follows the APSO format rather than the historical SOAP format. Assessment and plan located at the top of the note for ease of use.    Chief Complaint  58 y.o. male admitted 2/6/2024 with   Chief Complaint   Patient presents with    Leg Swelling     Transfer for possible DVT.     HPI  Jesus Gorman is a 57 yo male with PMH significant for pulmonary embolism, DVT, carotid aneurysm repair, hepatitis C, GERD, BPH, tobacco dependence, and narcotic dependence who presented from an OSH for DVT not responding to Eliquis. At [facility], 2/6/24 - US lower extremity RIGHT shows an acute to subacute partially occlusive thrombus in the popliteal vein. 2/6/24 - US lower extremity LEFT shows acute to subacute, deep venous thrombosis in the common femoral, femoral, and popliteal veins with areas of complete occlusion and areas of partial occlusion. 2/8/24 IR Dr. Schrader placed an inferior vena cava filter.    Interval History IR  2/8/24: Pt reports LEFT thigh swelling feels much better, \"has gone down a bunch\", and is not tight anymore. LEFT thigh is red to pink in color, warm to touch, with popliteal pulses present. Darkened leathery skin with peeling epidermis and open ulcer to LEFT calf and foot. LEFT great toes has cap refill <3 sec.     2/9/24: RIGHT femoral angio site nontender, without ecchymosis, hematoma, oozing, or hematoma. Distal pulses +1 (baseline) bilaterally, skin cool, cap refill >3 sec. Patient denies groin or abdominal pain. He states swelling and tightness in LEFT thigh continues to improve. I reviewed patient's most recent labs including WBC 10.0, Hgb 10.2, Cr 2.84. Coordinated patient's post IR procedure care with hospital nursing staff.    Assessment/Plan     Principal Problem:    Acute deep vein thrombosis (DVT) of left lower extremity, unspecified vein (HCC)  Active " Problems:    Hepatitis C    Occlusion of right carotid artery    Occlusion of right brachial artery (HCC)    Cellulitis of lower extremity    Acute renal failure (ARF) (HCC)    DVT, lower extremity, proximal, acute, bilateral (HCC)    Hydronephrosis      Plan IR  -- IVC filter in place   - Post IR groin access site instructions: no lifting greater than 5 lbs and no baths/swimming/soaking in tub for 5 days. Shower OK. OK to change dressings/band aid as needed.   - IVC filter should be removed when the risk of PE/ DVT and the risks of filter removal are acceptably low  - Vascular Medicine will contact pt for follow up and management.   - Interventional Radiology signing off    -Thank you for allowing Interventional Radiology team to participate in the patients care, if any additional care or requests are needed in the future please do not hesitate call or place IR order   [phone]                 Review of Systems  Physical Exam   Review of Systems   Constitutional:  Negative for chills and fever.   Respiratory:  Negative for cough and shortness of breath.    Cardiovascular:  Positive for leg swelling. Negative for chest pain.   Gastrointestinal:  Positive for heartburn. Negative for abdominal pain, constipation, nausea and vomiting.   Musculoskeletal:  Positive for myalgias.   Neurological:  Negative for focal weakness and headaches.   Psychiatric/Behavioral:  Positive for substance abuse.       Vitals:    02/09/24 1446   BP: 130/86   Pulse: 75   Resp: 18   Temp: 36.7 °C (98.1 °F)   SpO2: 99%        Physical Exam  Vitals and nursing note reviewed.   Constitutional:       Appearance: He is ill-appearing.   HENT:      Head: Atraumatic.   Cardiovascular:      Rate and Rhythm: Normal rate.   Pulmonary:      Effort: Pulmonary effort is normal. No respiratory distress.   Abdominal:      General: Abdomen is flat. There is no distension.      Palpations: Abdomen is soft.      Tenderness: There is no abdominal tenderness.  There is no guarding.   Musculoskeletal:         General: Swelling present.      Right lower leg: Edema present.      Left lower leg: Edema present.      Comments: Peeling skin with darkened discoloration, open wounds to RLE & LLE   Skin:     General: Skin is warm and dry.      Capillary Refill: Capillary refill takes more than 3 seconds.      Coloration: Skin is pale.      Comments: RIGHT groin access site soft, non-tender, without ecchymosis; dressing cdi.      Neurological:      Mental Status: He is alert and oriented to person, place, and time.   Psychiatric:         Mood and Affect: Mood normal.         Behavior: Behavior normal.             Labs    Recent Labs     02/07/24  1226 02/08/24  0453 02/09/24  0941   WBC 11.5* 9.2 10.0   RBC 3.10* 3.22* 3.30*   HEMOGLOBIN 9.5* 10.1* 10.2*   HEMATOCRIT 29.9* 29.5* 31.4*   MCV 96.5 91.6 95.2   MCH 30.6 31.4 30.9   MCHC 31.8* 34.2 32.5   RDW 46.3 42.7 44.8   PLATELETCT 328 324 331   MPV 9.3 9.7 9.6     Recent Labs     02/07/24  1226 02/08/24  0453   SODIUM 142 141   POTASSIUM 5.1 4.7   CHLORIDE 112 108   CO2 21 21   GLUCOSE 84 102*   BUN 53* 48*   CREATININE 3.26* 2.84*   CALCIUM 7.9* 7.4*     Recent Labs     02/07/24  1226 02/08/24  0453   CREATININE 3.26* 2.84*     US-EXTREMITY ARTERY LOWER BILAT   Final Result      US-RENAL   Final Result         1.  Severe right hydronephrosis appears somewhat decreased since prior study.      IR-INSERT IVC FILTER WITH IG & SI   Final Result      1. Ultrasound and fluoroscopic guided placement of an Option Elite infrarenal inferior vena cava filter.      2. Inferior vena cavagram within normal limits with no evidence of caval thrombus or occlusion.      US-EXTREMITY VENOUS LOWER UNILAT RIGHT   Final Result      CT-RENAL COLIC EVALUATION(A/P W/O)   Final Result      1.  Moderate to severe bilateral hydronephrosis.  No calcified ureteral stone.   2.  Bladder decompressed by Hightower catheter with multiple bladder stones present.   3.   "Distended gallbladder.   4.  Diffusely increased colonic stool suggesting constipation.   5.  Limited by lack of IV contrast.   6.  Diffuse LEFT thigh subcutaneous edema and LEFT groin adenopathy.      US-RENAL   Final Result      1.  Severe right-sided hydronephrosis.      2.  Multiple left renal cysts with apparent moderate hydronephrosis.      3.  Distended urinary bladder.      US-EXTREMITY VENOUS LOWER UNILAT LEFT   Final Result      CT-ABDOMEN-PELVIS W/O    (Results Pending)       INR   Date Value Ref Range Status   02/08/2024 1.16 (H) 0.87 - 1.13 Final     Comment:     INR - Non-therapeutic Reference Range: 0.87-1.13  INR - Therapeutic Reference Range: 2.0-4.0       No results found for: \"POCINR\"     Intake/Output Summary (Last 24 hours) at 2/9/2024 1848  Last data filed at 2/9/2024 1446  Gross per 24 hour   Intake 2149.8 ml   Output 6900 ml   Net -4750.2 ml      Labs not explicitly included in this progress note were reviewed by the author. Radiology/imaging not explicitly included in this progress note was reviewed by the author.     I have performed a physical exam and reviewed and updated ROS and Plan today (2/9/2024).     55 minutes in directly providing and coordinating care and extensive data review.  No time overlap and excludes procedures.  "

## 2024-02-11 ENCOUNTER — APPOINTMENT (OUTPATIENT)
Dept: RADIOLOGY | Facility: MEDICAL CENTER | Age: 59
DRG: 300 | End: 2024-02-11
Payer: COMMERCIAL

## 2024-02-11 LAB
ANION GAP SERPL CALC-SCNC: 9 MMOL/L (ref 7–16)
BUN SERPL-MCNC: 32 MG/DL (ref 8–22)
CALCIUM SERPL-MCNC: 8 MG/DL (ref 8.5–10.5)
CHLORIDE SERPL-SCNC: 103 MMOL/L (ref 96–112)
CO2 SERPL-SCNC: 21 MMOL/L (ref 20–33)
CREAT SERPL-MCNC: 2.07 MG/DL (ref 0.5–1.4)
GFR SERPLBLD CREATININE-BSD FMLA CKD-EPI: 37 ML/MIN/1.73 M 2
GLUCOSE BLD STRIP.AUTO-MCNC: 121 MG/DL (ref 65–99)
GLUCOSE BLD STRIP.AUTO-MCNC: 133 MG/DL (ref 65–99)
GLUCOSE SERPL-MCNC: 119 MG/DL (ref 65–99)
MAGNESIUM SERPL-MCNC: 1.7 MG/DL (ref 1.5–2.5)
POTASSIUM SERPL-SCNC: 5.1 MMOL/L (ref 3.6–5.5)
POTASSIUM SERPL-SCNC: 5.7 MMOL/L (ref 3.6–5.5)
SODIUM SERPL-SCNC: 133 MMOL/L (ref 135–145)

## 2024-02-11 PROCEDURE — C9399 UNCLASSIFIED DRUGS OR BIOLOG: HCPCS | Performed by: STUDENT IN AN ORGANIZED HEALTH CARE EDUCATION/TRAINING PROGRAM

## 2024-02-11 PROCEDURE — 82962 GLUCOSE BLOOD TEST: CPT

## 2024-02-11 PROCEDURE — A9270 NON-COVERED ITEM OR SERVICE: HCPCS

## 2024-02-11 PROCEDURE — 700102 HCHG RX REV CODE 250 W/ 637 OVERRIDE(OP)

## 2024-02-11 PROCEDURE — 700102 HCHG RX REV CODE 250 W/ 637 OVERRIDE(OP): Performed by: STUDENT IN AN ORGANIZED HEALTH CARE EDUCATION/TRAINING PROGRAM

## 2024-02-11 PROCEDURE — 700111 HCHG RX REV CODE 636 W/ 250 OVERRIDE (IP)

## 2024-02-11 PROCEDURE — 770006 HCHG ROOM/CARE - MED/SURG/GYN SEMI*

## 2024-02-11 PROCEDURE — 84132 ASSAY OF SERUM POTASSIUM: CPT

## 2024-02-11 PROCEDURE — 80048 BASIC METABOLIC PNL TOTAL CA: CPT

## 2024-02-11 PROCEDURE — 36415 COLL VENOUS BLD VENIPUNCTURE: CPT

## 2024-02-11 PROCEDURE — 99233 SBSQ HOSP IP/OBS HIGH 50: CPT | Performed by: STUDENT IN AN ORGANIZED HEALTH CARE EDUCATION/TRAINING PROGRAM

## 2024-02-11 PROCEDURE — 700111 HCHG RX REV CODE 636 W/ 250 OVERRIDE (IP): Performed by: STUDENT IN AN ORGANIZED HEALTH CARE EDUCATION/TRAINING PROGRAM

## 2024-02-11 PROCEDURE — 700101 HCHG RX REV CODE 250: Performed by: STUDENT IN AN ORGANIZED HEALTH CARE EDUCATION/TRAINING PROGRAM

## 2024-02-11 PROCEDURE — 83735 ASSAY OF MAGNESIUM: CPT

## 2024-02-11 PROCEDURE — A9270 NON-COVERED ITEM OR SERVICE: HCPCS | Performed by: STUDENT IN AN ORGANIZED HEALTH CARE EDUCATION/TRAINING PROGRAM

## 2024-02-11 RX ORDER — FUROSEMIDE 10 MG/ML
20 INJECTION INTRAMUSCULAR; INTRAVENOUS ONCE
Status: COMPLETED | OUTPATIENT
Start: 2024-02-11 | End: 2024-02-11

## 2024-02-11 RX ORDER — DEXTROSE MONOHYDRATE 25 G/50ML
25 INJECTION, SOLUTION INTRAVENOUS ONCE
Status: COMPLETED | OUTPATIENT
Start: 2024-02-11 | End: 2024-02-11

## 2024-02-11 RX ADMIN — CLOPIDOGREL BISULFATE 75 MG: 75 TABLET ORAL at 05:47

## 2024-02-11 RX ADMIN — ANTACID TABLETS 500 MG: 500 TABLET, CHEWABLE ORAL at 05:47

## 2024-02-11 RX ADMIN — ACETAMINOPHEN 1000 MG: 500 TABLET ORAL at 11:15

## 2024-02-11 RX ADMIN — OXYCODONE 5 MG: 5 TABLET ORAL at 08:43

## 2024-02-11 RX ADMIN — CEPHALEXIN 500 MG: 500 CAPSULE ORAL at 13:18

## 2024-02-11 RX ADMIN — NICOTINE POLACRILEX 2 MG: 2 GUM, CHEWING BUCCAL at 05:47

## 2024-02-11 RX ADMIN — SODIUM BICARBONATE 50 MEQ: 84 INJECTION INTRAVENOUS at 13:17

## 2024-02-11 RX ADMIN — FUROSEMIDE 20 MG: 10 INJECTION, SOLUTION INTRAVENOUS at 10:42

## 2024-02-11 RX ADMIN — OXYCODONE 5 MG: 5 TABLET ORAL at 19:49

## 2024-02-11 RX ADMIN — HYDROMORPHONE HYDROCHLORIDE 0.25 MG: 1 INJECTION, SOLUTION INTRAMUSCULAR; INTRAVENOUS; SUBCUTANEOUS at 05:47

## 2024-02-11 RX ADMIN — NICOTINE 7 MG: 7 PATCH TRANSDERMAL at 05:48

## 2024-02-11 RX ADMIN — SODIUM ZIRCONIUM CYCLOSILICATE 10 G: 10 POWDER, FOR SUSPENSION ORAL at 13:17

## 2024-02-11 RX ADMIN — OXYCODONE 2.5 MG: 5 TABLET ORAL at 10:42

## 2024-02-11 RX ADMIN — INSULIN HUMAN 4.5 UNITS: 100 INJECTION, SOLUTION PARENTERAL at 11:11

## 2024-02-11 RX ADMIN — ENOXAPARIN SODIUM 80 MG: 100 INJECTION SUBCUTANEOUS at 05:48

## 2024-02-11 RX ADMIN — CEPHALEXIN 500 MG: 500 CAPSULE ORAL at 05:47

## 2024-02-11 RX ADMIN — HYDROMORPHONE HYDROCHLORIDE 0.25 MG: 1 INJECTION, SOLUTION INTRAMUSCULAR; INTRAVENOUS; SUBCUTANEOUS at 13:22

## 2024-02-11 RX ADMIN — OMEPRAZOLE 20 MG: 20 CAPSULE, DELAYED RELEASE ORAL at 05:47

## 2024-02-11 RX ADMIN — NICOTINE POLACRILEX 2 MG: 2 GUM, CHEWING BUCCAL at 13:27

## 2024-02-11 RX ADMIN — OXYCODONE 5 MG: 5 TABLET ORAL at 02:19

## 2024-02-11 RX ADMIN — ENOXAPARIN SODIUM 80 MG: 100 INJECTION SUBCUTANEOUS at 16:57

## 2024-02-11 RX ADMIN — NICOTINE POLACRILEX 2 MG: 2 GUM, CHEWING BUCCAL at 10:06

## 2024-02-11 RX ADMIN — TAMSULOSIN HYDROCHLORIDE 0.4 MG: 0.4 CAPSULE ORAL at 08:43

## 2024-02-11 RX ADMIN — NICOTINE POLACRILEX 2 MG: 2 GUM, CHEWING BUCCAL at 16:04

## 2024-02-11 RX ADMIN — DEXTROSE MONOHYDRATE 25 G: 25 INJECTION, SOLUTION INTRAVENOUS at 11:15

## 2024-02-11 RX ADMIN — HYDROMORPHONE HYDROCHLORIDE 0.25 MG: 1 INJECTION, SOLUTION INTRAMUSCULAR; INTRAVENOUS; SUBCUTANEOUS at 23:36

## 2024-02-11 RX ADMIN — NICOTINE POLACRILEX 2 MG: 2 GUM, CHEWING BUCCAL at 19:53

## 2024-02-11 RX ADMIN — ACETAMINOPHEN 1000 MG: 500 TABLET ORAL at 05:48

## 2024-02-11 ASSESSMENT — PAIN DESCRIPTION - PAIN TYPE
TYPE: ACUTE PAIN

## 2024-02-11 ASSESSMENT — ENCOUNTER SYMPTOMS
SHORTNESS OF BREATH: 0
COUGH: 0
MYALGIAS: 1

## 2024-02-11 NOTE — PROGRESS NOTES
Hospital Medicine Daily Progress Note    Date of Service  2/11/2024    Chief Complaint  Jesus Gorman is a 58 y.o. male admitted 2/6/2024 with acute DVT    Hospital Course    58 y.o. male with past medical history of DVT which was provoked from surgery, CABG, cardiac surgery, carotid thrombectomy on Plavix who presented 2/6/2024 transfer from [facility] for worsening left lower extremity DVT.  Patient had arterial Doppler ultrasound which is negative for occlusion. Transferred here for lysis of DVT.  Left lower extremity ultrasound noted with extensive DVT of left lower extremity, Acute to subacute, deep venous thrombosis in the common femoral, femoral,  and popliteal veins with areas of complete occlusion and areas of partial occlusion, The proximal extent of the thrombus appears loosely attached..  Right lower extremity also noted with acute subacute to DVT.  This was discussed with IR Dr. Schrader ans  is planning for thrombolysis and IVC filter placement.  Urology was consulted for severe right-sided hydronephrosis and recommending continue Hightower and repeat ultrasound in 48 hours. S/p IVC filter placement by Dr. Schrader on 2/8.    Repeat CT abdomen pelvis on 2/10 noted with moderate to severe bilateral hydronephrosis, possible obstructing stone in the right UVJ.  Urology planning for cystoscopy with possible stent           Interval Problem Update      2/11/2024  Vitals remained stable  Remains asymptomatic  Labs reviewed noted sodium 133, potassium 5.7, improving BUN/creatinine 32/2.07, hypocalcemia, phosphorus 3.5, magnesium 1.7      Case request urology SARAH Russell         Complete course of antibiotic  Hyperkalemia treated with D5 half NS, sodium bicarbonate, Lokelma  Continue IV fluid  On therapeutic Lovenox, monitor for bleeding  Urology following  Repeat serum potassium level later  Repeat BMP in a.m. to monitor electrolytes and renal function  Repeat CBC in a.m. to monitor white count and hemoglobin  while on therapeutic Lovenox  Scheduled for cystoscopy with stent placement tomorrow    Need close monitoring of blood counts, electrolytes and renal function due to ongoing acute renal failure.  Need close monitoring while on therapeutic Lovenox    Requiring close monitoring for toxicity while on IV controlled substance        Patient wanted to sign out AMA.  I discussed with the patient the risks of leaving without receiving appropriate care to include permanent disability or death.  After extensive counseling he agreed to stay for further treatment for ongoing acute hydronephrosis      I have discussed this patient's plan of care and discharge plan at IDT rounds today with Case Management, Nursing, Nursing leadership, and other members of the IDT team.    Consultants/Specialty  urology and IR     Code Status  Full Code    Disposition  The patient is not medically cleared for discharge to home or a post-acute facility.  Anticipate discharge to: home with close outpatient follow-up    I have placed the appropriate orders for post-discharge needs.    Review of Systems  Review of Systems   Respiratory:  Negative for cough and shortness of breath.    Musculoskeletal:  Positive for joint pain and myalgias.        Physical Exam  Temp:  [36.6 °C (97.8 °F)-37.2 °C (99 °F)] 36.8 °C (98.3 °F)  Pulse:  [68-82] 82  Resp:  [16-18] 16  BP: ()/(65-79) 114/79  SpO2:  [97 %-100 %] 99 %    Physical Exam  Constitutional:       Appearance: He is ill-appearing.   Cardiovascular:      Rate and Rhythm: Normal rate and regular rhythm.      Pulses: Normal pulses.      Heart sounds: Normal heart sounds.   Pulmonary:      Effort: No respiratory distress.      Breath sounds: Normal breath sounds.   Genitourinary:     Comments: On Hightower  Musculoskeletal:      Right lower leg: No edema.      Left lower leg: Edema present.      Comments: Bilateral lower extremity edema.  Diffuse erythema.  Warm and tender to touch   Neurological:       General: No focal deficit present.      Mental Status: He is alert and oriented to person, place, and time.   Psychiatric:         Mood and Affect: Mood normal.         Fluids    Intake/Output Summary (Last 24 hours) at 2/11/2024 1511  Last data filed at 2/11/2024 1000  Gross per 24 hour   Intake 600 ml   Output 5850 ml   Net -5250 ml         Laboratory  Recent Labs     02/09/24  0941 02/10/24  1519   WBC 10.0 9.4   RBC 3.30* 3.71*   HEMOGLOBIN 10.2* 11.3*   HEMATOCRIT 31.4* 34.5*   MCV 95.2 93.0   MCH 30.9 30.5   MCHC 32.5 32.8   RDW 44.8 44.5   PLATELETCT 331 375   MPV 9.6 9.2       Recent Labs     02/10/24  1519 02/11/24  0937   SODIUM 133* 133*   POTASSIUM 5.0 5.7*   CHLORIDE 101 103   CO2 21 21   GLUCOSE 125* 119*   BUN 28* 32*   CREATININE 2.24* 2.07*   CALCIUM 8.5 8.0*       Recent Labs     02/09/24  0941   APTT 74.1*                 Imaging  CT-ABDOMEN-PELVIS W/O   Final Result      1.  Moderate to severe bilateral hydronephrosis, similar to prior. Possible obstructing stone at the right UVJ. No calcified left renal obstruction seen.   2.  Multiple bladder stones again noted.   3.  Bladder is thick-walled and decompressed with a Hightower catheter. Correlate with urinalysis for acute infection.   4.  Subcutaneous edema of the visualized left lower extremity. Correlate for infection.   5.  Left greater than right inguinal lymphadenopathy.   6.  Small volume ascites.   7.  Distended gallbladder again noted.   8.  Small hiatal hernia.      US-EXTREMITY ARTERY LOWER BILAT   Final Result      US-RENAL   Final Result         1.  Severe right hydronephrosis appears somewhat decreased since prior study.      IR-INSERT IVC FILTER WITH IG & SI   Final Result      1. Ultrasound and fluoroscopic guided placement of an Option Elite infrarenal inferior vena cava filter.      2. Inferior vena cavagram within normal limits with no evidence of caval thrombus or occlusion.      US-EXTREMITY VENOUS LOWER UNILAT RIGHT   Final  Result      CT-RENAL COLIC EVALUATION(A/P W/O)   Final Result      1.  Moderate to severe bilateral hydronephrosis.  No calcified ureteral stone.   2.  Bladder decompressed by Hightower catheter with multiple bladder stones present.   3.  Distended gallbladder.   4.  Diffusely increased colonic stool suggesting constipation.   5.  Limited by lack of IV contrast.   6.  Diffuse LEFT thigh subcutaneous edema and LEFT groin adenopathy.      US-RENAL   Final Result      1.  Severe right-sided hydronephrosis.      2.  Multiple left renal cysts with apparent moderate hydronephrosis.      3.  Distended urinary bladder.      US-EXTREMITY VENOUS LOWER UNILAT LEFT   Final Result      NM-RENAL WITH DIURETIC WASHOUT    (Results Pending)        Assessment/Plan  * Acute deep vein thrombosis (DVT) of left lower extremity, unspecified vein (HCC)- (present on admission)  Assessment & Plan  Left lower extremity duplex noted with acute/subacute DVT in common femoral, femoral and popliteal vein with area of complete occlusion and areas of partial occlusion.  DVT in  peroneal and posterior tibial vein.   IVC filter placement by Dr. Schrader on 2/8  Therapeutic Lovenox, switch to oral anticoagulant at discharge    Hypomagnesemia  Assessment & Plan  Improved  Repeat labs in a.m.    Hydronephrosis  Assessment & Plan  Renal ultrasound result reviewed, severe right hydronephrosis appears somewhat decreased on 2/9/2024  Continue Hightower  Urology following  Repeat CT abdomen pelvis on 2/10 noted with moderate to severe bilateral hydronephrosis, possible obstructing stone in the right UVJ.  Scheduled for cystoscopy with stent placement    DVT, lower extremity, proximal, acute, bilateral (HCC)  Assessment & Plan  Status post IVC filter  Currently on therapeutic Lovenox    Acute renal failure (ARF) (HCC)  Assessment & Plan    Acute kidney injury, likely secondary to obstructive uropathy due to right-sided hydronephrosis  Intravenous fluids  Avoid  nephrotoxins, monitor inputs and outputs  Hightower placed  Urology following  Repeat kidney function closely  Repeat CT abdomen pelvis on 2/10 noted with moderate to severe bilateral hydronephrosis, possible obstructing stone in the right UVJ.  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 In review of yesterday's note (2/10/2024), there are no changes except as documented above.

## 2024-02-11 NOTE — CARE PLAN
The patient is Stable - Low risk of patient condition declining or worsening    Shift Goals  Clinical Goals: Pain management, safety  Patient Goals: pain control  Family Goals: Not present    Progress made toward(s) clinical / shift goals:      Patient is not progressing towards the following goals:      Problem: Pain - Standard  Goal: Alleviation of pain or a reduction in pain to the patient’s comfort goal  Description: Target End Date:  Prior to discharge or change in level of care    Document on Vitals flowsheet    1.  Document pain using the appropriate pain scale per order or unit policy  2.  Educate and implement non-pharmacologic comfort measures (i.e. relaxation, distraction, massage, cold/heat therapy, etc.)  3.  Pain management medications as ordered  4.  Reassess pain after pain med administration per policy  5.  If opiods administered assess patient's response to pain medication is appropriate per POSS sedation scale  6.  Follow pain management plan developed in collaboration with patient and interdisciplinary team (including palliative care or pain specialists if applicable)  Outcome: Not Progressing  Note: Pt reported generalized/BLE pain, pain managed with pharmacological nad non-pharmacological strategies. Pt will report tolerable pain with ambulation t the end of shift.

## 2024-02-11 NOTE — PROGRESS NOTES
Progress Note    Author: AGUSTIN Ma Date & Time created: 2024   10:12 AM     Interval Events:  Creatinine 2.07 today-mild improvement but still elevated. Hydronephrosis continues to be  present on imaging.   ROS  Hemodynamics:  Temp (24hrs), Av.9 °C (98.4 °F), Min:36.6 °C (97.8 °F), Max:37.2 °C (99 °F)  Temperature: 36.8 °C (98.3 °F)  Pulse  Av.8  Min: 57  Max: 86   Blood Pressure: 114/79     Respiratory:    Respiration: 16, Pulse Oximetry: 99 %        RUL Breath Sounds: Diminished, RML Breath Sounds: Diminished, RLL Breath Sounds: Diminished, ROD Breath Sounds: Diminished, LLL Breath Sounds: Diminished  Neuro:  GCS       Fluids:    Intake/Output Summary (Last 24 hours) at 2024 1012  Last data filed at 2024 0546  Gross per 24 hour   Intake 960 ml   Output 5850 ml   Net -4890 ml        Current Diet Order   Procedures    Diet Order Diet: Regular; Tray Modifications (optional): Safety Tray - Plastic dishware, utensils unwrapped (Suicide Watch)    Diet NPO Restrict to: Sips with Medications     Physical Exam  Labs:  Recent Results (from the past 24 hour(s))   Basic Metabolic Panel    Collection Time: 02/10/24  3:19 PM   Result Value Ref Range    Sodium 133 (L) 135 - 145 mmol/L    Potassium 5.0 3.6 - 5.5 mmol/L    Chloride 101 96 - 112 mmol/L    Co2 21 20 - 33 mmol/L    Glucose 125 (H) 65 - 99 mg/dL    Bun 28 (H) 8 - 22 mg/dL    Creatinine 2.24 (H) 0.50 - 1.40 mg/dL    Calcium 8.5 8.5 - 10.5 mg/dL    Anion Gap 11.0 7.0 - 16.0   CBC WITH DIFFERENTIAL    Collection Time: 02/10/24  3:19 PM   Result Value Ref Range    WBC 9.4 4.8 - 10.8 K/uL    RBC 3.71 (L) 4.70 - 6.10 M/uL    Hemoglobin 11.3 (L) 14.0 - 18.0 g/dL    Hematocrit 34.5 (L) 42.0 - 52.0 %    MCV 93.0 81.4 - 97.8 fL    MCH 30.5 27.0 - 33.0 pg    MCHC 32.8 32.3 - 36.5 g/dL    RDW 44.5 35.9 - 50.0 fL    Platelet Count 375 164 - 446 K/uL    MPV 9.2 9.0 - 12.9 fL    Neutrophils-Polys 80.50 (H) 44.00 - 72.00 %    Lymphocytes 11.40  (L) 22.00 - 41.00 %    Monocytes 4.40 0.00 - 13.40 %    Eosinophils 2.00 0.00 - 6.90 %    Basophils 1.00 0.00 - 1.80 %    Immature Granulocytes 0.70 0.00 - 0.90 %    Nucleated RBC 0.00 0.00 - 0.20 /100 WBC    Neutrophils (Absolute) 7.58 (H) 1.82 - 7.42 K/uL    Lymphs (Absolute) 1.07 1.00 - 4.80 K/uL    Monos (Absolute) 0.41 0.00 - 0.85 K/uL    Eos (Absolute) 0.19 0.00 - 0.51 K/uL    Baso (Absolute) 0.09 0.00 - 0.12 K/uL    Immature Granulocytes (abs) 0.07 0.00 - 0.11 K/uL    NRBC (Absolute) 0.00 K/uL   ESTIMATED GFR    Collection Time: 02/10/24  3:19 PM   Result Value Ref Range    GFR (CKD-EPI) 33 (A) >60 mL/min/1.73 m 2   MAGNESIUM    Collection Time: 02/11/24  9:37 AM   Result Value Ref Range    Magnesium 1.7 1.5 - 2.5 mg/dL   Basic Metabolic Panel    Collection Time: 02/11/24  9:37 AM   Result Value Ref Range    Sodium 133 (L) 135 - 145 mmol/L    Potassium 5.7 (H) 3.6 - 5.5 mmol/L    Chloride 103 96 - 112 mmol/L    Co2 21 20 - 33 mmol/L    Glucose 119 (H) 65 - 99 mg/dL    Bun 32 (H) 8 - 22 mg/dL    Creatinine 2.07 (H) 0.50 - 1.40 mg/dL    Calcium 8.0 (L) 8.5 - 10.5 mg/dL    Anion Gap 9.0 7.0 - 16.0   ESTIMATED GFR    Collection Time: 02/11/24  9:37 AM   Result Value Ref Range    GFR (CKD-EPI) 37 (A) >60 mL/min/1.73 m 2     Medical Decision Making, by Problem:  Active Hospital Problems    Diagnosis     Hypomagnesemia [E83.42]     Hydronephrosis [N13.30]     Acute renal failure (ARF) (HCC) [N17.9]     DVT, lower extremity, proximal, acute, bilateral (HCC) [I82.4Y3]     Acute deep vein thrombosis (DVT) of left lower extremity, unspecified vein (HCC) [I82.402]     Cellulitis of lower extremity [L03.119]     Occlusion of right carotid artery [I65.21]     Occlusion of right brachial artery (HCC) [I70.208]     Hepatitis C [B19.20]      Plan:  NPO at midnight to assess for functional obstruction with Dr Gallagher with cysto retrograde pyelogram, stent possible ureteroscopy in OR.     We have also ordered a renal-lasix  scan    Quality Measures:  Quality-Core Measures    Discussed patient condition with Hospitalist

## 2024-02-11 NOTE — CARE PLAN
The patient is Stable - Low risk of patient condition declining or worsening    Shift Goals  Clinical Goals: Monitor labs, pain control  Patient Goals: Pain control, remove douglas  Family Goals: Not present    Progress made toward(s) clinical / shift goals:      Patient is not progressing towards the following goals:    Pt care assumed, pt is A&Ox4. Medicated per MAR, well tolerated. All needs attended. Douglas in place draining yellow color urine. Pt refused dressing changed, telesitter at bedside. Bed alarm on, call light within reach.

## 2024-02-11 NOTE — PROGRESS NOTES
Assumed care of patient. Assessment performed. Telesitter at bedside and bed alarm in place. Patient allowed for dressing change to wound on calf, changed as per order. Medicated for pain as per MAR. Medications discussed. Call light within reach. Patient's only request at this time is for pain control and nicorette gum. All needs met at this time.

## 2024-02-11 NOTE — CARE PLAN
The patient is Stable - Low risk of patient condition declining or worsening    Shift Goals  Clinical Goals: Pain control, safety  Patient Goals: Pain control  Family Goals: Not present    Progress made toward(s) clinical / shift goals:  pain control addressed, safety protocols in place    Problem: Knowledge Deficit - Standard  Goal: Patient and family/care givers will demonstrate understanding of plan of care, disease process/condition, diagnostic tests and medications  Outcome: Progressing  Note: Patient will verbalize understanding of education provided throughout the shift.      Problem: Pain - Standard  Goal: Alleviation of pain or a reduction in pain to the patient’s comfort goal  Outcome: Progressing  Note: This shift, pain will be controlled using medication as per MAR as well as alternative methods.      Problem: Fluid Volume  Goal: Fluid volume balance will be maintained  Outcome: Progressing  Note: This shift, patient will remain hydrated as evidenced by appropriate urine output.     Patient is not progressing towards the following goals:

## 2024-02-12 ENCOUNTER — APPOINTMENT (OUTPATIENT)
Dept: RADIOLOGY | Facility: MEDICAL CENTER | Age: 59
DRG: 300 | End: 2024-02-12
Payer: COMMERCIAL

## 2024-02-12 VITALS
HEART RATE: 78 BPM | OXYGEN SATURATION: 96 % | DIASTOLIC BLOOD PRESSURE: 81 MMHG | BODY MASS INDEX: 28.9 KG/M2 | HEIGHT: 68 IN | WEIGHT: 190.7 LBS | TEMPERATURE: 97.8 F | RESPIRATION RATE: 17 BRPM | SYSTOLIC BLOOD PRESSURE: 112 MMHG

## 2024-02-12 LAB
MAGNESIUM SERPL-MCNC: 1.7 MG/DL (ref 1.5–2.5)
PHOSPHATE SERPL-MCNC: 3 MG/DL (ref 2.5–4.5)

## 2024-02-12 PROCEDURE — 700102 HCHG RX REV CODE 250 W/ 637 OVERRIDE(OP): Performed by: STUDENT IN AN ORGANIZED HEALTH CARE EDUCATION/TRAINING PROGRAM

## 2024-02-12 PROCEDURE — 700102 HCHG RX REV CODE 250 W/ 637 OVERRIDE(OP)

## 2024-02-12 PROCEDURE — A9270 NON-COVERED ITEM OR SERVICE: HCPCS

## 2024-02-12 PROCEDURE — 83735 ASSAY OF MAGNESIUM: CPT

## 2024-02-12 PROCEDURE — 99232 SBSQ HOSP IP/OBS MODERATE 35: CPT | Performed by: STUDENT IN AN ORGANIZED HEALTH CARE EDUCATION/TRAINING PROGRAM

## 2024-02-12 PROCEDURE — 36415 COLL VENOUS BLD VENIPUNCTURE: CPT

## 2024-02-12 PROCEDURE — 700111 HCHG RX REV CODE 636 W/ 250 OVERRIDE (IP): Performed by: STUDENT IN AN ORGANIZED HEALTH CARE EDUCATION/TRAINING PROGRAM

## 2024-02-12 PROCEDURE — 84100 ASSAY OF PHOSPHORUS: CPT

## 2024-02-12 PROCEDURE — A9270 NON-COVERED ITEM OR SERVICE: HCPCS | Performed by: STUDENT IN AN ORGANIZED HEALTH CARE EDUCATION/TRAINING PROGRAM

## 2024-02-12 RX ORDER — NICOTINE 21 MG/24HR
21 PATCH, TRANSDERMAL 24 HOURS TRANSDERMAL
Status: DISCONTINUED | OUTPATIENT
Start: 2024-02-12 | End: 2024-02-12 | Stop reason: HOSPADM

## 2024-02-12 RX ORDER — TAMSULOSIN HYDROCHLORIDE 0.4 MG/1
0.4 CAPSULE ORAL DAILY
Qty: 30 CAPSULE | Refills: 0 | Status: SHIPPED | OUTPATIENT
Start: 2024-02-12

## 2024-02-12 RX ORDER — HYDROMORPHONE HYDROCHLORIDE 1 MG/ML
0.5 INJECTION, SOLUTION INTRAMUSCULAR; INTRAVENOUS; SUBCUTANEOUS ONCE
Status: DISCONTINUED | OUTPATIENT
Start: 2024-02-12 | End: 2024-02-12 | Stop reason: HOSPADM

## 2024-02-12 RX ADMIN — OMEPRAZOLE 20 MG: 20 CAPSULE, DELAYED RELEASE ORAL at 04:47

## 2024-02-12 RX ADMIN — TAMSULOSIN HYDROCHLORIDE 0.4 MG: 0.4 CAPSULE ORAL at 07:50

## 2024-02-12 RX ADMIN — CLOPIDOGREL BISULFATE 75 MG: 75 TABLET ORAL at 04:49

## 2024-02-12 RX ADMIN — NICOTINE 7 MG: 7 PATCH TRANSDERMAL at 04:47

## 2024-02-12 RX ADMIN — HYDROMORPHONE HYDROCHLORIDE 0.25 MG: 1 INJECTION, SOLUTION INTRAMUSCULAR; INTRAVENOUS; SUBCUTANEOUS at 07:49

## 2024-02-12 RX ADMIN — NICOTINE POLACRILEX 2 MG: 2 GUM, CHEWING BUCCAL at 08:02

## 2024-02-12 RX ADMIN — NICOTINE POLACRILEX 2 MG: 2 GUM, CHEWING BUCCAL at 04:53

## 2024-02-12 RX ADMIN — OXYCODONE 5 MG: 5 TABLET ORAL at 04:49

## 2024-02-12 RX ADMIN — ANTACID TABLETS 500 MG: 500 TABLET, CHEWABLE ORAL at 04:47

## 2024-02-12 ASSESSMENT — ENCOUNTER SYMPTOMS
COUGH: 0
SHORTNESS OF BREATH: 0
MYALGIAS: 1

## 2024-02-12 ASSESSMENT — PAIN DESCRIPTION - PAIN TYPE: TYPE: ACUTE PAIN

## 2024-02-12 NOTE — PROGRESS NOTES
The patient is leaving against medical advice.  I discussed with the patient the risks of leaving without receiving appropriate care to include permanent disability or death.  I have discussed possible alternatives.  The patient is not intoxicated.  The patient is a capable decision maker and understands the risks and benefits of their decision.  While I certainly disagree with the patient's decision, I respect the patient's autonomy and will not keep them here against their will.  They understand that their decision to leave can be reversed at any time and they can return to us at any time for any reason at all.         I spoke with the patient at bedside.  I discussed with him  regarding need of blood thinner and surgical intervention given bilateral hydronephrosis and MELIZA.  While I was talking with room he got agitated and left the room.        I have sent Eliquis to pharmacy at [facility] in [place].    I attempted to call patient's spouse and patient number provided in chart.  Is not reachable

## 2024-02-12 NOTE — CARE PLAN
Problem: Pain - Standard  Goal: Alleviation of pain or a reduction in pain to the patient’s comfort goal  Outcome: Progressing     The patient is Watcher - Medium risk of patient condition declining or worsening    Shift Goals  Clinical Goals: pain management, NPO 0000  Patient Goals: pain management  Family Goals: NANCY    Progress made toward(s) clinical / shift goals:  Pt. Complains of BLE pain, medicated appropriately see MAR.  Pt. NPO at 0000 for cystocospy in the AM.  Telesitter in place.  Pt. Pulled off wanderguard today per RN report, wanderguards currently unavailable.  Call light and belongings in reach, treaded socks on, bed alarm on, yellow fall risk band on, appropriate fall risk sign on door.

## 2024-02-12 NOTE — PROGRESS NOTES
Axo4. Able to make needs known. Pt left AMA. Re-educated pt on the importance of completing procedures and tests. Pt shows understanding but pt still wants to leave. MD aware. Charge nurse aware. IV lines removed. Hightower catheter removed. Personal belongings given back to patient.

## 2024-02-12 NOTE — PROGRESS NOTES
Hospital Medicine Daily Progress Note    Date of Service  2/12/2024    Chief Complaint  Jesus Gorman is a 58 y.o. male admitted 2/6/2024 with acute DVT    Hospital Course    58 y.o. male with past medical history of DVT which was provoked from surgery, CABG, cardiac surgery, carotid thrombectomy on Plavix who presented 2/6/2024 transfer from [facility] for worsening left lower extremity DVT.  Patient had arterial Doppler ultrasound which is negative for occlusion. Transferred here for lysis of DVT.  Left lower extremity ultrasound noted with extensive DVT of left lower extremity, Acute to subacute, deep venous thrombosis in the common femoral, femoral,  and popliteal veins with areas of complete occlusion and areas of partial occlusion, The proximal extent of the thrombus appears loosely attached..  Right lower extremity also noted with acute subacute to DVT.  This was discussed with IR Dr. Schrader ans  is planning for thrombolysis and IVC filter placement.  Urology was consulted for severe right-sided hydronephrosis and recommending continue Hightower and repeat ultrasound in 48 hours. S/p IVC filter placement by Dr. Schrader on 2/8.    Repeat CT abdomen pelvis on 2/10 noted with moderate to severe bilateral hydronephrosis, possible obstructing stone in the right UVJ.  Urology planning for cystoscopy with possible stent           Interval Problem Update      2/12/2024      The patient Left against medical advice.  I discussed with the patient the risks of leaving without receiving appropriate care to include permanent disability or death.  I have discussed possible alternatives.  The patient is not intoxicated.  The patient is a capable decision maker and understands the risks and benefits of their decision.  While I certainly disagree with the patient's decision, I respect the patient's autonomy and will not keep them here against their will.  They understand that their decision to leave can be reversed at any time  and they can return to us at any time for any reason at all.           I spoke with the patient at bedside.  I discussed with him  regarding need of blood thinner and surgical intervention given bilateral hydronephrosis and MELIZA.  While I was talking with room he got agitated and left the room.           I have sent Eliquis to pharmacy at [facility] in [place].     I attempted to call patient's spouse and patient number provided in chart.  Is not reachable.    Multiple rounds made at the bedside to  patient to stay.     I have discussed this patient's plan of care and discharge plan at IDT rounds today with Case Management, Nursing, Nursing leadership, and other members of the IDT team.    Consultants/Specialty  urology and IR     Code Status  Full Code    Disposition  The patient is not medically cleared for discharge to home or a post-acute facility.  Anticipate discharge to: home with close outpatient follow-up    I have placed the appropriate orders for post-discharge needs.    Review of Systems  Review of Systems   Respiratory:  Negative for cough and shortness of breath.    Musculoskeletal:  Positive for joint pain and myalgias.        Physical Exam  Temp:  [36.4 °C (97.6 °F)-37.2 °C (99 °F)] 36.6 °C (97.8 °F)  Pulse:  [78-82] 78  Resp:  [16-18] 17  BP: ()/(63-81) 112/81  SpO2:  [96 %-99 %] 96 %    Physical Exam  Constitutional:       Appearance: He is ill-appearing.   Cardiovascular:      Rate and Rhythm: Normal rate and regular rhythm.      Pulses: Normal pulses.      Heart sounds: Normal heart sounds.   Pulmonary:      Effort: No respiratory distress.      Breath sounds: Normal breath sounds.   Genitourinary:     Comments: On Hightower  Musculoskeletal:      Right lower leg: No edema.      Left lower leg: Edema present.      Comments: Bilateral lower extremity edema.  Diffuse erythema.  Warm and tender to touch   Neurological:      General: No focal deficit present.      Mental Status: He is alert  and oriented to person, place, and time.   Psychiatric:         Mood and Affect: Mood normal.         Fluids    Intake/Output Summary (Last 24 hours) at 2/12/2024 1211  Last data filed at 2/11/2024 2344  Gross per 24 hour   Intake 940 ml   Output 2800 ml   Net -1860 ml         Laboratory  Recent Labs     02/10/24  1519   WBC 9.4   RBC 3.71*   HEMOGLOBIN 11.3*   HEMATOCRIT 34.5*   MCV 93.0   MCH 30.5   MCHC 32.8   RDW 44.5   PLATELETCT 375   MPV 9.2       Recent Labs     02/10/24  1519 02/11/24  0937 02/11/24  1634   SODIUM 133* 133*  --    POTASSIUM 5.0 5.7* 5.1   CHLORIDE 101 103  --    CO2 21 21  --    GLUCOSE 125* 119*  --    BUN 28* 32*  --    CREATININE 2.24* 2.07*  --    CALCIUM 8.5 8.0*  --                        Imaging  CT-ABDOMEN-PELVIS W/O   Final Result      1.  Moderate to severe bilateral hydronephrosis, similar to prior. Possible obstructing stone at the right UVJ. No calcified left renal obstruction seen.   2.  Multiple bladder stones again noted.   3.  Bladder is thick-walled and decompressed with a Hightower catheter. Correlate with urinalysis for acute infection.   4.  Subcutaneous edema of the visualized left lower extremity. Correlate for infection.   5.  Left greater than right inguinal lymphadenopathy.   6.  Small volume ascites.   7.  Distended gallbladder again noted.   8.  Small hiatal hernia.      US-EXTREMITY ARTERY LOWER BILAT   Final Result      US-RENAL   Final Result         1.  Severe right hydronephrosis appears somewhat decreased since prior study.      IR-INSERT IVC FILTER WITH IG & SI   Final Result      1. Ultrasound and fluoroscopic guided placement of an Option Elite infrarenal inferior vena cava filter.      2. Inferior vena cavagram within normal limits with no evidence of caval thrombus or occlusion.      US-EXTREMITY VENOUS LOWER UNILAT RIGHT   Final Result      CT-RENAL COLIC EVALUATION(A/P W/O)   Final Result      1.  Moderate to severe bilateral hydronephrosis.  No  calcified ureteral stone.   2.  Bladder decompressed by Hightower catheter with multiple bladder stones present.   3.  Distended gallbladder.   4.  Diffusely increased colonic stool suggesting constipation.   5.  Limited by lack of IV contrast.   6.  Diffuse LEFT thigh subcutaneous edema and LEFT groin adenopathy.      US-RENAL   Final Result      1.  Severe right-sided hydronephrosis.      2.  Multiple left renal cysts with apparent moderate hydronephrosis.      3.  Distended urinary bladder.      US-EXTREMITY VENOUS LOWER UNILAT LEFT   Final Result           Assessment/Plan  * Acute deep vein thrombosis (DVT) of left lower extremity, unspecified vein (HCC)- (present on admission)  Assessment & Plan  Left lower extremity duplex noted with acute/subacute DVT in common femoral, femoral and popliteal vein with area of complete occlusion and areas of partial occlusion.  DVT in  peroneal and posterior tibial vein.   IVC filter placement by Dr. Schrader on 2/8  Therapeutic Lovenox, switch to oral anticoagulant at discharge    Hypomagnesemia  Assessment & Plan  Improved  Repeat labs in a.m.    Hydronephrosis  Assessment & Plan  Renal ultrasound result reviewed, severe right hydronephrosis appears somewhat decreased on 2/9/2024  Continue Hightower  Urology following  Repeat CT abdomen pelvis on 2/10 noted with moderate to severe bilateral hydronephrosis, possible obstructing stone in the right UVJ.  Scheduled for cystoscopy with stent placement    DVT, lower extremity, proximal, acute, bilateral (HCC)  Assessment & Plan  Status post IVC filter  Currently on therapeutic Lovenox    Acute renal failure (ARF) ([facility])  Assessment & Plan    Acute kidney injury, likely secondary to obstructive uropathy due to right-sided hydronephrosis  Intravenous fluids  Avoid nephrotoxins, monitor inputs and outputs  Hightower placed  Urology following  Repeat kidney function closely  Repeat CT abdomen pelvis on 2/10 noted with moderate to severe bilateral  hydronephrosis, possible obstructing stone in the right UVJ.  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 In review of yesterday's note (2/11/2024), there are no changes except as documented above.         Greater than 53 minutes spent preparing to see patient (e.g. review of tests) obtaining and/or reviewing separately obtained history. Performing a medically appropriate examination and/ evaluation.  Counseling and educating the patient/family/caregiver.  Ordering medications, tests, or procedures.  Referring and communicating with other health care professionals.  Documenting clinical information in EPIC.  Independently interpreting results and communicating results to patient/family/caregiver.  Care coordination.         Patient left AGAINST MEDICAL ADVICE on 2/12/2024

## 2024-02-13 NOTE — DISCHARGE SUMMARY
Discharge Summary    CHIEF COMPLAINT ON ADMISSION  Chief Complaint   Patient presents with    Leg Swelling     Transfer for possible DVT.       Reason for Admission  EMS     Admission Date  2/6/2024    CODE STATUS  Prior    HPI & HOSPITAL COURSE    58 y.o. male with past medical history of DVT which was provoked from surgery, CABG, cardiac surgery, carotid thrombectomy on Plavix who presented 2/6/2024 transfer from [facility] for worsening left lower extremity DVT.  Patient had arterial Doppler ultrasound which is negative for occlusion. Transferred here for lysis of DVT.  Left lower extremity ultrasound noted with extensive DVT of left lower extremity, Acute to subacute, deep venous thrombosis in the common femoral, femoral,  and popliteal veins with areas of complete occlusion and areas of partial occlusion, The proximal extent of the thrombus appears loosely attached..  Right lower extremity also noted with acute subacute to DVT.  This was discussed with IR Dr. Schrader ans  is planning for thrombolysis and IVC filter placement.  Urology was consulted for severe right-sided hydronephrosis and recommending continue Hightower and repeat ultrasound in 48 hours. S/p IVC filter placement by Dr. Schrader on 2/8.     Repeat CT abdomen pelvis on 2/10 noted with moderate to severe bilateral hydronephrosis, possible obstructing stone in the right UVJ.  Urology recommended cystoscopy with possible stent             The patient Left against medical advice.  I discussed with the patient the risks of leaving without receiving appropriate care to include permanent disability or death.  I have discussed possible alternatives.  The patient is not intoxicated.  The patient is a capable decision maker and understands the risks and benefits of their decision.  While I certainly disagree with the patient's decision, I respect the patient's autonomy and will not keep them here against their will.  They understand that their decision to  leave can be reversed at any time and they can return to us at any time for any reason at all.           I spoke with the patient at bedside.  I discussed with him  regarding need of blood thinner and surgical intervention given bilateral hydronephrosis and MELIZA.  While I was talking with room he got agitated and left the room.           I have sent Eliquis to pharmacy at [facility] in [place].     I attempted to call patient's spouse and patient number provided in chart.  Is not reachable.    He left AGAINST MEDICAL ADVICE on 2/12/2024    Discharge Date  2/12/2024      DISCHARGE DIAGNOSES  Principal Problem:    Acute deep vein thrombosis (DVT) of left lower extremity, unspecified vein (HCC) (POA: Yes)  Active Problems:    Hepatitis C (POA: Yes)    Occlusion of right carotid artery (POA: Yes)    Occlusion of right brachial artery (HCC) (POA: Yes)    Cellulitis of lower extremity (POA: Unknown)    Acute renal failure (ARF) (HCC) (POA: Unknown)    DVT, lower extremity, proximal, acute, bilateral (HCC) (POA: Unknown)    Hydronephrosis (POA: Unknown)    Hypomagnesemia (POA: Unknown)  Resolved Problems:    * No resolved hospital problems. *      FOLLOW UP  No future appointments.  Ander Way M.D.  [address] [facility] [address] [phone]            MEDICATIONS ON DISCHARGE     Medication List        START taking these medications        Instructions   * apixaban 5mg Tabs  Commonly known as: Eliquis   Take 2 Tablets by mouth 2 times a day for 7 days.  Dose: 10 mg     * apixaban 5mg Tabs  Start taking on: February 19, 2024  Commonly known as: Eliquis   Take 1 Tablet by mouth 2 times a day for 30 days.  Dose: 5 mg     tamsulosin 0.4 MG capsule  Commonly known as: Flomax   Take 1 Capsule by mouth every day.  Dose: 0.4 mg           * This list has 2 medication(s) that are the same as other medications prescribed for you. Read the directions carefully, and ask your doctor or other care provider to  review them with you.                  Allergies  No Known Allergies    DIET  No orders of the defined types were placed in this encounter.        LABORATORY  Lab Results   Component Value Date    SODIUM 133 (L) 02/11/2024    POTASSIUM 5.1 02/11/2024    CHLORIDE 103 02/11/2024    CO2 21 02/11/2024    GLUCOSE 119 (H) 02/11/2024    BUN 32 (H) 02/11/2024    CREATININE 2.07 (H) 02/11/2024        Lab Results   Component Value Date    WBC 9.4 02/10/2024    HEMOGLOBIN 11.3 (L) 02/10/2024    HEMATOCRIT 34.5 (L) 02/10/2024    PLATELETCT 375 02/10/2024        Total time of the discharge process exceeds less than 30  minutes.

## 2024-05-22 ENCOUNTER — TELEPHONE (OUTPATIENT)
Dept: VASCULAR LAB | Facility: MEDICAL CENTER | Age: 59
End: 2024-05-22
Payer: COMMERCIAL

## 2024-05-22 NOTE — TELEPHONE ENCOUNTER
Chart reviewed in accordance with IVC filter protocol.  Implant date: 2/8/24   for: DVT and PE despite anticoagulation.     Called [phone] No VM set up  Wife's number not in service.  Called Cell [phone] VM not set up     Will try again    Maria Fernanda TREVIÑO  [facility] Surveillance Nurse

## 2024-09-12 NOTE — PROGRESS NOTES
Applied knee immobilizer to pt's RLE per D.O. orders. Delivered TLSO to pt and left at bedside for future application.    Reason For Consult  Acute kidney injury on top of CKD    History Of Present Illness  Gregorio North is a 65 y.o. male  with PMHx CKD stage IV (hx kidney cancer)-follows with Dr. Kaplan as an outpatient, T2DM (hx diabetic foot wounds with osteomyelitis, neuropathy), HTN, chronic iron deficiency anemia, BPH, anxiety, hypokalemia, HLD, CHF who presented to the hospital with concern for STEVE seen on outpatient labs (Cr 4.3, , K 5.8).  Nephrology was consulted to manage acute kidney injury 2 of CKD    Patient was seen and examined in his room today.  He is well-known to me-under my care for CKD management.  He is on aggressive GFR preservation strategy for progressive diabetic nephropathy and significant proteinuria.  He is currently on SGL 2 inhibitors/finerenone/RAAS inhibitors.  Last time seen in office was in March 2024-volume status is accepted.  Patient suffers from recurrent acute kidney injury and hypervolemia.  Recent blood work showed significant elevated BUN, hyperkalemia.  I called the patient over the phone who complained about diarrhea/nausea vomiting and possible uremia.  Patient was advised to present to the ED for evaluation  Today, he looks well.  Sitting on the edge of his bed eating his meal.  Daughter is at bedside.  No nausea or vomiting today.  Diarrhea is slowing down-under C. difficile rule out.  No uremia or significant hypervolemia on exam.  He cannot tolerate potassium binder daily so he takes it every other day/Lokelma.    He also reports 3 month history of diarrhea, vomiting, dyspepsia, and decreased PO intake. He associates these symptoms with increasing his dose of Ozempic to 2g following a drug holiday due to colonoscopy.          Chief Complaint   Patient presents with    abnormal labs         ED course:      Vitals: Temp 97.9, /70, HR 80, RR 20, Spo2 100% on RA     Labs:   -CBC: WBC 7.5, Hgb 10.2, Hct 30.8, MCV 97, Platelet 216, PT 10.7, INR 1.0  -CMP: Glucose 138, Na  137, K 5.4, Cl 107, HCO3 19, , Cr 4.23 (baseline 2-3), Ca 8.5, Alk Phos 90, ALT 48, AST 24  -Lipase: 38  -Lactate: 1.6  -UA ordered     Imaging:   - EKG: Accelerated Junctional rhythm. Low voltage QRS.      Micro:   - Urine culture: pending  - COVID: negative  - C. 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    Surgical History  He has a past surgical history that includes Foot surgery (Left, 03/31/2016); Other surgical history (02/04/2015); MR angio head wo IV contrast (09/17/2014); and CT guided percutaneous biopsy bone deep (08/29/2022).     Social History  He reports that he has never smoked. He has never used smokeless tobacco. He reports that he does not currently use alcohol. He reports that he does not use drugs.    Family History  Family History   Problem Relation Name Age of Onset    No Known Problems Mother      Heart attack Father      Diabetes Father          Allergies  Patient has no known allergies.    Review of Systems     Constitutional: Intentional weight loss  Eyes: no blurred vision and no diplopia.   ENT: no hearing loss, no earache, no sore throat, no swollen glands in the neck and no nasal discharge.   Cardiovascular: Chronic  "leg swelling-improved  Respiratory: no shortness of breath, no chronic cough and no shortness of breath during exertion.   Gastrointestinal frequent diarrhea, bloating  Genitourinary: no dysuria and no hematuria.   Musculoskeletal: no arthralgias and no myalgias.   Skin: no rashes and no skin lesions.   Neurological: no headaches and no dizziness.   Psychiatric: no confusion, no depression and no anxiety.   Endocrine: no heat intolerance, no cold intolerance, appetite not increased, no thyroid disorder, no increased urinary frequency and no dry skin.   Hematologic/Lymphatic: does not bleed easily and does not bruise easily.   All other systems have been reviewed and are negative for complaint.        Physical Exam      General appearance: no distress awake and alert on room air, obese, no significant hypervolemia exam, room air  Eyes: non-icteric  HEENT: atrumatic head, PEERLA, moist mucosa  Skin: no apparent rash  Heart: NSR, S1, S2 normal, no murmur or gallop  Lungs: Symmetrical expansion,CTA bilat no wheezing/crackles  Abdomen: soft, nt/nd, obese  Extremities: no significant edema bilat  Neuro: No FND,asterixis, no focal deficits noticed           I&O 24HR    Intake/Output Summary (Last 24 hours) at 9/12/2024 1052  Last data filed at 9/12/2024 1000  Gross per 24 hour   Intake 2802.08 ml   Output 2100 ml   Net 702.08 ml       Vitals 24HR  Heart Rate:  [75-85]   Temp:  [36.1 °C (97 °F)-36.6 °C (97.9 °F)]   Resp:  [15-21]   BP: (110-156)/(62-89)   Height:  [175.3 cm (5' 9\")]   Weight:  [110 kg (242 lb)]   SpO2:  [96 %-100 %]         Relevant Results  RFP  Recent Labs     09/12/24  0654 09/11/24  1319 09/11/24  0922 06/27/24  1136 03/13/24  1035 02/03/24  0903 02/02/24  0916 02/01/24 2023    137 138 138 141 139 144 137   K 5.9* 5.4* 5.8* 4.4 4.7 4.7 5.1 5.5*   * 107 109* 104 105 110* 116* 111*   CO2 19* 19* 18* 24 25 21 18* 15*   BUN 93* 106* 115* 69* 52* 72* 95* 108*   CREATININE 3.36* 4.23* 4.30* 3.32* " 3.04* 2.63* 3.05* 3.48*   GLUCOSE 123* 138* 197* 98 170* 156* 153* 204*   CALCIUM 8.3* 8.5* 8.8 9.6 9.1 8.6 8.4* 8.2*   ALBUMIN 3.7 3.9 3.9  --  3.8 3.8 3.8 3.6   PHOS 4.7 6.5* 6.8*  --  3.6 4.7 6.3* 6.9*   EGFR 20* 15* 15* 20* 22* 26* 22* 19*   ANIONGAP 14 16 17 14 16 13 15 17        Urineanalysis  Recent Labs     09/11/24  1711 02/01/24  1317 04/21/23  1146 02/09/23  1315 10/17/22  1205 02/03/22  1231   COLORU Colorless* Straw YELLOW YELLOW STRAW YELLOW   APPEARANCEU Clear Clear CLEAR CLEAR CLEAR CLEAR   SPECGRAVU 1.011 1.008 1.010 1.014 1.011 1.006   YEISON 6.0 5.0 6.0 6.0 6.0 7.0   PROTUR NEGATIVE 30 (1+)* 306*  >=500(3+)* 683*  >=500(3+)* >=500(3+)* 100(2+)*   GLUCOSEU 300 (3+)* >=500 (3+)* 150(2+)* 150(2+)* 150(2+)* NEGATIVE   BLOODU NEGATIVE SMALL (1+)* NEGATIVE NEGATIVE SMALL(1+)* NEGATIVE   KETONESU NEGATIVE NEGATIVE NEGATIVE NEGATIVE NEGATIVE NEGATIVE   BILIRUBINU NEGATIVE NEGATIVE NEGATIVE NEGATIVE NEGATIVE NEGATIVE   NITRITEU NEGATIVE NEGATIVE NEGATIVE NEGATIVE NEGATIVE NEGATIVE   LEUKOCYTESU NEGATIVE NEGATIVE NEGATIVE NEGATIVE NEGATIVE NEGATIVE       Urine Electrolytes  Recent Labs     09/11/24  1711 09/11/24  0922 03/13/24  1115 02/01/24  1317 12/05/23  0906 09/13/23  0959 07/28/23  1010 04/21/23  1146 02/09/23  1315 10/17/22  1205 02/05/22 2004 02/03/22  1231 06/09/21  1154 02/19/20  1237 05/28/19  0909 05/04/18  0928   SODIUMURR  --  76  --  61  --   --   --   --  56  --  30  --   --   --   --   --    NACREATUR  --  123  --  136  --   --   --   --  66  --  20  --   --   --   --   --    KUR  --  26  --  23  --   --   --   --  49  --   --   --   --   --   --   --    KCREATUR  --  42  --  51  --   --   --   --  58  --   --   --   --   --   --   --    CREATU  --  61.8  61.8 90.4 44.9 47.0 44.9 60.0 74.9  74.9 84.5  84.5  84.5 45.5 152.0  --  114.0 182.0 107.0 66.6   PROTUR NEGATIVE  --   --  30 (1+)*  --   --   --  306*  >=500(3+)* 683*  >=500(3+)* >=500(3+)*  --  100(2+)*  --   --   --   --     UTPCR  --   --   --   --   --   --   --  4.09* 8.08*  --   --   --   --   --   --   --    ALBUMINUR  --  64.7 422.0  --  329.2  --   --   --   --   --   --   --   --   --   --   --    MICROALBCREA  --  104.7* 466.8*  --  700.4* 2,284.6* 548.2* 2,782.0* SEE COMMENT SEE COMMENT  --   --  1,057.8* 252.5* 37.0* <7.5        Urine Micro  Recent Labs     02/01/24  1317 04/21/23  1146 02/09/23  1315 10/17/22  1205 02/03/22  1231   WBCU NONE NONE 1 2* 1   RBCU 1-2 NONE 3 3* 1   HYALCASTU  --   --  2+*  --   --    SQUAMEPIU  --  1 1 1  --    BACTERIAU 1+*  --   --   --   --    MUCUSU  --  FEW FEW  --  FEW        Iron  Recent Labs     09/11/24  0922 10/11/23  1620 09/13/23  0959 07/28/23  1010 09/08/22  0921 02/04/22  0645 06/09/21  1115 02/01/21  0904 05/19/20  1022   IRON 111 82 71 67 64 23* 77 95 127   TIBC 302 288 275 293 318 185* 335 354 365   IRONSAT 37 28 26 23* 20* 12* 23* 27 35   FERRITIN 464* 241 341* 286 224 552* 264 209 201       @Mg@    Lab Results   Component Value Date    WBC 6.9 09/12/2024    HGB 9.4 (L) 09/12/2024    HCT 28.7 (L) 09/12/2024    MCV 96 09/12/2024     09/12/2024       Lab Results   Component Value Date    CKTOTAL 108 02/09/2023       Albumin/Creatinine Ratio   Date Value Ref Range Status   09/11/2024 104.7 (H) <30.0 ug/mg Creat Final   03/13/2024 466.8 (H) <30.0 ug/mg Creat Final   12/05/2023 700.4 (H) <30.0 ug/mg Creat Final     [Held by provider] allopurinol, 100 mg, oral, Daily  atorvastatin, 20 mg, oral, Nightly  carvedilol, 6.25 mg, oral, BID  clopidogrel, 75 mg, oral, Daily  famotidine, 40 mg, oral, Daily  [Held by provider] furosemide, 80 mg, oral, q AM  heparin (porcine), 5,000 Units, subcutaneous, q8h  insulin glargine, 26 Units, subcutaneous, q24h  insulin lispro, 0-15 Units, subcutaneous, TID  insulin lispro, 8 Units, subcutaneous, TID AC  [Held by provider] lisinopril, 40 mg, oral, q AM  sodium zirconium cyclosilicate, 5 g, oral, Daily           Assessment/Plan       Gregorio  ABIODUN North is a 65 y.o. male  with PMHx CKD stage IV (hx kidney cancer)-follows with Dr. Kaplan as an outpatient, T2DM (hx diabetic foot wounds with osteomyelitis, neuropathy), HTN, chronic iron deficiency anemia, BPH, anxiety, hypokalemia, HLD, CHF who presented to the hospital with concern for STEVE seen on outpatient labs (Cr 4.3, , K 5.8).  Nephrology was consulted to manage acute kidney injury 2 of CKD    Assessment & Plan  Renal insufficiency    1-chronic kidney disease-stage IV/A3.  Baseline serum creatinine 3-3.5, GFR 20 mL/min per appointment 3 m² progressive diabetic nephropathy with significant proteinuria.  Currently on SGL inhibitors/RAAS inhibitor/Kerendia as an outpatient    2-nonoliguric acute kidney injury on top of CKD with serum creatinine 4-4.5 and GFR down to 15.  Significantly bated BUN above 100 with no uremia.  Most likely prerenal in the setting of overdiuresis.  Currently takes Lasix 80 mg in the morning for recurrent hypervolemia and chronic leg swelling    3-hyperkalemia-intolerant to Lokelma/potassium binder daily he takes it every other day    4-metabolic acidosis secondary to above    5-CHF with hypervolemia issues    Course: Improved kidney function parameters and back to baseline after fluid resuscitation.  Accepted volume control    Recommendation plan  -Instructed to take potassium binder/Lokelma daily as opposed to every other day  -Okay to discharge patient from kidney standpoint  -Will drop Lasix to 40 mg daily alternating with 80 mg daily with discharge  -Continue rest of medication Jardiance, Kerendia, RAAS inhibitors, Ozempic as an outpatient-follow sick day rules.  Patient is aware to hold this medication in case of feeling poorly to avoid frequent dehydration    Will need repeat renal function panel in a week after discharge to monitor potassium and other kidney function parameters.  Will follow-up as an outpatient as planned with me in October 2024    I spent  minutes  in the professional and overall care of this patient.      Imani Kaplan MD

## (undated) DEVICE — SUTURE 3-0 ETHILON PS-1 (36PK/BX)

## (undated) DEVICE — GLOVE BIOGEL PI INDICATOR SZ 6.5 SURGICAL PF LF - (50/BX 4BX/CA)

## (undated) DEVICE — TOWEL STOP TIMEOUT SAFETY FLAG (40EA/CA)

## (undated) DEVICE — PACK MAJOR ORTHO - (2EA/CA)

## (undated) DEVICE — TOURNIQUET, STERILE 18 (RED)

## (undated) DEVICE — SET LEADWIRE 5 LEAD BEDSIDE DISPOSABLE ECG (1SET OF 5/EA)

## (undated) DEVICE — TUBE CONNECT SUCTION CLEAR 120 X 1/4" (50EA/CA)"

## (undated) DEVICE — PADDING CAST 6 IN STERILE - 6 X 4 YDS (24/CA)

## (undated) DEVICE — DRESSING TRANSPARENT FILM TEGADERM 4 X 4.75" (50EA/BX)"

## (undated) DEVICE — GLOVE SZ 7.5 BIOGEL PI MICRO - PF LF (50PR/BX)

## (undated) DEVICE — STOCKINET TUBULAR 6IN STERILE - 6 X 48YDS (25/CA)

## (undated) DEVICE — SUCTION INSTRUMENT YANKAUER OPEN TIP W/O VENT (50EA/CA)

## (undated) DEVICE — PAD LAP STERILE 18 X 18 - (5/PK 40PK/CA)

## (undated) DEVICE — SUCTION INSTRUMENT YANKAUER BULBOUS TIP W/O VENT (50EA/CA)

## (undated) DEVICE — WATER IRRIGATION STERILE 1000ML (12EA/CA)

## (undated) DEVICE — SYRINGE NON SAFETY 10 CC 20 GA X 1-1/2 IN (100/BX 4BX/CA)

## (undated) DEVICE — DRILL BIT 1.8MMX80MM CALIBRATED (4TX2=8)

## (undated) DEVICE — PAD PREP 24 X 48 CUFFED - (100/CA)

## (undated) DEVICE — DRAPE SURG STERI-DRAPE 7X11OD - (40EA/CA)

## (undated) DEVICE — SUTURE 0 VICRYL PLUS CT-1 - 8 X 18 INCH (12/BX)

## (undated) DEVICE — DRAPE C ARMOR (12EA/CA)

## (undated) DEVICE — GLOVE BIOGEL PI ORTHO SZ 8.5 PF LF (40/BX)

## (undated) DEVICE — WRAP COBAN SELF-ADHERENT 6 IN X  5YDS STERILE TAN (12/CA)

## (undated) DEVICE — DRESSING PETROLEUM GAUZE 5 X 9" (50EA/BX 4BX/CA)"

## (undated) DEVICE — SENSOR SPO2 NEO LNCS ADHESIVE (20/BX) SEE USER NOTES

## (undated) DEVICE — BANDAGE ELASTIC STERILE MATRIX 6 X 10 (20EA/CA)

## (undated) DEVICE — SUTURE ETHILON 2-0 FSLX 30 (36PK/BX)"

## (undated) DEVICE — SYRINGE 30 ML LL (56/BX)

## (undated) DEVICE — SUTURE GENERAL

## (undated) DEVICE — PACK UPPER EXTREMITY (2EA/CA)

## (undated) DEVICE — ELECTRODE 850 FOAM ADHESIVE - HYDROGEL RADIOTRNSPRNT (50/PK)

## (undated) DEVICE — GLOVE SZ 8 BIOGEL PI MICRO - PF LF (50PR/BX)

## (undated) DEVICE — KIT EVACUATER 3 SPRING PVC LF 1/8 DRAIN SIZE (10EA/CA)"

## (undated) DEVICE — BLADE SURGICAL CLIPPER - (50EA/CA)

## (undated) DEVICE — GOWN SURGEONS X-LARGE - DISP. (30/CA)

## (undated) DEVICE — DRILL BIT 2.8X200 PERC CALIB - (6TX2=12)

## (undated) DEVICE — TRAY SKIN SCRUB PVP WET (20EA/CA) PART #DYND70356 DISCONTINUED

## (undated) DEVICE — COVER MAYO STAND X-LG - (22EA/CA)

## (undated) DEVICE — PADDING CAST 4 IN STERILE - 4 X 4 YDS (24/CA)

## (undated) DEVICE — GOWN WARMING STANDARD FLEX - (30/CA)

## (undated) DEVICE — DRAPE IOBAN II INCISE 23X17 - (10EA/BX 4BX/CA)

## (undated) DEVICE — LACTATED RINGERS INJ 1000 ML - (14EA/CA 60CA/PF)

## (undated) DEVICE — CANISTER SUCTION 3000ML MECHANICAL FILTER AUTO SHUTOFF MEDI-VAC NONSTERILE LF DISP  (40EA/CA)

## (undated) DEVICE — SUTURE 1 VICRYL PLUS CTX - 8 X 18 INCH (12/BX)

## (undated) DEVICE — SUTURE 2-0 VICRYL PLUS CT-1 - 8 X 18 INCH(12/BX)

## (undated) DEVICE — ELECTRODE DUAL RETURN W/ CORD - (50/PK)

## (undated) DEVICE — DRAPE U SPLIT IMP 54 X 76 - (24/CA)

## (undated) DEVICE — KIT ANESTHESIA W/CIRCUIT & 3/LT BAG W/FILTER (20EA/CA)

## (undated) DEVICE — PROTECTOR ULNA NERVE - (36PR/CA)

## (undated) DEVICE — GLOVE BIOGEL PI INDICATOR SZ 8.0 SURGICAL PF LF -(50/BX 4BX/CA)

## (undated) DEVICE — BANDAGE ELASTIC 4 HONEYCOMB - 4"X5YD LF (20/CA)"

## (undated) DEVICE — STAPLER SKIN DISP - (6/BX 10BX/CA) VISISTAT

## (undated) DEVICE — PACK LOWER EXTREMITY - (2/CA)

## (undated) DEVICE — PENCIL ELECTSURG 10FT BTN SWH - (50/CA)

## (undated) DEVICE — MASK ANESTHESIA ADULT  - (100/CA)

## (undated) DEVICE — GLOVE BIOGEL SZ 7 SURGICAL PF LTX - (50PR/BX 4BX/CA)

## (undated) DEVICE — CHLORAPREP 26 ML APPLICATOR - ORANGE TINT(25/CA)

## (undated) DEVICE — DRAPE LARGE 3 QUARTER - (20/CA)

## (undated) DEVICE — GLOVE SZ 6.5 BIOGEL PI MICRO - PF LF (50PR/BX)

## (undated) DEVICE — BOVIE BLADE COATED - (50/PK)

## (undated) DEVICE — SODIUM CHL IRRIGATION 0.9% 1000ML (12EA/CA)

## (undated) DEVICE — DRAPE 36X28IN RAD CARM BND BG - (25/CA) O

## (undated) DEVICE — DRAPE LOWER EXTREMETY - (6/CA)

## (undated) DEVICE — TOURNIQUET CUFF 24 X 4 ONE PORT - STERILE (10/BX)

## (undated) DEVICE — GLOVE BIOGEL ECLIPSE PF LATEX SIZE 8.5 (50PR/BX)

## (undated) DEVICE — HEAD HOLDER JUNIOR/ADULT

## (undated) DEVICE — DRILL BIT 2.8MM X 155MM CALIBRATED (8TX2=16)

## (undated) DEVICE — DRAPE U ORTHOPEDIC - (10/BX)

## (undated) DEVICE — NEPTUNE 4 PORT MANIFOLD - (20/PK)

## (undated) DEVICE — TUBING CLEARLINK DUO-VENT - C-FLO (48EA/CA)

## (undated) DEVICE — TOWELS CLOTH SURGICAL - (4/PK 20PK/CA)

## (undated) DEVICE — GLOVE BIOGEL PI INDICATOR SZ 7.5 SURGICAL PF LF -(50/BX 4BX/CA)

## (undated) DEVICE — SLEEVE, VASO, THIGH, MED

## (undated) DEVICE — SET EXTENSION WITH 2 PORTS (48EA/CA) ***PART #2C8610 IS A SUBSTITUTE*****